# Patient Record
Sex: FEMALE | Race: WHITE | Employment: UNEMPLOYED | URBAN - METROPOLITAN AREA
[De-identification: names, ages, dates, MRNs, and addresses within clinical notes are randomized per-mention and may not be internally consistent; named-entity substitution may affect disease eponyms.]

---

## 2022-08-29 ENCOUNTER — HOSPITAL ENCOUNTER (INPATIENT)
Age: 62
LOS: 16 days | Discharge: NURSING, REHAB OR LONG-TERM CARE | DRG: 280 | End: 2022-09-15
Attending: EMERGENCY MEDICINE | Admitting: INTERNAL MEDICINE
Payer: MEDICAID

## 2022-08-29 DIAGNOSIS — K76.6 PORTAL HYPERTENSIVE GASTROPATHY (HCC): ICD-10-CM

## 2022-08-29 DIAGNOSIS — K70.11 ASCITES DUE TO ALCOHOLIC HEPATITIS: ICD-10-CM

## 2022-08-29 DIAGNOSIS — E43 SEVERE PROTEIN-CALORIE MALNUTRITION (HCC): ICD-10-CM

## 2022-08-29 DIAGNOSIS — R17 JAUNDICE: ICD-10-CM

## 2022-08-29 DIAGNOSIS — N17.9 AKI (ACUTE KIDNEY INJURY) (HCC): ICD-10-CM

## 2022-08-29 DIAGNOSIS — E72.20 HYPERAMMONEMIA (HCC): ICD-10-CM

## 2022-08-29 DIAGNOSIS — K31.89 PORTAL HYPERTENSIVE GASTROPATHY (HCC): ICD-10-CM

## 2022-08-29 DIAGNOSIS — D68.9 COAGULOPATHY (HCC): ICD-10-CM

## 2022-08-29 DIAGNOSIS — D72.829 LEUKOCYTOSIS, UNSPECIFIED TYPE: ICD-10-CM

## 2022-08-29 DIAGNOSIS — K70.31 ALCOHOLIC CIRRHOSIS OF LIVER WITH ASCITES (HCC): Primary | ICD-10-CM

## 2022-08-29 DIAGNOSIS — K70.31 ASCITES DUE TO ALCOHOLIC CIRRHOSIS (HCC): ICD-10-CM

## 2022-08-29 DIAGNOSIS — K70.11 ALCOHOLIC HEPATITIS WITH ASCITES: ICD-10-CM

## 2022-08-29 DIAGNOSIS — M62.59 ATROPHY OF MUSCLE OF MULTIPLE SITES: ICD-10-CM

## 2022-08-29 DIAGNOSIS — D64.9 ANEMIA, UNSPECIFIED TYPE: ICD-10-CM

## 2022-08-29 PROCEDURE — 99285 EMERGENCY DEPT VISIT HI MDM: CPT

## 2022-08-30 ENCOUNTER — APPOINTMENT (OUTPATIENT)
Dept: GENERAL RADIOLOGY | Age: 62
DRG: 280 | End: 2022-08-30
Attending: INTERNAL MEDICINE
Payer: MEDICAID

## 2022-08-30 ENCOUNTER — APPOINTMENT (OUTPATIENT)
Dept: ULTRASOUND IMAGING | Age: 62
DRG: 280 | End: 2022-08-30
Attending: NURSE PRACTITIONER
Payer: MEDICAID

## 2022-08-30 ENCOUNTER — APPOINTMENT (OUTPATIENT)
Dept: CT IMAGING | Age: 62
DRG: 280 | End: 2022-08-30
Attending: INTERNAL MEDICINE
Payer: MEDICAID

## 2022-08-30 PROBLEM — K70.31 ALCOHOLIC CIRRHOSIS OF LIVER WITH ASCITES (HCC): Status: ACTIVE | Noted: 2022-08-30

## 2022-08-30 LAB
ALBUMIN SERPL-MCNC: 1.8 G/DL (ref 3.5–5)
ALBUMIN/GLOB SERPL: 0.6 {RATIO} (ref 1.1–2.2)
ALP SERPL-CCNC: 230 U/L (ref 45–117)
ALT SERPL-CCNC: 35 U/L (ref 12–78)
AMMONIA PLAS-SCNC: 11 UMOL/L
ANION GAP SERPL CALC-SCNC: 11 MMOL/L (ref 5–15)
ANION GAP SERPL CALC-SCNC: 14 MMOL/L (ref 5–15)
APPEARANCE UR: ABNORMAL
AST SERPL-CCNC: 91 U/L (ref 15–37)
BACTERIA URNS QL MICRO: NEGATIVE /HPF
BASE DEFICIT BLDV-SCNC: 6.2 MMOL/L
BASOPHILS # BLD: 0 K/UL (ref 0–0.1)
BASOPHILS NFR BLD: 0 % (ref 0–1)
BILIRUB SERPL-MCNC: 29.7 MG/DL (ref 0.2–1)
BILIRUB UR QL CFM: POSITIVE
BNP SERPL-MCNC: 1370 PG/ML
BUN SERPL-MCNC: 66 MG/DL (ref 6–20)
BUN SERPL-MCNC: 66 MG/DL (ref 6–20)
BUN/CREAT SERPL: 15 (ref 12–20)
BUN/CREAT SERPL: 16 (ref 12–20)
CALCIUM SERPL-MCNC: 7.9 MG/DL (ref 8.5–10.1)
CALCIUM SERPL-MCNC: 8.3 MG/DL (ref 8.5–10.1)
CHLORIDE SERPL-SCNC: 102 MMOL/L (ref 97–108)
CHLORIDE SERPL-SCNC: 96 MMOL/L (ref 97–108)
CO2 SERPL-SCNC: 19 MMOL/L (ref 21–32)
CO2 SERPL-SCNC: 19 MMOL/L (ref 21–32)
COLOR UR: ABNORMAL
COMMENT, HOLDF: NORMAL
COMMENT, HOLDF: NORMAL
CREAT SERPL-MCNC: 4.21 MG/DL (ref 0.55–1.02)
CREAT SERPL-MCNC: 4.35 MG/DL (ref 0.55–1.02)
CREAT UR-MCNC: 201 MG/DL
CREAT UR-MCNC: 204 MG/DL
CRP SERPL-MCNC: 10.2 MG/DL (ref 0–0.6)
DIFFERENTIAL METHOD BLD: ABNORMAL
EOSINOPHIL # BLD: 0 K/UL (ref 0–0.4)
EOSINOPHIL NFR BLD: 0 % (ref 0–7)
EPITH CASTS URNS QL MICRO: ABNORMAL /LPF
ERYTHROCYTE [DISTWIDTH] IN BLOOD BY AUTOMATED COUNT: 18.6 % (ref 11.5–14.5)
EST. AVERAGE GLUCOSE BLD GHB EST-MCNC: ABNORMAL MG/DL
FERRITIN SERPL-MCNC: 1121 NG/ML (ref 26–388)
FOLATE SERPL-MCNC: 14.7 NG/ML (ref 5–21)
GLOBULIN SER CALC-MCNC: 3.2 G/DL (ref 2–4)
GLUCOSE SERPL-MCNC: 122 MG/DL (ref 65–100)
GLUCOSE SERPL-MCNC: 95 MG/DL (ref 65–100)
GLUCOSE UR STRIP.AUTO-MCNC: NEGATIVE MG/DL
HBA1C MFR BLD: <3.8 % (ref 4–5.6)
HCO3 BLDV-SCNC: 17.9 MMOL/L (ref 23–28)
HCT VFR BLD AUTO: 27.3 % (ref 35–47)
HGB BLD-MCNC: 9.8 G/DL (ref 11.5–16)
HGB UR QL STRIP: ABNORMAL
IMM GRANULOCYTES # BLD AUTO: 0.6 K/UL (ref 0–0.04)
IMM GRANULOCYTES NFR BLD AUTO: 2 % (ref 0–0.5)
INR PPP: 1.8 (ref 0.9–1.1)
IRON SATN MFR SERPL: 32 % (ref 20–50)
IRON SERPL-MCNC: 34 UG/DL (ref 35–150)
KETONES UR QL STRIP.AUTO: NEGATIVE MG/DL
LACTATE SERPL-SCNC: 1.6 MMOL/L (ref 0.4–2)
LEUKOCYTE ESTERASE UR QL STRIP.AUTO: ABNORMAL
LIPASE SERPL-CCNC: 90 U/L (ref 73–393)
LYMPHOCYTES # BLD: 1.8 K/UL (ref 0.8–3.5)
LYMPHOCYTES NFR BLD: 6 % (ref 12–49)
MAGNESIUM SERPL-MCNC: 2.2 MG/DL (ref 1.6–2.4)
MCH RBC QN AUTO: 35.5 PG (ref 26–34)
MCHC RBC AUTO-ENTMCNC: 35.9 G/DL (ref 30–36.5)
MCV RBC AUTO: 98.9 FL (ref 80–99)
MONOCYTES # BLD: 2.7 K/UL (ref 0–1)
MONOCYTES NFR BLD: 9 % (ref 5–13)
NEUTS SEG # BLD: 24.8 K/UL (ref 1.8–8)
NEUTS SEG NFR BLD: 83 % (ref 32–75)
NITRITE UR QL STRIP.AUTO: POSITIVE
NRBC # BLD: 0 K/UL (ref 0–0.01)
NRBC BLD-RTO: 0 PER 100 WBC
PCO2 BLDV: 29.5 MMHG (ref 41–51)
PH BLDV: 7.39 [PH] (ref 7.32–7.42)
PH UR STRIP: 5.5 [PH] (ref 5–8)
PHOSPHATE SERPL-MCNC: 4.5 MG/DL (ref 2.6–4.7)
PLATELET # BLD AUTO: 330 K/UL (ref 150–400)
PMV BLD AUTO: 10.6 FL (ref 8.9–12.9)
PO2 BLDV: 30 MMHG (ref 25–40)
POTASSIUM SERPL-SCNC: 3.5 MMOL/L (ref 3.5–5.1)
POTASSIUM SERPL-SCNC: 3.8 MMOL/L (ref 3.5–5.1)
PROT SERPL-MCNC: 5 G/DL (ref 6.4–8.2)
PROT UR STRIP-MCNC: 30 MG/DL
PROT UR-MCNC: 116 MG/DL (ref 0–11.9)
PROT/CREAT UR-RTO: 0.6
PROTHROMBIN TIME: 18.4 SEC (ref 9–11.1)
RBC # BLD AUTO: 2.76 M/UL (ref 3.8–5.2)
RBC #/AREA URNS HPF: ABNORMAL /HPF (ref 0–5)
RBC MORPH BLD: ABNORMAL
RBC MORPH BLD: ABNORMAL
SAMPLES BEING HELD,HOLD: NORMAL
SAMPLES BEING HELD,HOLD: NORMAL
SAO2 % BLDV: 57.8 % (ref 65–88)
SERVICE CMNT-IMP: ABNORMAL
SODIUM SERPL-SCNC: 129 MMOL/L (ref 136–145)
SODIUM SERPL-SCNC: 132 MMOL/L (ref 136–145)
SODIUM UR-SCNC: 24 MMOL/L
SP GR UR REFRACTOMETRY: 1.02 (ref 1–1.03)
SPECIMEN TYPE: ABNORMAL
TIBC SERPL-MCNC: 107 UG/DL (ref 250–450)
TSH SERPL DL<=0.05 MIU/L-ACNC: 0.77 UIU/ML (ref 0.36–3.74)
UR CULT HOLD, URHOLD: NORMAL
URATE SERPL-MCNC: 8.3 MG/DL (ref 2.6–6)
UROBILINOGEN UR QL STRIP.AUTO: 1 EU/DL (ref 0.2–1)
VIT B12 SERPL-MCNC: >2000 PG/ML (ref 193–986)
WBC # BLD AUTO: 29.9 K/UL (ref 3.6–11)
WBC URNS QL MICRO: ABNORMAL /HPF (ref 0–4)

## 2022-08-30 PROCEDURE — 74176 CT ABD & PELVIS W/O CONTRAST: CPT

## 2022-08-30 PROCEDURE — 85610 PROTHROMBIN TIME: CPT

## 2022-08-30 PROCEDURE — P9047 ALBUMIN (HUMAN), 25%, 50ML: HCPCS | Performed by: FAMILY MEDICINE

## 2022-08-30 PROCEDURE — 36415 COLL VENOUS BLD VENIPUNCTURE: CPT

## 2022-08-30 PROCEDURE — 74011000250 HC RX REV CODE- 250: Performed by: INTERNAL MEDICINE

## 2022-08-30 PROCEDURE — 82728 ASSAY OF FERRITIN: CPT

## 2022-08-30 PROCEDURE — 83690 ASSAY OF LIPASE: CPT

## 2022-08-30 PROCEDURE — 71250 CT THORAX DX C-: CPT

## 2022-08-30 PROCEDURE — 74011250637 HC RX REV CODE- 250/637: Performed by: NURSE PRACTITIONER

## 2022-08-30 PROCEDURE — 84550 ASSAY OF BLOOD/URIC ACID: CPT

## 2022-08-30 PROCEDURE — 84443 ASSAY THYROID STIM HORMONE: CPT

## 2022-08-30 PROCEDURE — 80053 COMPREHEN METABOLIC PANEL: CPT

## 2022-08-30 PROCEDURE — 85025 COMPLETE CBC W/AUTO DIFF WBC: CPT

## 2022-08-30 PROCEDURE — 74011250636 HC RX REV CODE- 250/636: Performed by: INTERNAL MEDICINE

## 2022-08-30 PROCEDURE — 83735 ASSAY OF MAGNESIUM: CPT

## 2022-08-30 PROCEDURE — 86140 C-REACTIVE PROTEIN: CPT

## 2022-08-30 PROCEDURE — 82140 ASSAY OF AMMONIA: CPT

## 2022-08-30 PROCEDURE — 65660000001 HC RM ICU INTERMED STEPDOWN

## 2022-08-30 PROCEDURE — 81001 URINALYSIS AUTO W/SCOPE: CPT

## 2022-08-30 PROCEDURE — 84156 ASSAY OF PROTEIN URINE: CPT

## 2022-08-30 PROCEDURE — 82607 VITAMIN B-12: CPT

## 2022-08-30 PROCEDURE — 51702 INSERT TEMP BLADDER CATH: CPT

## 2022-08-30 PROCEDURE — 83550 IRON BINDING TEST: CPT

## 2022-08-30 PROCEDURE — 99223 1ST HOSP IP/OBS HIGH 75: CPT | Performed by: INTERNAL MEDICINE

## 2022-08-30 PROCEDURE — 93005 ELECTROCARDIOGRAM TRACING: CPT

## 2022-08-30 PROCEDURE — 83880 ASSAY OF NATRIURETIC PEPTIDE: CPT

## 2022-08-30 PROCEDURE — 74011250637 HC RX REV CODE- 250/637: Performed by: INTERNAL MEDICINE

## 2022-08-30 PROCEDURE — 84100 ASSAY OF PHOSPHORUS: CPT

## 2022-08-30 PROCEDURE — 83605 ASSAY OF LACTIC ACID: CPT

## 2022-08-30 PROCEDURE — 84300 ASSAY OF URINE SODIUM: CPT

## 2022-08-30 PROCEDURE — 71045 X-RAY EXAM CHEST 1 VIEW: CPT

## 2022-08-30 PROCEDURE — 82570 ASSAY OF URINE CREATININE: CPT

## 2022-08-30 PROCEDURE — 82746 ASSAY OF FOLIC ACID SERUM: CPT

## 2022-08-30 PROCEDURE — 82803 BLOOD GASES ANY COMBINATION: CPT

## 2022-08-30 PROCEDURE — 74011250636 HC RX REV CODE- 250/636: Performed by: FAMILY MEDICINE

## 2022-08-30 PROCEDURE — C9113 INJ PANTOPRAZOLE SODIUM, VIA: HCPCS | Performed by: INTERNAL MEDICINE

## 2022-08-30 PROCEDURE — 94761 N-INVAS EAR/PLS OXIMETRY MLT: CPT

## 2022-08-30 PROCEDURE — 83036 HEMOGLOBIN GLYCOSYLATED A1C: CPT

## 2022-08-30 RX ORDER — LIDOCAINE HYDROCHLORIDE 10 MG/ML
80 INJECTION, SOLUTION EPIDURAL; INFILTRATION; INTRACAUDAL; PERINEURAL
Status: DISPENSED | OUTPATIENT
Start: 2022-08-30 | End: 2022-08-31

## 2022-08-30 RX ORDER — ONDANSETRON 2 MG/ML
4 INJECTION INTRAMUSCULAR; INTRAVENOUS
Status: DISCONTINUED | OUTPATIENT
Start: 2022-08-30 | End: 2022-09-15 | Stop reason: HOSPADM

## 2022-08-30 RX ORDER — SODIUM CHLORIDE 9 MG/ML
50 INJECTION, SOLUTION INTRAVENOUS CONTINUOUS
Status: DISCONTINUED | OUTPATIENT
Start: 2022-08-30 | End: 2022-09-01

## 2022-08-30 RX ORDER — GABAPENTIN 600 MG/1
600 TABLET ORAL 3 TIMES DAILY
COMMUNITY
End: 2022-09-15

## 2022-08-30 RX ORDER — MIDODRINE HYDROCHLORIDE 5 MG/1
5 TABLET ORAL
Status: DISCONTINUED | OUTPATIENT
Start: 2022-08-30 | End: 2022-08-30

## 2022-08-30 RX ORDER — SODIUM CHLORIDE 0.9 % (FLUSH) 0.9 %
5-40 SYRINGE (ML) INJECTION AS NEEDED
Status: DISCONTINUED | OUTPATIENT
Start: 2022-08-30 | End: 2022-09-15 | Stop reason: HOSPADM

## 2022-08-30 RX ORDER — CARVEDILOL 25 MG/1
TABLET ORAL
COMMUNITY
End: 2022-09-15

## 2022-08-30 RX ORDER — MIDODRINE HYDROCHLORIDE 5 MG/1
5 TABLET ORAL
Status: COMPLETED | OUTPATIENT
Start: 2022-08-30 | End: 2022-08-30

## 2022-08-30 RX ORDER — CYCLOBENZAPRINE HCL 10 MG
TABLET ORAL
COMMUNITY
End: 2022-09-15

## 2022-08-30 RX ORDER — PREDNISONE 50 MG/1
TABLET ORAL
COMMUNITY
End: 2022-09-15

## 2022-08-30 RX ORDER — MIDODRINE HYDROCHLORIDE 5 MG/1
5 TABLET ORAL EVERY 8 HOURS
Status: DISCONTINUED | OUTPATIENT
Start: 2022-08-31 | End: 2022-08-31

## 2022-08-30 RX ORDER — BUPROPION HYDROCHLORIDE 150 MG/1
150 TABLET, EXTENDED RELEASE ORAL DAILY
Status: DISCONTINUED | OUTPATIENT
Start: 2022-08-31 | End: 2022-09-15 | Stop reason: HOSPADM

## 2022-08-30 RX ORDER — ZOLPIDEM TARTRATE 10 MG/1
TABLET ORAL
COMMUNITY
End: 2022-09-15

## 2022-08-30 RX ORDER — ALBUMIN HUMAN 250 G/1000ML
25 SOLUTION INTRAVENOUS EVERY 6 HOURS
Status: COMPLETED | OUTPATIENT
Start: 2022-08-30 | End: 2022-08-31

## 2022-08-30 RX ORDER — ACYCLOVIR 200 MG/1
CAPSULE ORAL
COMMUNITY
End: 2022-09-15

## 2022-08-30 RX ORDER — SODIUM BICARBONATE 42 MG/ML
1 INJECTION, SOLUTION INTRAVENOUS
Status: DISPENSED | OUTPATIENT
Start: 2022-08-30 | End: 2022-08-31

## 2022-08-30 RX ORDER — ARIPIPRAZOLE 2 MG/1
TABLET ORAL
COMMUNITY

## 2022-08-30 RX ORDER — DULOXETIN HYDROCHLORIDE 60 MG/1
CAPSULE, DELAYED RELEASE ORAL
COMMUNITY
End: 2022-09-15

## 2022-08-30 RX ORDER — BUPROPION HYDROCHLORIDE 150 MG/1
TABLET ORAL
COMMUNITY

## 2022-08-30 RX ORDER — SODIUM CHLORIDE 0.9 % (FLUSH) 0.9 %
5-40 SYRINGE (ML) INJECTION EVERY 8 HOURS
Status: DISCONTINUED | OUTPATIENT
Start: 2022-08-30 | End: 2022-09-15 | Stop reason: HOSPADM

## 2022-08-30 RX ORDER — HYDROCODONE BITARTRATE AND ACETAMINOPHEN 5; 325 MG/1; MG/1
TABLET ORAL
COMMUNITY
End: 2022-09-15

## 2022-08-30 RX ORDER — ACETAMINOPHEN 650 MG/1
650 SUPPOSITORY RECTAL
Status: DISCONTINUED | OUTPATIENT
Start: 2022-08-30 | End: 2022-09-15 | Stop reason: HOSPADM

## 2022-08-30 RX ORDER — OXYCODONE HYDROCHLORIDE 5 MG/1
TABLET ORAL
COMMUNITY
End: 2022-09-15

## 2022-08-30 RX ORDER — PROMETHAZINE HYDROCHLORIDE 25 MG/1
TABLET ORAL
COMMUNITY
End: 2022-09-15

## 2022-08-30 RX ORDER — ACETAMINOPHEN 325 MG/1
650 TABLET ORAL
Status: DISCONTINUED | OUTPATIENT
Start: 2022-08-30 | End: 2022-09-15 | Stop reason: HOSPADM

## 2022-08-30 RX ORDER — IBUPROFEN 200 MG
1 TABLET ORAL DAILY
Status: DISCONTINUED | OUTPATIENT
Start: 2022-08-30 | End: 2022-09-15 | Stop reason: HOSPADM

## 2022-08-30 RX ORDER — ARIPIPRAZOLE 2 MG/1
2 TABLET ORAL DAILY
Status: DISCONTINUED | OUTPATIENT
Start: 2022-08-30 | End: 2022-09-15 | Stop reason: HOSPADM

## 2022-08-30 RX ORDER — HEPARIN SODIUM 5000 [USP'U]/ML
5000 INJECTION, SOLUTION INTRAVENOUS; SUBCUTANEOUS EVERY 8 HOURS
Status: DISCONTINUED | OUTPATIENT
Start: 2022-08-30 | End: 2022-09-15 | Stop reason: HOSPADM

## 2022-08-30 RX ORDER — LORAZEPAM 1 MG/1
TABLET ORAL
COMMUNITY
End: 2022-09-15

## 2022-08-30 RX ORDER — OXYCODONE HYDROCHLORIDE 5 MG/1
5 TABLET ORAL
Status: DISCONTINUED | OUTPATIENT
Start: 2022-08-30 | End: 2022-09-15 | Stop reason: HOSPADM

## 2022-08-30 RX ORDER — LORAZEPAM 1 MG/1
1 TABLET ORAL
Status: DISCONTINUED | OUTPATIENT
Start: 2022-08-30 | End: 2022-09-15 | Stop reason: HOSPADM

## 2022-08-30 RX ORDER — OMEPRAZOLE 20 MG/1
CAPSULE, DELAYED RELEASE ORAL
COMMUNITY
Start: 2022-08-09 | End: 2022-09-15

## 2022-08-30 RX ORDER — ONDANSETRON 4 MG/1
4 TABLET, ORALLY DISINTEGRATING ORAL
Status: DISCONTINUED | OUTPATIENT
Start: 2022-08-30 | End: 2022-09-15 | Stop reason: HOSPADM

## 2022-08-30 RX ORDER — POLYETHYLENE GLYCOL 3350 17 G/17G
17 POWDER, FOR SOLUTION ORAL DAILY PRN
Status: DISCONTINUED | OUTPATIENT
Start: 2022-08-30 | End: 2022-09-15 | Stop reason: HOSPADM

## 2022-08-30 RX ORDER — METHYLPREDNISOLONE ACETATE 80 MG/ML
80 INJECTION, SUSPENSION INTRA-ARTICULAR; INTRALESIONAL; INTRAMUSCULAR; SOFT TISSUE
COMMUNITY
End: 2022-09-15

## 2022-08-30 RX ADMIN — POTASSIUM BICARBONATE 20 MEQ: 782 TABLET, EFFERVESCENT ORAL at 14:39

## 2022-08-30 RX ADMIN — SODIUM CHLORIDE, PRESERVATIVE FREE 2 G: 5 INJECTION INTRAVENOUS at 04:28

## 2022-08-30 RX ADMIN — MIDODRINE HYDROCHLORIDE 5 MG: 5 TABLET ORAL at 19:43

## 2022-08-30 RX ADMIN — MIDODRINE HYDROCHLORIDE 5 MG: 5 TABLET ORAL at 21:55

## 2022-08-30 RX ADMIN — SODIUM CHLORIDE 100 ML/HR: 900 INJECTION, SOLUTION INTRAVENOUS at 14:39

## 2022-08-30 RX ADMIN — ALBUMIN (HUMAN) 25 G: 0.25 INJECTION, SOLUTION INTRAVENOUS at 11:56

## 2022-08-30 RX ADMIN — MIDODRINE HYDROCHLORIDE 5 MG: 5 TABLET ORAL at 14:29

## 2022-08-30 RX ADMIN — SODIUM CHLORIDE 500 ML: 900 INJECTION, SOLUTION INTRAVENOUS at 14:39

## 2022-08-30 RX ADMIN — Medication 1 CAPSULE: at 13:38

## 2022-08-30 RX ADMIN — ALBUMIN (HUMAN) 25 G: 0.25 INJECTION, SOLUTION INTRAVENOUS at 18:00

## 2022-08-30 RX ADMIN — ALBUMIN (HUMAN) 25 G: 0.25 INJECTION, SOLUTION INTRAVENOUS at 23:40

## 2022-08-30 RX ADMIN — HEPARIN SODIUM 5000 UNITS: 5000 INJECTION INTRAVENOUS; SUBCUTANEOUS at 23:41

## 2022-08-30 RX ADMIN — SODIUM CHLORIDE 100 ML/HR: 900 INJECTION, SOLUTION INTRAVENOUS at 04:29

## 2022-08-30 RX ADMIN — SODIUM CHLORIDE, PRESERVATIVE FREE 10 ML: 5 INJECTION INTRAVENOUS at 21:55

## 2022-08-30 RX ADMIN — OXYCODONE HYDROCHLORIDE 5 MG: 5 TABLET ORAL at 04:29

## 2022-08-30 RX ADMIN — HEPARIN SODIUM 5000 UNITS: 5000 INJECTION INTRAVENOUS; SUBCUTANEOUS at 13:37

## 2022-08-30 RX ADMIN — PANTOPRAZOLE SODIUM 40 MG: 40 INJECTION, POWDER, FOR SOLUTION INTRAVENOUS at 13:37

## 2022-08-30 NOTE — H&P
[address] [facility]  HISTORY AND PHYSICAL    Name:  Jackson Greene  MR#:  [id number]  :  1960  ACCOUNT #:  [de-identified]  ADMIT DATE:  2022      The patient was seen, evaluated, and admitted by me on 2022. PRIMARY CARE PHYSICIAN:  Jalil Jimenez MD    SOURCE OF INFORMATION:  The patient and review of electronic medical record. CHIEF COMPLAINT:  Abdominal pain. HISTORY OF PRESENT ILLNESS:  This is a 43-year-old woman with a past medical history significant for hypertension, alcoholic liver cirrhosis, who presented at the emergency room with abdominal pain. The patient presented at [facility] Emergency [facility]. When the patient arrived at the emergency room, the patient was found to have decompensation of liver cirrhosis. The hospitalist service at [facility] was asked to directly admit the patient to [facility], so that the patient can be seen by the hepatologist.  The patient came to the emergency room at [facility], because of lack of bed on the on the medical floor. When the patient arrived at the emergency room, the patient's lab work shows significant abnormality. She was subsequently referred to the hospitalist service for admission. No record of prior admission to this hospital.  It was reported that the patient underwent abdominal paracentesis before coming to [facility].  The amount of fluid removed is not known. The patient's abdominal pain is diffuse in location, dull ache, constant, 7/10 in severity with no known aggravating or relieving factors associated with nausea, but no vomiting, no diarrhea, and no constipation. PAST MEDICAL HISTORY:  Alcoholic liver cirrhosis, hypertension. ALLERGIES:  THE PATIENT IS ALLERGIC TO LISINOPRIL AND PRILOSEC. MEDICATIONS:  1. Ambien 10 mg daily at bedtime. 2.  Phenergan, dosage as directed. 3.  Prednisone 50 mg daily. 4.  Oxycodone, dosage as directed for pain.   5.  Ativan 1 mg twice daily as needed. 6.  Coreg 25 mg twice daily. 7.  Abilify 2 mg daily. 8.  Acyclovir 200 mg daily. FAMILY HISTORY:  This was reviewed. Her mother had hypertension. PAST SURGICAL HISTORY:  This is significant for right hip replacement. SOCIAL HISTORY:  The patient smokes less than a pack of cigarettes daily, admits to generous consumption of alcohol. REVIEW OF SYSTEMS:  HEAD, EYES, EARS, NOSE, AND THROAT:  No headache, no dizziness, no blurring of vision, no photophobia. RESPIRATORY SYSTEM:  No cough, no shortness of breath, no hemoptysis. CARDIOVASCULAR SYSTEM:  No chest pain, no orthopnea, no palpitation. GASTROINTESTINAL SYSTEM:  This is positive for abdominal pain, nausea. No vomiting. No diarrhea. No constipation. GENITOURINARY SYSTEM:  No dysuria, no urgency, and no frequency. All other systems are reviewed and they are negative. PHYSICAL EXAMINATION:  GENERAL APPEARANCE:  The patient appeared ill, in moderate distress. VITAL SIGNS:  On arrival at the emergency room, temperature 98.2, pulse 80, respiratory rate 16, blood pressure 95/54, oxygen saturation 95%. HEENT:  Head:  Normocephalic, atraumatic. Eyes:  Normal eye movement. No redness, no drainage, no discharge. Markedly icteric. Ears:  Normal external ears with no evidence of drainage. Nose:  No deformity and no drainage. Mouth and Throat:  No visible oral lesion. Dry oral mucosa. NECK:  Neck is supple. No JVD, no thyromegaly. CHEST:  Clear breath sounds. No wheezing, no crackles. HEART:  Normal S1 and S2, regular. No clinically appreciable murmur. ABDOMEN:  Diffuse tenderness, distended. Positive fluid thrill. Normal bowel sounds. CNS:  Alert, oriented x3. No gross focal neurological deficit. EXTREMITIES:  Edema 2+. Pulses 2+ bilaterally. MUSCULOSKELETAL SYSTEM:  No evidence of joint deformity or swelling. SKIN:  The patient is  paper yellow with ecchymosis on the face.   PSYCHIATRY:  Normal mood and affect. LYMPHATIC SYSTEM:  No cervical lymphadenopathy. DIAGNOSTIC DATA:  Chest x-ray shows bibasilar atelectasis and low lung volumes. LABORATORY DATA:  Chemistry:  Sodium 129, potassium 3.8, chloride 96, CO2 19, glucose 122, BUN 66, creatinine 4.35, calcium 8.3, total bilirubin 29.7, ALT 35, AST 91, alkaline phosphatase 230, total protein at 5.0, albumin level 1.8, globulin at 3.2. Hematology:  WBC 29.9, hemoglobin at 9.8, hematocrit 27.3, platelets 304. Ammonia level 11. Coagulation Profile:  INR 1.8, PT 18.4. ASSESSMENT:  1. Alcoholic liver cirrhosis with ascites. 2.  Spontaneous bacterial peritonitis. 3.  Coagulopathy. 4.  Anemia. 5.  Hyperglycemia. 6.  Hyponatremia. 7.  Leukocytosis. 8.  Acute kidney injury. 9.  Hypertension. 10.  Tobacco abuse. PLAN:  1. Alcoholic liver cirrhosis with ascites. We will admit the patient for further evaluation and treatment. It was reported that the patient underwent paracentesis shortly before coming to this hospital.  The amount of fluid removed was not known. Hepatology consult will be requested to assist in further evaluation and treatment. We will consult Interventional Radiology for ultrasound-guided abdominal paracentesis with the ascitic fluid sent for various studies. We will await further recommendation from the hepatologist.  2.  Spontaneous bacterial peritonitis. The patient has liver cirrhosis, presented with abdominal pain, markedly elevated white blood cell count, raising the suspicion for spontaneous bacterial peritonitis. We will start the patient on Rocephin. We will obtain blood culture. We will await the results of ascitic fluid studies. We will also await further recommendation from the Hepatologist.  3.  Coagulopathy. This is secondary to the patient's liver disease. We will continue to monitor. No active bleeding. 4.  Anemia. The patient's hemoglobin has improved from 4.4 to 9.8.   We will carry out anemia workup to evaluate the patient for other possible causes of anemia including checking a stool guaiac to rule out occult GI bleed. 5.  Hyperglycemia. We will check A1c level. The patient denies history of diabetes. The hyperglycemia could be due to steroid therapy. The patient was receiving steroids prior to admission to this hospital.  6.  Hyponatremia. This most likely due to the liver cirrhosis. We will monitor the patient's sodium level closely. 7.  Leukocytosis. The patient presented with significant leukocytosis, part of this could be due to steroid therapy and part of it could also be due to spontaneous bacterial peritonitis. We will obtain CT scan of the chest and we will also obtain a CT scan of the abdomen to evaluate the patient for other possible sources of infection such as pneumonia and colitis. We will check lactic acid level. 8.  Acute kidney injury. This is most likely due to volume depletion. We will await the results of CT scan of the abdomen and pelvis to evaluate the patient for obstructive lesion. Nephrology consult will be requested to assist in further evaluation and treatment. We will carry out hydration and monitor the patient's renal function while awaiting further recommendation from the nephrologist.  9.  Hypertension. The patient's blood pressure is borderline in the emergency room. We will hold antihypertensive medication and carry out fluid therapy. 10.  Tobacco abuse. The patient advised to quit smoking. We will place the patient on Nicoderm patch. 11.  Other Issues:  Code Status: The patient is a full code. We will place the patient on heparin for DVT prophylaxis. FUNCTIONAL STATUS PRIOR TO ADMISSION:  The patient came from home. The patient is ambulatory with no assistive device. COVID PRECAUTION:  The patient was wearing a face mask. I was wearing a face mask and gloves for this patient's encounter.       Mark Chester, MD [id number]  D:  08/30/2022 5:21  T:  08/30/2022 7:19  JOB #:  [id number]  CC:   Mario Alberto Chirinos MD

## 2022-08-30 NOTE — PROGRESS NOTES
[address] [facility]  Hospitalist Group                                                                                          Hospitalist Progress Note  Behzad Boone NP  Answering service: [phone] from in house phone        Date of Service:  2022  NAME:  Dorice Boas  :  1960  MRN:  [id number]      Admission Summary:   Per H&P, This is a 59-year-old woman with a past medical history significant for hypertension, alcoholic liver cirrhosis, who presented at the emergency room with abdominal pain. The patient presented at [facility] Emergency Room. When the patient arrived at the emergency room, the patient was found to have decompensation of liver cirrhosis. The hospitalist service at [facility] was asked to directly admit the patient to [facility], so that the patient can be seen by the hepatologist.  The patient came to the emergency room at [facility], because of lack of bed on the on the medical floor. When the patient arrived at the emergency room, the patient's lab work shows significant abnormality. She was subsequently referred to the hospitalist service for admission. No record of prior admission to this hospital.  It was reported that the patient underwent abdominal paracentesis before coming to [facility].  The amount of fluid removed is not known. The patient's abdominal pain is diffuse in location, dull ache, constant, 7/10 in severity with no known aggravating or relieving factors associated with nausea, but no vomiting, no diarrhea, and no constipation. Interval history / Subjective:   I saw the patient this morning on rounds. Patient drowsy however arousable.   Did endorse that she had not followed up with hepatology     Assessment & Plan:        Decompensated cirrhosis of the liver  Transferred from outside hospital, did have complaints of abdominal pain previously however not on my exam  Does have leukocytosis 29 with elevated CRP  Paracentesis, diagnostic labs have been ordered. Patient has no rebound however will need to rule out SBP  She did have EGD on 8/8, noted grade 1 varices  Meld score 39  Hepatology consult    Acute kidney injury  According to outside records, had normal creatinine earlier in the month  Nephrology following, appreciate your help  Creatinine 4.21  Likely intravascularly dry versus hepatorenal versus ATN versus other  Avoiding nephrotoxins    Hypotension  Blood pressure low, maps remain in the 60s range however  Continuing albumin  Nephrology has added midodrine    Hypertension  Blood pressure soft, holding antihypertensives    Code status: FULL  Prophylaxis: SCD  Care Plan discussed with: Patient, nephrology, nurse  Anticipated Disposition: Likely discharge in several days     Hospital Problems  Date Reviewed: 8/30/2022            Codes Class Noted POA    * (Principal) Alcoholic cirrhosis of liver with ascites ([facility] [address] ICD-10-CM: K70.31  ICD-9-CM: 571.2  8/30/2022 Yes             Review of Systems:   A comprehensive review of systems was negative except for that written in the HPI. Vital Signs:    Last 24hrs VS reviewed since prior progress note. Most recent are:  Visit Vitals  BP (!) 99/53   Pulse 77   Temp (!) 96.4 °F (35.8 °C)   Resp 13   SpO2 99%       No intake or output data in the 24 hours ending 08/30/22 1612     Physical Examination:     I had a face to face encounter with this patient and independently examined them on 8/30/2022 as outlined below:          Constitutional:  No acute distress, cooperative, pleasant    ENT:  Oral mucosa moist, oropharynx benign. Resp:  CTA bilaterally. No wheezing/rhonchi/rales. No accessory muscle use. CV:  Regular rhythm, normal rate,     GI: Soft, distended    Musculoskeletal:    No edema, warm, 2+ pulses throughout jaundiced    Neurologic:  Moves all extremities.   Ox2, no asterixis            Data Review:    Review and/or order of clinical lab test  Review and/or order of tests in the radiology section of [facility]      Labs:     Recent Labs     08/30/22 0152   WBC 29.9*   HGB 9.8*   HCT 27.3*        Recent Labs     08/30/22  1118 08/30/22 0538 08/30/22 0152   *  --  129*   K 3.5  --  3.8     --  96*   CO2 19*  --  19*   BUN 66*  --  66*   CREA 4.21*  --  4.35*   GLU 95  --  122*   CA 7.9*  --  8.3*   MG  --  2.2  --    PHOS  --  4.5  --    URICA 8.3*  --   --      Recent Labs     08/30/22 0538 08/30/22 0152   ALT  --  35   AP  --  230*   TBILI  --  29.7*   TP  --  5.0*   ALB  --  1.8*   GLOB  --  3.2   LPSE 90  --      Recent Labs     08/30/22 0152   INR 1.8*   PTP 18.4*      Recent Labs     08/30/22 0538   TIBC 107*   PSAT 32   FERR 1,121*      Lab Results   Component Value Date/Time    Folate 14.7 08/30/2022 01:52 AM      No results for input(s): PH, PCO2, PO2 in the last 72 hours. No results for input(s): CPK, CKNDX, TROIQ in the last 72 hours.     No lab exists for component: CPKMB  No results found for: CHOL, CHOLX, CHLST, CHOLV, HDL, HDLP, LDL, LDLC, DLDLP, TGLX, TRIGL, TRIGP, CHHD, CHHDX  No results found for: GLUCPOC  Lab Results   Component Value Date/Time    Color DARK YELLOW 08/30/2022 12:08 PM    Appearance TURBID (A) 08/30/2022 12:08 PM    Specific gravity 1.018 08/30/2022 12:08 PM    pH (UA) 5.5 08/30/2022 12:08 PM    Protein 30 (A) 08/30/2022 12:08 PM    Glucose Negative 08/30/2022 12:08 PM    Ketone Negative 08/30/2022 12:08 PM    Urobilinogen 1.0 08/30/2022 12:08 PM    Nitrites Positive (A) 08/30/2022 12:08 PM    Leukocyte Esterase MODERATE (A) 08/30/2022 12:08 PM    Epithelial cells FEW 08/30/2022 12:08 PM    Bacteria Negative 08/30/2022 12:08 PM    WBC 0-4 08/30/2022 12:08 PM    RBC 20-50 08/30/2022 12:08 PM         Medications Reviewed:     Current Facility-Administered Medications   Medication Dose Route Frequency    oxyCODONE IR (ROXICODONE) tablet 5 mg  5 mg Oral Q4H PRN    cefTRIAXone (ROCEPHIN) 2 g in 0.9% sodium chloride 20 mL IV syringe  2 g IntraVENous Q24H    0.9% sodium chloride infusion  100 mL/hr IntraVENous CONTINUOUS    ARIPiprazole (ABILIFY) tablet 2 mg  2 mg Oral DAILY    . PHARMACY TO SUBSTITUTE PER PROTOCOL (Reordered from: buPROPion XL (WELLBUTRIN XL) 150 mg tablet)    Per Protocol    LORazepam (ATIVAN) tablet 1 mg  1 mg Oral BID PRN    pantoprazole (PROTONIX) 40 mg in 0.9% sodium chloride 10 mL injection  40 mg IntraVENous ACB    sodium chloride (NS) flush 5-40 mL  5-40 mL IntraVENous Q8H    sodium chloride (NS) flush 5-40 mL  5-40 mL IntraVENous PRN    acetaminophen (TYLENOL) tablet 650 mg  650 mg Oral Q6H PRN    Or    acetaminophen (TYLENOL) suppository 650 mg  650 mg Rectal Q6H PRN    polyethylene glycol (MIRALAX) packet 17 g  17 g Oral DAILY PRN    ondansetron (ZOFRAN ODT) tablet 4 mg  4 mg Oral Q8H PRN    Or    ondansetron (ZOFRAN) injection 4 mg  4 mg IntraVENous Q6H PRN    heparin (porcine) injection 5,000 Units  5,000 Units SubCUTAneous Q8H    L.acidophilus-paracasei-S.thermophil-bifidobacter (RISAQUAD) 8 billion cell capsule  1 Capsule Oral DAILY    nicotine (NICODERM CQ) 21 mg/24 hr patch 1 Patch  1 Patch TransDERmal DAILY    albumin human 25% (BUMINATE) solution 25 g  25 g IntraVENous Q6H    midodrine (PROAMATINE) tablet 5 mg  5 mg Oral TID WITH MEALS    sodium chloride 0.9 % bolus infusion 500 mL  500 mL IntraVENous ONCE    lidocaine (PF) (XYLOCAINE) 10 mg/mL (1 %) injection 8 mL  80 mg SubCUTAneous RAD ONCE    sodium bicarbonate (4.2%) injection 42 mg  1 mL SubCUTAneous RAD ONCE     Current Outpatient Medications   Medication Sig    zolpidem (AMBIEN) 10 mg tablet zolpidem 10 mg tablet   TK 1 T PO QHS PRN    promethazine (PHENERGAN) 25 mg tablet promethazine 25 mg tablet    predniSONE (DELTASONE) 50 mg tablet prednisone 50 mg tablet    oxyCODONE IR (ROXICODONE) 5 mg immediate release tablet oxycodone 5 mg tablet    omeprazole (PRILOSEC) 20 mg capsule     methylPREDNISolone acetate (DEPO-MEDROL) 80 mg/mL injection 80 mg. LORazepam (ATIVAN) 1 mg tablet lorazepam 1 mg tablet   TAKE ONE TABLET BY MOUTH BID PRN    loratadine 10 mg cap loratadine 10 mg capsule   Take 1 capsule every other day by oral route as needed. HYDROcodone-acetaminophen (NORCO) 5-325 mg per tablet hydrocodone 5 mg-acetaminophen 325 mg tablet   TK 1 T PO Q 8 H PRN    gabapentin (NEURONTIN) 600 mg tablet Take 600 mg by mouth three (3) times daily.     DULoxetine (CYMBALTA) 60 mg capsule duloxetine 60 mg capsule,delayed release    cyclobenzaprine (FLEXERIL) 10 mg tablet cyclobenzaprine 10 mg tablet    carvediloL (COREG) 25 mg tablet carvedilol 25 mg tablet    buPROPion XL (WELLBUTRIN XL) 150 mg tablet bupropion HCl  mg 24 hr tablet, extended release   TK 1 T PO QD    ARIPiprazole (ABILIFY) 2 mg tablet aripiprazole 2 mg tablet    acyclovir (ZOVIRAX) 200 mg capsule acyclovir 200 mg capsule     ______________________________________________________________________  EXPECTED LENGTH OF STAY: 4d 16h  ACTUAL LENGTH OF STAY:          0                 Jesssantiago Dang NP

## 2022-08-30 NOTE — ED NOTES
Pt bladder scanned, showing >1200; s/w Dr. Hoang Johnson, received order for paredes catheter. Difficult paredes insertion attempted with PASHA Rivera; scant amount of urine out. Pt with distended abdomen; MD notified, no new orders received.

## 2022-08-30 NOTE — CONSULTS
[facility], MD, Carlos, Ayesha Mongi S[place], [place]      EDITH Ding, [facility]-BC     Argentina Sparks, [facility]-BC   Rip Councilman, FNP-C Jacquie Imperial, [facility]       Eleonora Tubbs [address]    at [address], [facility] [place]  22.    [phone]    FAX: [address] [place] [address]    at [address], [address]    [phone]    FAX: [phone]       HEPATOLOGY CONSULT NOTE  I was called about this patient from [facility] and agreed to transfer to [address] [place] [address] for treatment of severe alcoholic hepatitis. I have reviewed the Emergency room notes from [facility] and [address] [place] [address], [facility] admission note, Notes by all other physicians who have seen the patient during this hospitalization to date. I have reviewed the problem list, all past medical history and the reason for this hospitalization. I have reviewed the allergies and the medications the patient was taking at home prior to this hospitalization. HISTORY:  The patient is a 65 y.o.  female with a multi-decade history of consuming 1 bottle of wine per night. She was told by per medical provider in the past to cut back on alcohol use. She developed jaundice and scleral icterus 12 days ago. She has developed weakness and is unable to walk. She lives alone and has no family in the area. This is her first ever hospitalization for alcohol related illness. In the ED Laboratory studies were significant for:    WBC 29.9, HB 9.8 gms, , Sna 129, Scr 4.3 mg, AST 91, ALT 35, , TBILI 29.7 mg, INR 1.8, Sammonia 11,     Imaging of the liver with CT scan demonstrated cirrhosis with a large volume of ascites.     Hepatology Plan:  IV albumin at usual dosing  Serology for other causes of chronic liver disease  No steroids because of infection risk. She is too muscle wasted and high WBC  Talk her into placing feeding tube for nutrition. She will not survive without adequate nutrition  IV banana bag  Extra IV vitamin K    ASSESSMENT AND PLAN:  Cirrhosis  The diagnosis of cirrhosis is based upon imaging, laboratory studies, complications of cirrhosis. Cirrhosis is secondary to alcohol. The CTP is 10. Child class C. The MELD score is 39. Based upon the MELD and CTP scores the patient has a mortality of about 50-70% within the next 90 days     Alcohol liver disease  Suspect the patient has alcohol induced liver disease based upon a history of consuming significant alcohol on a daily basis for many years, pattern of AST>ALT,   Serologies for other causes of chronic liver disease need to be obtained at some point    Ascites   Ascites developed for the first time in 8/2022. Will need large volume paracentesis   IV albumin at usual dose    Screening for Esophageal varices   The patient has not had an EGD to screen for varices. Will perform EGD at some point during the hospitalization. Not bleeding. Top top priority    Hepatic encephalopathy   Overt HE has not developed to date. There is no reason for treatment with lactulose of xifaxan  There is no rason to give her diarrhea. There is no need to restrict dietary protein at this time. ch will place the patient and others at risk for injury. Coagulopathy  This is secondary to cirrhosis, malnutrition,   Will treat with 3 doses of Vitamin K over 3 days. There is no evidence of bleeding. No need for FFP at this time.     Malnutrition/muscle wasting  The patient has severe protein-calorie malnutrition with severe muscle wasting of the upper extremities, lower extremities, facial muscles  Malnutrition and muscle wasting is due to Alcohol abuse and poor caloric intake, cirrhosis and poor caloric utilization, refractory ascites and inability to eat sufficient calories,     The patient needs tube feedings. It is impossible for her to get any significant amount of calories by eating   Consult nutrition service for formula and to guide volume after place feeding tube. SYSTEM REVIEW:  Constitution systems: Weakness. Eyes: Negative for visual changes. ENT: Negative for sore throat, painful swallowing. Respiratory: Negative for cough, hemoptysis, SOB. Cardiology: Negative for chest pain, palpitations. GI:  marked abdominal distention. : Negative for urinary frequency, dysuria, hematuria, nocturia. Skin: Negative for rash. Hematology: Negative for easy bruising, blood clots. Musculo-skelatal: Severe mulscle wasting. Neurologic: Negative for headaches, dizziness, vertigo, memory problems not related to HE. Psychology: Negative for anxiety, depression. FAMILY HISTORY:  The father  of alcohol abuse disorder and lung cancer. The mother is alive at age 80 years  There is no family history of liver disease. SOCIAL HISTORY:  The patient has never been . The patient has no children. The patient has never used tobacco products. The patient consumes 1 bottle of wine per day x 20 years. She has not had ETOH in 12 days  The patient used to work as a . The patient retired in . Her only family is a sister who lives in [address] [place], [place] [address]vard:  VS: per nursing note  General:  Ill appearing  Eyes:  Sclera deeply icteric  ENT:  No oral lesions. Thyroid normal.  Nodes:  No adenopathy. Skin:  Numerous spider angiomata. Jaundice. Respiratory:  Lungs clear to auscultation. Cardiovascular:  Regular heart rate. Abdomen:  Distended with obvious ascites. Extremities:  No lower extremity edema. Severe muscle wasting. Neurologic:  Alert and oriented. Cranial nerves grossly intact. No asterixis.     LABORATORY:   Latest Reference Range & Units 22 01:52   WBC 3.6 - 11.0 K/uL 29.9 (H)   HGB 11.5 - 16.0 g/dL 9.8 (L)   PLATELET 558 - 115 K/uL 330   INR 0.9 - 1.1   1.8 (H)   Sodium 136 - 145 mmol/L 129 (L)   Potassium 3.5 - 5.1 mmol/L 3.8   Chloride 97 - 108 mmol/L 96 (L)   CO2 21 - 32 mmol/L 19 (L)   Glucose 65 - 100 mg/dL 122 (H)   BUN 6 - 20 MG/DL 66 (H)   Creatinine 0.55 - 1.02 MG/DL 4.35 (H)   Bilirubin, total 0.2 - 1.0 MG/DL 29.7 (H)   Albumin 3.5 - 5.0 g/dL 1.8 (L)   ALT 12 - 78 U/L 35   AST 15 - 37 U/L 91 (H)   Alk.  phosphatase 45 - 117 U/L 230 (H)   Ammonia, plasma <32 UMOL/L 11   (H): Data is abnormally high  (L): Data is abnormally low      Hu Bravo MD  [facility] [address] [facility] [place]  22.  [phone]  [address] [facility]

## 2022-08-30 NOTE — ED TRIAGE NOTES
She was transferred from [facility]. She is jaundiced with ascites. She had a paracentesis today with an unknown amount of drainage. She had previous blood work and had a low potassium treated. She is alert and oriented.  She has bruising on her face and arms and chest.

## 2022-08-30 NOTE — CONSULTS
NEPHROLOGY CONSULT NOTE     Patient: Mickey Muniz MRN: [id number]  PCP: Dayron Taylor MD   :     1960  Age:   65 y.o. Sex:  female      Referring physician: Jose C Omalley MD  Reason for consultation: 65 y.o. female with Alcoholic cirrhosis of liver with ascites ([facility].) [Q72.77] complicated by RANDY   Admission Date: 2022 11:48 PM  LOS: 0 days     DISCUSSION / PLAN :   Acute kidney injury -cr 0.4 in early August,   -RANDY/possible ATN due to intravascular volume depletion in setting of poor po intake, hypotension /third spacing and possible sepsis/NSAIDs. Hepatorenal in DDx but less likely.   -agree with IV albumin  -will add midodrine  -c/w  IVF for now, DC when Bps more stable-will give bolus Ivf x1  -check urine lytes  -strict Is and Os  -avoid nephrotoxins, IV contrast/NSAIDs  -dose meds per GFR    Hypotension-with h/o HTN on Coreg, due to effective intravascular volume depletion  -on prednisone per med list, but patient denies taking that. Although she is not fully oriented. Hyponatremia, due to liver cirrhosis and volume depletion, chronic since early August  -improving with IVF  -Avoid rapid correction    Alcoholic liver cirrhosis-decompensated. hepatology to see  -Hypoalbuminemia/ascitis/coagulopathy-nl ammonia  Possible SBP, on Rocephine  H/o HTN, hold Coreg  Anemia       Active Problems / Assessment AAActive  :   Principal Problem:    Alcoholic cirrhosis of liver with ascites ([facility] (2022)    RANDY  Hyponatremia  Hypotension     Subjective:   HPI: Mickey Muniz is a 65 y.o.  female who has been admitted to the hospital forevaluation of liver failure/cirrhosis. She has h/o HTN and alcoholic liver cirrhosis. Her cr on 22 was 0.4 and 3.3 on  at [facility], up to 4.2 today. She had her ascitis tapped yesterday but unclear amount showed 50 WBC with 75% macrophages. She has h/o HTN but currently she is hypotensive with leukocytosis.  Her Na was 129 on admission and apparently has been low in since early August. UA showed blood, RBCs and protein. She denies taking NSAIDs but Naproxen is listed in her med list at [facility]. She denies change in urine. Denies h/o kidney disease, renal stone. She had CT with IV contrast on 8/4/22. Had CT abd/pelvis today w/o contrast which did not show hydronephrosis, has large ascitis. She had paredes placed in ER with no urine output    Past Medical Hx:   Past Medical History:   Diagnosis Date    Liver disease     HTN    Past Surgical Hx:     Past Surgical History:   Procedure Laterality Date    HX ORTHOPAEDIC Right     hip replacement       Medications:  Prior to Admission medications    Medication Sig Start Date End Date Taking? Authorizing Provider   zolpidem (AMBIEN) 10 mg tablet zolpidem 10 mg tablet   TK 1 T PO QHS PRN    Provider, Historical   promethazine (PHENERGAN) 25 mg tablet promethazine 25 mg tablet    Provider, Historical   predniSONE (DELTASONE) 50 mg tablet prednisone 50 mg tablet    Provider, Historical   oxyCODONE IR (ROXICODONE) 5 mg immediate release tablet oxycodone 5 mg tablet    Provider, Historical   omeprazole (PRILOSEC) 20 mg capsule  8/9/22   Provider, Historical   methylPREDNISolone acetate (DEPO-MEDROL) 80 mg/mL injection 80 mg.    Provider, Historical   LORazepam (ATIVAN) 1 mg tablet lorazepam 1 mg tablet   TAKE ONE TABLET BY MOUTH BID PRN    Provider, Historical   loratadine 10 mg cap loratadine 10 mg capsule   Take 1 capsule every other day by oral route as needed. Provider, Historical   HYDROcodone-acetaminophen (NORCO) 5-325 mg per tablet hydrocodone 5 mg-acetaminophen 325 mg tablet   TK 1 T PO Q 8 H PRN    Provider, Historical   gabapentin (NEURONTIN) 600 mg tablet Take 600 mg by mouth three (3) times daily.     Provider, Historical   DULoxetine (CYMBALTA) 60 mg capsule duloxetine 60 mg capsule,delayed release    Provider, Historical   cyclobenzaprine (FLEXERIL) 10 mg tablet cyclobenzaprine 10 mg tablet    Provider, Historical   carvediloL (COREG) 25 mg tablet carvedilol 25 mg tablet    Provider, Historical   buPROPion XL (WELLBUTRIN XL) 150 mg tablet bupropion HCl  mg 24 hr tablet, extended release   TK 1 T PO QD    Provider, Historical   ARIPiprazole (ABILIFY) 2 mg tablet aripiprazole 2 mg tablet    Provider, Historical   acyclovir (ZOVIRAX) 200 mg capsule acyclovir 200 mg capsule    Provider, Historical       Allergies   Allergen Reactions    Lisinopril Nausea Only    Omeprazole Nausea and Vomiting       Social Hx:  reports that she has been smoking cigarettes. She does not have any smokeless tobacco history on file. She reports that she does not currently use alcohol. History reviewed. No pertinent family history. Review of Systems:  A twelve point review of system was performed today. Pertinent positives and negatives are mentioned in the HPI. The reminder of the ROS is negative and noncontributory. Objective:    Vitals:    Vitals:    08/30/22 1122 08/30/22 1152 08/30/22 1222 08/30/22 1252   BP: (!) 87/53 (!) 87/56 (!) 89/53 (!) 89/52   Pulse: 81 80 83 73   Resp: 15 13 13 13   Temp:   (!) 96.4 °F (35.8 °C)    SpO2: 98% 98% 100% 98%     I&O's:  No intake/output data recorded. Visit Vitals  BP (!) 89/52   Pulse 73   Temp (!) 96.4 °F (35.8 °C)   Resp 13   SpO2 98%       Physical Exam:  General: Jaundiced, sleepy, arousable, falls back in sleep dueing exam  HEENT: Eyes are PERRL. The sclerae with icterus.  .   Neck:Supple,no mass palpable  Lungs : Clears to auscultation Bilaterally, Normal respiratory effort  CVS: RRR, S1 S2 normal, No rub, no  LE edema  Abdomen: Soft, Non tender, Ascitis+, paredes  Extremities: No edema  Skin: jaundice  MS: No joint swelling, erythema, warmth  Neurologic: non focal, sleepy, no astrexis      Laboratory Results:    Recent Results (from the past 24 hour(s))   METABOLIC PANEL, COMPREHENSIVE    Collection Time: 08/30/22  1:52 AM   Result Value Ref Range    Sodium 129 (L) 136 - 145 mmol/L    Potassium 3.8 3.5 - 5.1 mmol/L    Chloride 96 (L) 97 - 108 mmol/L    CO2 19 (L) 21 - 32 mmol/L    Anion gap 14 5 - 15 mmol/L    Glucose 122 (H) 65 - 100 mg/dL    BUN 66 (H) 6 - 20 MG/DL    Creatinine 4.35 (H) 0.55 - 1.02 MG/DL    BUN/Creatinine ratio 15 12 - 20      GFR est AA 13 (L) >60 ml/min/1.73m2    GFR est non-AA 10 (L) >60 ml/min/1.73m2    Calcium 8.3 (L) 8.5 - 10.1 MG/DL    Bilirubin, total 29.7 (H) 0.2 - 1.0 MG/DL    ALT (SGPT) 35 12 - 78 U/L    AST (SGOT) 91 (H) 15 - 37 U/L    Alk. phosphatase 230 (H) 45 - 117 U/L    Protein, total 5.0 (L) 6.4 - 8.2 g/dL    Albumin 1.8 (L) 3.5 - 5.0 g/dL    Globulin 3.2 2.0 - 4.0 g/dL    A-G Ratio 0.6 (L) 1.1 - 2.2     CBC WITH AUTOMATED DIFF    Collection Time: 08/30/22  1:52 AM   Result Value Ref Range    WBC 29.9 (H) 3.6 - 11.0 K/uL    RBC 2.76 (L) 3.80 - 5.20 M/uL    HGB 9.8 (L) 11.5 - 16.0 g/dL    HCT 27.3 (L) 35.0 - 47.0 %    MCV 98.9 80.0 - 99.0 FL    MCH 35.5 (H) 26.0 - 34.0 PG    MCHC 35.9 30.0 - 36.5 g/dL    RDW 18.6 (H) 11.5 - 14.5 %    PLATELET 512 555 - 867 K/uL    MPV 10.6 8.9 - 12.9 FL    NRBC 0.0 0  WBC    ABSOLUTE NRBC 0.00 0.00 - 0.01 K/uL    NEUTROPHILS 83 (H) 32 - 75 %    LYMPHOCYTES 6 (L) 12 - 49 %    MONOCYTES 9 5 - 13 %    EOSINOPHILS 0 0 - 7 %    BASOPHILS 0 0 - 1 %    IMMATURE GRANULOCYTES 2 (H) 0.0 - 0.5 %    ABS. NEUTROPHILS 24.8 (H) 1.8 - 8.0 K/UL    ABS. LYMPHOCYTES 1.8 0.8 - 3.5 K/UL    ABS. MONOCYTES 2.7 (H) 0.0 - 1.0 K/UL    ABS. EOSINOPHILS 0.0 0.0 - 0.4 K/UL    ABS. BASOPHILS 0.0 0.0 - 0.1 K/UL    ABS. IMM.  GRANS. 0.6 (H) 0.00 - 0.04 K/UL    DF SMEAR SCANNED      RBC COMMENTS ANISOCYTOSIS  1+        RBC COMMENTS MACROCYTOSIS  1+       AMMONIA    Collection Time: 08/30/22  1:52 AM   Result Value Ref Range    Ammonia, plasma 11 <32 UMOL/L   PROTHROMBIN TIME + INR    Collection Time: 08/30/22  1:52 AM   Result Value Ref Range    INR 1.8 (H) 0.9 - 1.1      Prothrombin time 18.4 (H) 9.0 - 11.1 sec   SAMPLES BEING HELD Collection Time: 08/30/22  1:52 AM   Result Value Ref Range    SAMPLES BEING HELD 1RED     COMMENT        Add-on orders for these samples will be processed based on acceptable specimen integrity and analyte stability, which may vary by analyte. TSH 3RD GENERATION    Collection Time: 08/30/22  1:52 AM   Result Value Ref Range    TSH 0.77 0.36 - 3.74 uIU/mL   FOLATE    Collection Time: 08/30/22  1:52 AM   Result Value Ref Range    Folate 14.7 5.0 - 21.0 ng/mL   SAMPLES BEING HELD    Collection Time: 08/30/22  5:38 AM   Result Value Ref Range    SAMPLES BEING HELD 1LAV 1PST 2RED     COMMENT        Add-on orders for these samples will be processed based on acceptable specimen integrity and analyte stability, which may vary by analyte.    LACTIC ACID    Collection Time: 08/30/22  5:38 AM   Result Value Ref Range    Lactic acid 1.6 0.4 - 2.0 MMOL/L   PHOSPHORUS    Collection Time: 08/30/22  5:38 AM   Result Value Ref Range    Phosphorus 4.5 2.6 - 4.7 MG/DL   NT-PRO BNP    Collection Time: 08/30/22  5:38 AM   Result Value Ref Range    NT pro-BNP 1,370 (H) <125 PG/ML   MAGNESIUM    Collection Time: 08/30/22  5:38 AM   Result Value Ref Range    Magnesium 2.2 1.6 - 2.4 mg/dL   LIPASE    Collection Time: 08/30/22  5:38 AM   Result Value Ref Range    Lipase 90 73 - 393 U/L   IRON PROFILE    Collection Time: 08/30/22  5:38 AM   Result Value Ref Range    Iron 34 (L) 35 - 150 ug/dL    TIBC 107 (L) 250 - 450 ug/dL    Iron % saturation 32 20 - 50 %   FERRITIN    Collection Time: 08/30/22  5:38 AM   Result Value Ref Range    Ferritin 1,121 (H) 26 - 388 NG/ML   C REACTIVE PROTEIN, QT    Collection Time: 08/30/22  5:38 AM   Result Value Ref Range    C-Reactive protein 10.20 (H) 0.00 - 0.60 mg/dL   HEMOGLOBIN A1C WITH EAG    Collection Time: 08/30/22  5:39 AM   Result Value Ref Range    Hemoglobin A1c <3.8 (L) 4.0 - 5.6 %    Est. average glucose Cannot be calculated mg/dL   VITAMIN B12    Collection Time: 08/30/22 11:18 AM   Result Value Ref Range    Vitamin B12 >2,000 (H) 193 - 951 pg/mL   METABOLIC PANEL, BASIC    Collection Time: 08/30/22 11:18 AM   Result Value Ref Range    Sodium 132 (L) 136 - 145 mmol/L    Potassium 3.5 3.5 - 5.1 mmol/L    Chloride 102 97 - 108 mmol/L    CO2 19 (L) 21 - 32 mmol/L    Anion gap 11 5 - 15 mmol/L    Glucose 95 65 - 100 mg/dL    BUN 66 (H) 6 - 20 MG/DL    Creatinine 4.21 (H) 0.55 - 1.02 MG/DL    BUN/Creatinine ratio 16 12 - 20      GFR est AA 13 (L) >60 ml/min/1.73m2    GFR est non-AA 11 (L) >60 ml/min/1.73m2    Calcium 7.9 (L) 8.5 - 10.1 MG/DL   URINALYSIS W/MICROSCOPIC    Collection Time: 08/30/22 12:08 PM   Result Value Ref Range    Color DARK YELLOW      Appearance TURBID (A) CLEAR      Specific gravity 1.018 1.003 - 1.030      pH (UA) 5.5 5.0 - 8.0      Protein 30 (A) NEG mg/dL    Glucose Negative NEG mg/dL    Ketone Negative NEG mg/dL    Blood LARGE (A) NEG      Urobilinogen 1.0 0.2 - 1.0 EU/dL    Nitrites Positive (A) NEG      Leukocyte Esterase MODERATE (A) NEG      WBC 0-4 0 - 4 /hpf    RBC 20-50 0 - 5 /hpf    Epithelial cells FEW FEW /lpf    Bacteria Negative NEG /hpf   URINE CULTURE HOLD SAMPLE    Collection Time: 08/30/22 12:08 PM    Specimen: Serum   Result Value Ref Range    Urine culture hold        Urine on hold in Microbiology dept for 2 days. If unpreserved urine is submitted, it cannot be used for addtional testing after 24 hours, recollection will be required.    BILIRUBIN, CONFIRM    Collection Time: 08/30/22 12:08 PM   Result Value Ref Range    Bilirubin UA, confirm Positive (A) NEG          Lab Results   Component Value Date    BUN 66 (H) 08/30/2022     (L) 08/30/2022    K 3.5 08/30/2022     08/30/2022    CO2 19 (L) 08/30/2022       Lab Results   Component Value Date    BUN 66 (H) 08/30/2022    BUN 66 (H) 08/30/2022    K 3.5 08/30/2022    K 3.8 08/30/2022       Lab Results   Component Value Date    WBC 29.9 (H) 08/30/2022    RBC 2.76 (L) 08/30/2022    HGB 9.8 (L) 08/30/2022 HCT 27.3 (L) 08/30/2022    MCV 98.9 08/30/2022    MCH 35.5 (H) 08/30/2022    RDW 18.6 (H) 08/30/2022     08/30/2022       Lab Results   Component Value Date    PHOS 4.5 08/30/2022       Urine dipstick:   Lab Results   Component Value Date/Time    Color DARK YELLOW 08/30/2022 12:08 PM    Appearance TURBID (A) 08/30/2022 12:08 PM    Specific gravity 1.018 08/30/2022 12:08 PM    pH (UA) 5.5 08/30/2022 12:08 PM    Protein 30 (A) 08/30/2022 12:08 PM    Glucose Negative 08/30/2022 12:08 PM    Ketone Negative 08/30/2022 12:08 PM    Urobilinogen 1.0 08/30/2022 12:08 PM    Nitrites Positive (A) 08/30/2022 12:08 PM    Leukocyte Esterase MODERATE (A) 08/30/2022 12:08 PM    Epithelial cells FEW 08/30/2022 12:08 PM    Bacteria Negative 08/30/2022 12:08 PM    WBC 0-4 08/30/2022 12:08 PM    RBC 20-50 08/30/2022 12:08 PM       I have reviewed the following: All pertinent labs, microbiology data, radiology imaging for my assessment     ECG- Rev: Yes / No  Xray/CT/US/MRI REV: Yes/ No    Care Plan discussed with: patient,   RN and Mr [facility] NP     Chart reviewed. Total time spent with patient:  45  Medications list Personally Reviewed   [x]      Yes     []               No      Thank you for allowing us to participate in the care of this patient. We will follow patient.  Please dont hesitate to call with any questions    Rolando Le MD  8/30/2022    [address] [facility]

## 2022-08-30 NOTE — ED NOTES
Bedside and Verbal shift change report given to [address], RN (oncoming nurse) by Roberto Murphy RN (offgoing nurse). Report included the following information SBAR, Kardex, ED Summary, [facility] and Recent Results.

## 2022-08-30 NOTE — ED PROVIDER NOTES
41-year-old female presents to the emergency department as a transfer from [facility] with decompensated cirrhosis. Multiple lab abnormalities including RANDY with creatinine 3.3, BUN 60, K3.1, , bicarb 19, T bili markedly elevated at 31.8, AST 89, alk phos 237, ALT 29, INR 1.8, leukocytosis of 30.3, Hg 9.8, she reportedly had a paracentesis while there and was transferred to [facility] for further care and assessment and was accepted by the hospitalist service. On arrival to the emergency department she had some mild abdominal pain but states that she is feeling better than she was before, no other new complaints. Abdominal Pressure   Pertinent negatives include no fever, no diarrhea, no nausea, no vomiting, no constipation, no dysuria, no frequency, no hematuria, no headaches, no arthralgias, no myalgias, no chest pain and no back pain. Jaundice   Pertinent negatives include no fever, no diarrhea, no nausea, no vomiting, no constipation, no dysuria, no frequency, no hematuria, no headaches, no arthralgias, no myalgias, no chest pain and no back pain. Past Medical History:   Diagnosis Date    Liver disease        Past Surgical History:   Procedure Laterality Date    HX ORTHOPAEDIC Right     hip replacement         No family history on file.     Social History     Socioeconomic History    Marital status: SINGLE     Spouse name: Not on file    Number of children: Not on file    Years of education: Not on file    Highest education level: Not on file   Occupational History    Not on file   Tobacco Use    Smoking status: Every Day     Types: Cigarettes    Smokeless tobacco: Not on file   Substance and Sexual Activity    Alcohol use: Not Currently    Drug use: Not on file    Sexual activity: Not on file   Other Topics Concern    Not on file   Social History Narrative    Not on file     Social Determinants of Health     Financial Resource Strain: Not on file   Food Insecurity: Not on file   Transportation Needs: Not on file   Physical Activity: Not on file   Stress: Not on file   Social Connections: Not on file   Intimate Partner Violence: Not on file   Housing Stability: Not on file         ALLERGIES: Lisinopril and Omeprazole    Review of Systems   Constitutional:  Negative for activity change, appetite change, chills and fever. HENT:  Negative for congestion, rhinorrhea, sinus pressure, sneezing and sore throat. Eyes:  Negative for photophobia and visual disturbance. Respiratory:  Negative for cough and shortness of breath. Cardiovascular:  Negative for chest pain. Gastrointestinal:  Positive for abdominal pain, bloating and jaundice. Negative for blood in stool, constipation, diarrhea, nausea and vomiting. Genitourinary:  Negative for difficulty urinating, dysuria, flank pain, frequency, hematuria, menstrual problem, urgency, vaginal bleeding and vaginal discharge. Musculoskeletal:  Negative for arthralgias, back pain, myalgias and neck pain. Skin:  Positive for color change. Negative for rash and wound. Neurological:  Negative for syncope, weakness, numbness and headaches. Psychiatric/Behavioral:  Negative for self-injury and suicidal ideas. All other systems reviewed and are negative. Vitals:    08/30/22 0024   BP: (!) 95/54   Pulse: 80   Resp: 16   Temp: 98.2 °F (36.8 °C)   SpO2: 95%            Physical Exam  Vitals and nursing note reviewed. Constitutional:       General: She is not in acute distress. Appearance: Normal appearance. She is well-developed. She is ill-appearing. She is not diaphoretic. HENT:      Head: Normocephalic and atraumatic. Nose: Nose normal.   Eyes:      General: Scleral icterus present. Extraocular Movements: Extraocular movements intact. Conjunctiva/sclera: Conjunctivae normal.      Pupils: Pupils are equal, round, and reactive to light. Cardiovascular:      Rate and Rhythm: Normal rate and regular rhythm.       Heart sounds: Normal heart sounds. Pulmonary:      Effort: Pulmonary effort is normal.      Breath sounds: Normal breath sounds. Abdominal:      General: There is no distension. Palpations: Abdomen is soft. Tenderness: There is no abdominal tenderness. Comments: RLQ dressing in place from recent paracentesis   Musculoskeletal:         General: No tenderness. Cervical back: Neck supple. Skin:     General: Skin is warm and dry. Coloration: Skin is jaundiced. Neurological:      General: No focal deficit present. Mental Status: She is alert and oriented to person, place, and time. Cranial Nerves: No cranial nerve deficit. Sensory: No sensory deficit. Motor: No weakness. Coordination: Coordination normal.        MDM    81-year-old female presents as a transfer [facility] for decompensated cirrhosis and RANDY and hyperammonemia. Accepted and admitted to the hospitalist service for further care and assessment. Please note that this dictation was completed with [employer], the [employer] voice recognition software. Quite often unanticipated grammatical, syntax, homophones, and other interpretive errors are inadvertently transcribed by the computer software. Please disregard these errors. Please excuse any errors that have escaped final proofreading. Procedures  141 EKG shows normal sinus rhythm with a rate of 84 bpm with prolonged QT with QTC of 510 ms but no acute ST or T wave abnormalities suggestive of ischemia. Perfect Serve Consult for Admission  12:32 AM    ED Room Number: ER10/10  Patient Name and age:  Marsha Brooks 65 y.o.  female  Working Diagnosis:   1. Alcoholic cirrhosis of liver with ascites (HCC)    2. Leukocytosis, unspecified type    3. Hyperammonemia (HCC)    4. Jaundice    5.  RANDY (acute kidney injury) ([facility]        COVID-19 Suspicion:  no  Sepsis present:  no  Reassessment needed: no  Code Status:  Full Code  Readmission: no  Isolation Requirements:  no  Recommended Level of Care:  step down  Department:[facility] Adult ED - [phone]  Other: Transfer from [facility]. Records at the bedside.

## 2022-08-30 NOTE — ROUTINE PROCESS
TRANSFER - OUT REPORT:    Verbal report given to PASHA Frias (name[facility]  [facility] to [facility](unit) for routine progression of care       Report consisted of patients Situation, Background, Assessment and   Recommendations(SBAR). Information from the following report(s) SBAR and MAR was reviewed with the receiving nurse. Lines:   Peripheral IV 08/30/22 Left Antecubital (Active)        Opportunity for questions and clarification was provided.       Patient transported with:   Monitor  Tech

## 2022-08-31 ENCOUNTER — APPOINTMENT (OUTPATIENT)
Dept: ULTRASOUND IMAGING | Age: 62
DRG: 280 | End: 2022-08-31
Attending: NURSE PRACTITIONER
Payer: MEDICAID

## 2022-08-31 PROBLEM — E43 SEVERE PROTEIN-CALORIE MALNUTRITION (HCC): Chronic | Status: ACTIVE | Noted: 2022-08-31

## 2022-08-31 PROBLEM — E43 SEVERE PROTEIN-CALORIE MALNUTRITION (HCC): Status: ACTIVE | Noted: 2022-08-31

## 2022-08-31 LAB
ALBUMIN SERPL-MCNC: 2.6 G/DL (ref 3.5–5)
ALBUMIN/GLOB SERPL: 1.3 {RATIO} (ref 1.1–2.2)
ALP SERPL-CCNC: 163 U/L (ref 45–117)
ALT SERPL-CCNC: 25 U/L (ref 12–78)
ANION GAP SERPL CALC-SCNC: 14 MMOL/L (ref 5–15)
AST SERPL-CCNC: 70 U/L (ref 15–37)
ATRIAL RATE: 84 BPM
BASOPHILS # BLD: 0 K/UL (ref 0–0.1)
BASOPHILS NFR BLD: 0 % (ref 0–1)
BILIRUB SERPL-MCNC: 27.4 MG/DL (ref 0.2–1)
BUN SERPL-MCNC: 69 MG/DL (ref 6–20)
BUN/CREAT SERPL: 16 (ref 12–20)
CALCIUM SERPL-MCNC: 8.2 MG/DL (ref 8.5–10.1)
CALCULATED P AXIS, ECG09: 12 DEGREES
CALCULATED R AXIS, ECG10: 65 DEGREES
CALCULATED T AXIS, ECG11: 17 DEGREES
CHLORIDE SERPL-SCNC: 101 MMOL/L (ref 97–108)
CO2 SERPL-SCNC: 16 MMOL/L (ref 21–32)
CREAT SERPL-MCNC: 4.45 MG/DL (ref 0.55–1.02)
DIAGNOSIS, 93000: NORMAL
DIFFERENTIAL METHOD BLD: ABNORMAL
EOSINOPHIL # BLD: 0.1 K/UL (ref 0–0.4)
EOSINOPHIL NFR BLD: 1 % (ref 0–7)
ERYTHROCYTE [DISTWIDTH] IN BLOOD BY AUTOMATED COUNT: 18.2 % (ref 11.5–14.5)
GLOBULIN SER CALC-MCNC: 2 G/DL (ref 2–4)
GLUCOSE FLD-MCNC: 80 MG/DL
GLUCOSE SERPL-MCNC: 74 MG/DL (ref 65–100)
HCT VFR BLD AUTO: 22 % (ref 35–47)
HGB BLD-MCNC: 7.8 G/DL (ref 11.5–16)
IMM GRANULOCYTES # BLD AUTO: 0.3 K/UL (ref 0–0.04)
IMM GRANULOCYTES NFR BLD AUTO: 2 % (ref 0–0.5)
INR PPP: 2.1 (ref 0.9–1.1)
LDH FLD L TO P-CCNC: 28 U/L
LYMPHOCYTES # BLD: 1.3 K/UL (ref 0.8–3.5)
LYMPHOCYTES NFR BLD: 8 % (ref 12–49)
MCH RBC QN AUTO: 35.5 PG (ref 26–34)
MCHC RBC AUTO-ENTMCNC: 35.5 G/DL (ref 30–36.5)
MCV RBC AUTO: 100 FL (ref 80–99)
MONOCYTES # BLD: 1.5 K/UL (ref 0–1)
MONOCYTES NFR BLD: 9 % (ref 5–13)
NEUTS SEG # BLD: 13.4 K/UL (ref 1.8–8)
NEUTS SEG NFR BLD: 80 % (ref 32–75)
NRBC # BLD: 0 K/UL (ref 0–0.01)
NRBC BLD-RTO: 0 PER 100 WBC
P-R INTERVAL, ECG05: 128 MS
PLATELET # BLD AUTO: 222 K/UL (ref 150–400)
PMV BLD AUTO: 10.9 FL (ref 8.9–12.9)
POTASSIUM SERPL-SCNC: 3.6 MMOL/L (ref 3.5–5.1)
PROT SERPL-MCNC: 4.6 G/DL (ref 6.4–8.2)
PROTHROMBIN TIME: 20.7 SEC (ref 9–11.1)
Q-T INTERVAL, ECG07: 432 MS
QRS DURATION, ECG06: 88 MS
QTC CALCULATION (BEZET), ECG08: 510 MS
RBC # BLD AUTO: 2.2 M/UL (ref 3.8–5.2)
SODIUM SERPL-SCNC: 131 MMOL/L (ref 136–145)
SPECIMEN SOURCE FLD: NORMAL
SPECIMEN SOURCE FLD: NORMAL
VENTRICULAR RATE, ECG03: 84 BPM
WBC # BLD AUTO: 16.7 K/UL (ref 3.6–11)

## 2022-08-31 PROCEDURE — 36415 COLL VENOUS BLD VENIPUNCTURE: CPT

## 2022-08-31 PROCEDURE — 80053 COMPREHEN METABOLIC PANEL: CPT

## 2022-08-31 PROCEDURE — 74011250637 HC RX REV CODE- 250/637: Performed by: INTERNAL MEDICINE

## 2022-08-31 PROCEDURE — 74011250636 HC RX REV CODE- 250/636: Performed by: INTERNAL MEDICINE

## 2022-08-31 PROCEDURE — 74011000258 HC RX REV CODE- 258: Performed by: INTERNAL MEDICINE

## 2022-08-31 PROCEDURE — 99233 SBSQ HOSP IP/OBS HIGH 50: CPT | Performed by: INTERNAL MEDICINE

## 2022-08-31 PROCEDURE — 74011250637 HC RX REV CODE- 250/637: Performed by: NURSE PRACTITIONER

## 2022-08-31 PROCEDURE — 0W9G30Z DRAINAGE OF PERITONEAL CAVITY WITH DRAINAGE DEVICE, PERCUTANEOUS APPROACH: ICD-10-PCS | Performed by: RADIOLOGY

## 2022-08-31 PROCEDURE — 49083 ABD PARACENTESIS W/IMAGING: CPT

## 2022-08-31 PROCEDURE — 88112 CYTOPATH CELL ENHANCE TECH: CPT

## 2022-08-31 PROCEDURE — 87205 SMEAR GRAM STAIN: CPT

## 2022-08-31 PROCEDURE — 88305 TISSUE EXAM BY PATHOLOGIST: CPT

## 2022-08-31 PROCEDURE — 85025 COMPLETE CBC W/AUTO DIFF WBC: CPT

## 2022-08-31 PROCEDURE — P9047 ALBUMIN (HUMAN), 25%, 50ML: HCPCS | Performed by: INTERNAL MEDICINE

## 2022-08-31 PROCEDURE — 74011000250 HC RX REV CODE- 250: Performed by: INTERNAL MEDICINE

## 2022-08-31 PROCEDURE — 82945 GLUCOSE OTHER FLUID: CPT

## 2022-08-31 PROCEDURE — 84311 SPECTROPHOTOMETRY: CPT

## 2022-08-31 PROCEDURE — 83615 LACTATE (LD) (LDH) ENZYME: CPT

## 2022-08-31 PROCEDURE — 65660000001 HC RM ICU INTERMED STEPDOWN

## 2022-08-31 PROCEDURE — 85610 PROTHROMBIN TIME: CPT

## 2022-08-31 PROCEDURE — 87015 SPECIMEN INFECT AGNT CONCNTJ: CPT

## 2022-08-31 PROCEDURE — 74011000250 HC RX REV CODE- 250: Performed by: PHYSICIAN ASSISTANT

## 2022-08-31 PROCEDURE — C9113 INJ PANTOPRAZOLE SODIUM, VIA: HCPCS | Performed by: INTERNAL MEDICINE

## 2022-08-31 RX ORDER — LIDOCAINE HYDROCHLORIDE 10 MG/ML
8 INJECTION, SOLUTION EPIDURAL; INFILTRATION; INTRACAUDAL; PERINEURAL
Status: COMPLETED | OUTPATIENT
Start: 2022-08-31 | End: 2022-08-31

## 2022-08-31 RX ORDER — MIDODRINE HYDROCHLORIDE 5 MG/1
10 TABLET ORAL EVERY 8 HOURS
Status: DISCONTINUED | OUTPATIENT
Start: 2022-08-31 | End: 2022-09-02

## 2022-08-31 RX ORDER — ALBUMIN HUMAN 250 G/1000ML
25 SOLUTION INTRAVENOUS EVERY 6 HOURS
Status: DISCONTINUED | OUTPATIENT
Start: 2022-08-31 | End: 2022-09-07

## 2022-08-31 RX ORDER — SODIUM BICARBONATE 42 MG/ML
2 INJECTION, SOLUTION INTRAVENOUS
Status: COMPLETED | OUTPATIENT
Start: 2022-08-31 | End: 2022-08-31

## 2022-08-31 RX ADMIN — SODIUM CHLORIDE, PRESERVATIVE FREE 2 G: 5 INJECTION INTRAVENOUS at 04:39

## 2022-08-31 RX ADMIN — BUPROPION HYDROCHLORIDE 150 MG: 150 TABLET, EXTENDED RELEASE ORAL at 10:41

## 2022-08-31 RX ADMIN — Medication 1 CAPSULE: at 10:41

## 2022-08-31 RX ADMIN — ALBUMIN (HUMAN) 25 G: 0.25 INJECTION, SOLUTION INTRAVENOUS at 23:55

## 2022-08-31 RX ADMIN — PANTOPRAZOLE SODIUM 40 MG: 40 INJECTION, POWDER, FOR SOLUTION INTRAVENOUS at 07:00

## 2022-08-31 RX ADMIN — HEPARIN SODIUM 5000 UNITS: 5000 INJECTION INTRAVENOUS; SUBCUTANEOUS at 10:41

## 2022-08-31 RX ADMIN — ALBUMIN (HUMAN) 25 G: 0.25 INJECTION, SOLUTION INTRAVENOUS at 18:03

## 2022-08-31 RX ADMIN — SODIUM BICARBONATE 2 MG: 42 INJECTION, SOLUTION INTRAVENOUS at 15:37

## 2022-08-31 RX ADMIN — SODIUM CHLORIDE, PRESERVATIVE FREE 10 ML: 5 INJECTION INTRAVENOUS at 07:03

## 2022-08-31 RX ADMIN — ALBUMIN (HUMAN) 25 G: 0.25 INJECTION, SOLUTION INTRAVENOUS at 07:00

## 2022-08-31 RX ADMIN — SODIUM CHLORIDE, PRESERVATIVE FREE 5 ML: 5 INJECTION INTRAVENOUS at 14:00

## 2022-08-31 RX ADMIN — SODIUM CHLORIDE, PRESERVATIVE FREE 10 ML: 5 INJECTION INTRAVENOUS at 21:08

## 2022-08-31 RX ADMIN — HEPARIN SODIUM 5000 UNITS: 5000 INJECTION INTRAVENOUS; SUBCUTANEOUS at 23:55

## 2022-08-31 RX ADMIN — ARIPIPRAZOLE 2 MG: 2 TABLET ORAL at 10:41

## 2022-08-31 RX ADMIN — MIDODRINE HYDROCHLORIDE 10 MG: 5 TABLET ORAL at 14:35

## 2022-08-31 RX ADMIN — ALBUMIN (HUMAN) 25 G: 0.25 INJECTION, SOLUTION INTRAVENOUS at 12:55

## 2022-08-31 RX ADMIN — MIDODRINE HYDROCHLORIDE 10 MG: 5 TABLET ORAL at 21:08

## 2022-08-31 RX ADMIN — LIDOCAINE HYDROCHLORIDE 8 ML: 10 INJECTION, SOLUTION EPIDURAL; INFILTRATION; INTRACAUDAL; PERINEURAL at 15:36

## 2022-08-31 RX ADMIN — PHYTONADIONE 10 MG: 10 INJECTION, EMULSION INTRAMUSCULAR; INTRAVENOUS; SUBCUTANEOUS at 10:40

## 2022-08-31 RX ADMIN — MIDODRINE HYDROCHLORIDE 5 MG: 5 TABLET ORAL at 07:01

## 2022-08-31 NOTE — PROGRESS NOTES
0730: Bedside and Verbal shift change report given to PASHA Frias (oncoming nurse) by [facility], RN (offgoing nurse). Report included the following information SBAR, Kardex, MAR, and Cardiac Rhythm NSR .     0900: JV Harris notified of decreased UOP. 1549: RLQ paracentesis drain placed by ultrasound. 1716: Dark tan fluid of 2400 mL drained from RLQ paracentesis drain. NP Lindaann Lundborg and MD Min notified. Per MD Otjakob Gear it drain\". 1944: Dark tan fluid of 2750 mL drained from RLQ paracentesis drain. 1930: Bedside and Verbal shift change report given to [facility], RN (oncoming nurse) by Danna Lemos RN (offgoing nurse). Report included the following information SBAR, Kardex, MAR, and Cardiac Rhythm NSR .     2000: MD Lorenzo Olivera at bedside. Re-clarified about RLQ paracentesis drainage output and per MD continue to let it drain. Per MD Lorenzo Olivera hold off on NGT tonight for possible alternative treatment plans in the morning. Nightshift PASHA luna.

## 2022-08-31 NOTE — PROGRESS NOTES
[facility] Adult  Hospitalist Group                                                                                          Hospitalist Progress Note  Irina Marshall NP  Answering service: [phone] OR 36 from in house phone        Date of Service:  2022  NAME:  Feroz Henderson  :  1960  MRN:  [id number]      Admission Summary:   Per H&P, This is a 73-year-old woman with a past medical history significant for hypertension, alcoholic liver cirrhosis, who presented at the emergency room with abdominal pain. The patient presented at [facility] Emergency Room. When the patient arrived at the emergency room, the patient was found to have decompensation of liver cirrhosis. The hospitalist service at [facility] was asked to directly admit the patient to [facility], so that the patient can be seen by the hepatologist.  The patient came to the emergency room at [facility], because of lack of bed on the on the medical floor. When the patient arrived at the emergency room, the patient's lab work shows significant abnormality. She was subsequently referred to the hospitalist service for admission. No record of prior admission to this hospital.  It was reported that the patient underwent abdominal paracentesis before coming to [facility].  The amount of fluid removed is not known. The patient's abdominal pain is diffuse in location, dull ache, constant, 7/10 in severity with no known aggravating or relieving factors associated with nausea, but no vomiting, no diarrhea, and no constipation. Interval history / Subjective:   I saw the patient this morning on rounds.   Much more alert and interactive today       Assessment & Plan:        Decompensated cirrhosis of the liver  Transferred from outside hospital, did have complaints of abdominal pain previously however not on my exam  Does have leukocytosis 29 with elevated CRP  Paracentesis, diagnostic labs have been ordered. Patient has no rebound however will need to rule out SBP  She did have EGD on 8/8 at 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 Due to her disease process  Per hepatology will start tube feeds  Consulting with RD    Acute kidney injury  According to outside records, had normal creatinine earlier in the month  Nephrology following, appreciate your help  Creatinine 4.21  Likely intravascularly dry versus hepatorenal versus ATN versus other  Avoiding nephrotoxins    Hypotension  Blood pressure were low at presentation, still remain soft however maps about 60  Continuing albumin  Nephrology has increased midodrine    Hypertension  Blood pressure soft, holding antihypertensives    Code status: FULL  Prophylaxis: SCD  Care Plan discussed with: Patient, nephrology, nurse  Anticipated Disposition: Likely discharge in several days     Hospital Problems  Date Reviewed: 8/30/2022            Codes Class Noted POA    * (Principal) Alcoholic cirrhosis of liver with ascites ([facility] [address] ICD-10-CM: K70.31  ICD-9-CM: 571.2  8/30/2022 Yes           Review of Systems:   A comprehensive review of systems was negative except for that written in the HPI. Vital Signs:    Last 24hrs VS reviewed since prior progress note.  Most recent are:  Visit Vitals  BP (!) 95/45   Pulse 84   Temp 97.3 °F (36.3 °C)   Resp 16   Ht 5' 3\" (1.6 m)   Wt 51.3 kg (113 lb 1.6 oz)   SpO2 98%   BMI 20.03 kg/m²         Intake/Output Summary (Last 24 hours) at 8/31/2022 1356  Last data filed at 8/31/2022 0330  Gross per 24 hour   Intake 2845 ml   Output 150 ml   Net 2695 ml          Physical Examination:     I had a face to face encounter with this patient and independently examined them on 8/31/2022 as outlined below:          Constitutional:  No acute distress, cooperative, pleasant    ENT:  Oral mucosa moist, oropharynx benign. Resp:  CTA bilaterally. No wheezing/rhonchi/rales. No accessory muscle use. CV:  Regular rhythm, normal rate,     GI: Soft, distended    Musculoskeletal:    No edema, warm, 2+ pulses throughout jaundiced    Neurologic:  Moves all extremities. Ox2, no asterixis            Data Review:    Review and/or order of clinical lab test  Review and/or order of tests in the radiology section of [facility]      Labs:     Recent Labs     08/31/22 0448 08/30/22 0152   WBC 16.7* 29.9*   HGB 7.8* 9.8*   HCT 22.0* 27.3*    330       Recent Labs     08/31/22 0448 08/30/22  1118 08/30/22 0538 08/30/22 0152   * 132*  --  129*   K 3.6 3.5  --  3.8    102  --  96*   CO2 16* 19*  --  19*   BUN 69* 66*  --  66*   CREA 4.45* 4.21*  --  4.35*   GLU 74 95  --  122*   CA 8.2* 7.9*  --  8.3*   MG  --   --  2.2  --    PHOS  --   --  4.5  --    URICA  --  8.3*  --   --        Recent Labs     08/31/22 0448 08/30/22 0538 08/30/22 0152   ALT 25  --  35   *  --  230*   TBILI 27.4*  --  29.7*   TP 4.6*  --  5.0*   ALB 2.6*  --  1.8*   GLOB 2.0  --  3.2   LPSE  --  90  --        Recent Labs     08/31/22 0448 08/30/22 0152   INR 2.1* 1.8*   PTP 20.7* 18.4*        Recent Labs     08/30/22 0538   TIBC 107*   PSAT 32   FERR 1,121*        Lab Results   Component Value Date/Time    Folate 14.7 08/30/2022 01:52 AM        No results for input(s): PH, PCO2, PO2 in the last 72 hours. No results for input(s): CPK, CKNDX, TROIQ in the last 72 hours.     No lab exists for component: CPKMB  No results found for: CHOL, CHOLX, CHLST, CHOLV, HDL, HDLP, LDL, LDLC, DLDLP, TGLX, TRIGL, TRIGP, CHHD, CHHDX  No results found for: GLUCPOC  Lab Results   Component Value Date/Time    Color DARK YELLOW 08/30/2022 12:08 PM    Appearance TURBID (A) 08/30/2022 12:08 PM    Specific gravity 1.018 08/30/2022 12:08 PM    pH (UA) 5.5 08/30/2022 12:08 PM    Protein 30 (A) 08/30/2022 12:08 PM    Glucose Negative 08/30/2022 12:08 PM    Ketone Negative 08/30/2022 12:08 PM    Urobilinogen 1.0 08/30/2022 12:08 PM    Nitrites Positive (A) 08/30/2022 12:08 PM    Leukocyte Esterase MODERATE (A) 08/30/2022 12:08 PM    Epithelial cells FEW 08/30/2022 12:08 PM    Bacteria Negative 08/30/2022 12:08 PM    WBC 0-4 08/30/2022 12:08 PM    RBC 20-50 08/30/2022 12:08 PM         Medications Reviewed:     Current Facility-Administered Medications   Medication Dose Route Frequency    albumin human 25% (BUMINATE) solution 25 g  25 g IntraVENous Q6H    phytonadione (vitamin K1) (AQUA-MEPHYTON) 10 mg in 0.9% sodium chloride 50 mL IVPB  10 mg IntraVENous DAILY    midodrine (PROAMATINE) tablet 10 mg  10 mg Oral Q8H    oxyCODONE IR (ROXICODONE) tablet 5 mg  5 mg Oral Q4H PRN    cefTRIAXone (ROCEPHIN) 2 g in 0.9% sodium chloride 20 mL IV syringe  2 g IntraVENous Q24H    0.9% sodium chloride infusion  50 mL/hr IntraVENous CONTINUOUS    ARIPiprazole (ABILIFY) tablet 2 mg  2 mg Oral DAILY    buPROPion SR (WELLBUTRIN SR) tablet 150 mg  150 mg Oral DAILY    LORazepam (ATIVAN) tablet 1 mg  1 mg Oral BID PRN    pantoprazole (PROTONIX) 40 mg in 0.9% sodium chloride 10 mL injection  40 mg IntraVENous ACB    sodium chloride (NS) flush 5-40 mL  5-40 mL IntraVENous Q8H    sodium chloride (NS) flush 5-40 mL  5-40 mL IntraVENous PRN    acetaminophen (TYLENOL) tablet 650 mg  650 mg Oral Q6H PRN    Or    acetaminophen (TYLENOL) suppository 650 mg  650 mg Rectal Q6H PRN    polyethylene glycol (MIRALAX) packet 17 g  17 g Oral DAILY PRN    ondansetron (ZOFRAN ODT) tablet 4 mg  4 mg Oral Q8H PRN    Or    ondansetron (ZOFRAN) injection 4 mg  4 mg IntraVENous Q6H PRN    heparin (porcine) injection 5,000 Units  5,000 Units SubCUTAneous Q8H    L.acidophilus-paracasei-S.thermophil-bifidobacter (RISAQUAD) 8 billion cell capsule  1 Capsule Oral DAILY    nicotine (NICODERM CQ) 21 mg/24 hr patch 1 Patch  1 Patch TransDERmal DAILY     ______________________________________________________________________  EXPECTED LENGTH OF STAY: 4d 16h  ACTUAL LENGTH OF STAY:          Niranjan Jose NP

## 2022-08-31 NOTE — PROGRESS NOTES
Renal Progress Note    NAME:  Angelica Mukherjee   :   1960   MRN:   [id number]     Date/Time:  2022 12:09 PM  Subjective:       , feels ok, no N/v/SOB.  Worry about her pets        Medications reviewed:  Current Facility-Administered Medications   Medication Dose Route Frequency    albumin human 25% (BUMINATE) solution 25 g  25 g IntraVENous Q6H    phytonadione (vitamin K1) (AQUA-MEPHYTON) 10 mg in 0.9% sodium chloride 50 mL IVPB  10 mg IntraVENous DAILY    oxyCODONE IR (ROXICODONE) tablet 5 mg  5 mg Oral Q4H PRN    cefTRIAXone (ROCEPHIN) 2 g in 0.9% sodium chloride 20 mL IV syringe  2 g IntraVENous Q24H    0.9% sodium chloride infusion  100 mL/hr IntraVENous CONTINUOUS    ARIPiprazole (ABILIFY) tablet 2 mg  2 mg Oral DAILY    buPROPion SR (WELLBUTRIN SR) tablet 150 mg  150 mg Oral DAILY    LORazepam (ATIVAN) tablet 1 mg  1 mg Oral BID PRN    pantoprazole (PROTONIX) 40 mg in 0.9% sodium chloride 10 mL injection  40 mg IntraVENous ACB    sodium chloride (NS) flush 5-40 mL  5-40 mL IntraVENous Q8H    sodium chloride (NS) flush 5-40 mL  5-40 mL IntraVENous PRN    acetaminophen (TYLENOL) tablet 650 mg  650 mg Oral Q6H PRN    Or    acetaminophen (TYLENOL) suppository 650 mg  650 mg Rectal Q6H PRN    polyethylene glycol (MIRALAX) packet 17 g  17 g Oral DAILY PRN    ondansetron (ZOFRAN ODT) tablet 4 mg  4 mg Oral Q8H PRN    Or    ondansetron (ZOFRAN) injection 4 mg  4 mg IntraVENous Q6H PRN    heparin (porcine) injection 5,000 Units  5,000 Units SubCUTAneous Q8H    L.acidophilus-paracasei-S.thermophil-bifidobacter (RISAQUAD) 8 billion cell capsule  1 Capsule Oral DAILY    nicotine (NICODERM CQ) 21 mg/24 hr patch 1 Patch  1 Patch TransDERmal DAILY    midodrine (PROAMATINE) tablet 5 mg  5 mg Oral Q8H        Objective:   Vitals:  Visit Vitals  BP (!) 104/37 (BP 1 Location: Right upper arm, BP Patient Position: Semi fowlers)   Pulse 77   Temp 97.3 °F (36.3 °C)   Resp 17   Wt 51.3 kg (113 lb 1.6 oz)   SpO2 100% Temp (24hrs), Av.5 °F (35.8 °C), Min:94.5 °F (34.7 °C), Max:97.6 °F (36.4 °C)      O2 Device: None (Room air)    Last 24hr Input/Output:    Intake/Output Summary (Last 24 hours) at 2022 1209  Last data filed at 2022 0330  Gross per 24 hour   Intake 2845 ml   Output 150 ml   Net 2695 ml        Physical Exam:  General: Jaundiced, awake and alert   HEENT:  The sclerae icteric  Neck: Supple,  Lungs : Clears to auscultation Bilaterally, Normal respiratory effort  CVS: RRR, S1 S2 normal, No rub, no  LE edema  Abdomen: Soft, Non tender, distended,  Ascitis+, paredes  Extremities: No edema  Skin: jaundice  MS: No joint swelling, erythema, warmth  Neurologic: awake and alert, nl speech [] Intubated on vent    Lab Data Reviewed:    Recent Results (from the past 24 hour(s))   POC VENOUS BLOOD GAS    Collection Time: 22  2:40 PM   Result Value Ref Range    pH, venous (POC) 7.39 7.32 - 7.42      pCO2, venous (POC) 29.5 (L) 41 - 51 MMHG    pO2, venous (POC) 30 25 - 40 mmHg    HCO3, venous (POC) 17.9 (L) 23.0 - 28.0 MMOL/L    sO2, venous (POC) 57.8 (L) 65 - 88 %    Base deficit, venous (POC) 6.2 mmol/L    Specimen type (POC) VENOUS BLOOD      Performed by Jeralyn Meigs    METABOLIC PANEL, COMPREHENSIVE    Collection Time: 22  4:48 AM   Result Value Ref Range    Sodium 131 (L) 136 - 145 mmol/L    Potassium 3.6 3.5 - 5.1 mmol/L    Chloride 101 97 - 108 mmol/L    CO2 16 (L) 21 - 32 mmol/L    Anion gap 14 5 - 15 mmol/L    Glucose 74 65 - 100 mg/dL    BUN 69 (H) 6 - 20 MG/DL    Creatinine 4.45 (H) 0.55 - 1.02 MG/DL    BUN/Creatinine ratio 16 12 - 20      GFR est AA 12 (L) >60 ml/min/1.73m2    GFR est non-AA 10 (L) >60 ml/min/1.73m2    Calcium 8.2 (L) 8.5 - 10.1 MG/DL    Bilirubin, total 27.4 (H) 0.2 - 1.0 MG/DL    ALT (SGPT) 25 12 - 78 U/L    AST (SGOT) 70 (H) 15 - 37 U/L    Alk.  phosphatase 163 (H) 45 - 117 U/L    Protein, total 4.6 (L) 6.4 - 8.2 g/dL    Albumin 2.6 (L) 3.5 - 5.0 g/dL    Globulin 2.0 2.0 - 4.0 g/dL    A-G Ratio 1.3 1.1 - 2.2     CBC WITH AUTOMATED DIFF    Collection Time: 08/31/22  4:48 AM   Result Value Ref Range    WBC 16.7 (H) 3.6 - 11.0 K/uL    RBC 2.20 (L) 3.80 - 5.20 M/uL    HGB 7.8 (L) 11.5 - 16.0 g/dL    HCT 22.0 (L) 35.0 - 47.0 %    .0 (H) 80.0 - 99.0 FL    MCH 35.5 (H) 26.0 - 34.0 PG    MCHC 35.5 30.0 - 36.5 g/dL    RDW 18.2 (H) 11.5 - 14.5 %    PLATELET 914 730 - 253 K/uL    MPV 10.9 8.9 - 12.9 FL    NRBC 0.0 0  WBC    ABSOLUTE NRBC 0.00 0.00 - 0.01 K/uL    NEUTROPHILS 80 (H) 32 - 75 %    LYMPHOCYTES 8 (L) 12 - 49 %    MONOCYTES 9 5 - 13 %    EOSINOPHILS 1 0 - 7 %    BASOPHILS 0 0 - 1 %    IMMATURE GRANULOCYTES 2 (H) 0.0 - 0.5 %    ABS. NEUTROPHILS 13.4 (H) 1.8 - 8.0 K/UL    ABS. LYMPHOCYTES 1.3 0.8 - 3.5 K/UL    ABS. MONOCYTES 1.5 (H) 0.0 - 1.0 K/UL    ABS. EOSINOPHILS 0.1 0.0 - 0.4 K/UL    ABS. BASOPHILS 0.0 0.0 - 0.1 K/UL    ABS. IMM. GRANS. 0.3 (H) 0.00 - 0.04 K/UL    DF AUTOMATED     PROTHROMBIN TIME + INR    Collection Time: 08/31/22  4:48 AM   Result Value Ref Range    INR 2.1 (H) 0.9 - 1.1      Prothrombin time 20.7 (H) 9.0 - 11.1 sec         Assessment/Plan:   Principal Problem:    Alcoholic cirrhosis of liver with ascites ([facility] [address] (8/30/2022)       Acute kidney injury -cr 0.4 in early August,   -RANDY/possible ATN due to intravascular volume depletion in setting of poor po intake, hypotension /third spacing and possible sepsis/NSAIDs. Hepatorenal in DDx but less likely. Low FeNa-low urine output  -agree with IV albumin  -increase midodrine 10 mg bid  -decrease IVF  -recommends therapeutic paracentesis  -strict Is and Os  -avoid nephrotoxins, IV contrast/NSAIDs  -dose meds per GFR  -no need for RRT at this time     Hypotension-with h/o HTN on Coreg, due to effective intravascular volume depletion  -on prednisone per med list, but patient denies taking that.  Although she is not fully oriented.   -midodrine     Hyponatremia, due to liver cirrhosis and volume depletion, chronic since early August  -improving with IVF  -Avoid rapid correction  -improving     Alcoholic liver cirrhosis-decompensated.    -Hypoalbuminemia/ascitis/coagulopathy-nl ammonia  -hepatology following    Possible SBP, on Rocephine  H/o HTN, hold Coreg  Anemia  ___________________________________________________    Total time spent with patient:  [] 15   [] 25   [] 35   []  __ minutes    [] Critical Care Provided    Care Plan discussed with:    [x] Patient   [] Family    [] Care Manager   [] Consultant/Specialist :      []   >50% of visit spent in counseling and coordination of care   (Discussed [] CODE status,  [] Care Plan, [] D/C Planning)    ___________________________________________________    Attending Physician: Kelly Awan, [facility]

## 2022-08-31 NOTE — PROGRESS NOTES
2000 Verbal shift change report given to Bg Manzano (oncoming nurse) by Priyank Farmer (offgoing nurse). Report included the following information SBAR, Kardex, ED Summary, Intake/Output, MAR, Recent Results, Cardiac Rhythm NSR, and Alarm Parameters . Pt not on unit yet - awaiting arrival from ED    2100 Pt arrived to unit from ED. Positioned into bed. Dual skin assessment performed by this RN & [facility], RN. Red & blanchable areas noted on pt's sacrum & bilateral heels. Foam dressings applied. 2130 Unable to obtain pt temperature orally. Rectal temp noted as 94.4. Orders received from [facility], NP to start pt on [employer]. BP also noted as 89/43. Orders received from [facility], NP to administer one time dose of Midodrine (see MAR). 2330 BP 89/41, scheduled Albumin given (see MAR). 0100 Pt rectal temp 98.7. Sandy hugger temp weaned down. 0200 Pt rectal temp 98.5. Sandy hugger temp weaned down.   0300 Pt rectal temp 98.6. Sandy hugger cut off.     0730 Bedside shift change report given to Altagracia (oncoming nurse) by Bg Manzano (offgoing nurse). Report included the following information SBAR, Kardex, Intake/Output, MAR, Recent Results, and Cardiac Rhythm NSR . Pt rectal temp at 97.2. Sandy hugger restarted on lowest setting. Problem: Falls - Risk of  Goal: *Absence of Falls  Description: Document Sha Choi Fall Risk and appropriate interventions in the flowsheet.   Outcome: Progressing Towards Goal  Note: Fall Risk Interventions:  Mobility Interventions: Assess mobility with egress test, Bed/chair exit alarm, Communicate number of staff needed for ambulation/transfer, OT consult for ADLs, Patient to call before getting OOB, PT Consult for mobility concerns, Strengthening exercises (ROM-active/passive)         Medication Interventions: Bed/chair exit alarm, Evaluate medications/consider consulting pharmacy, Patient to call before getting OOB, Teach patient to arise slowly    Elimination Interventions: Bed/chair exit alarm, Call light in reach, Patient to call for help with toileting needs, Stay With Me (per policy), Toileting schedule/hourly rounds              Problem: Patient Education: Go to Patient Education Activity  Goal: Patient/Family Education  Outcome: Progressing Towards Goal     Problem: Pressure Injury - Risk of  Goal: *Prevention of pressure injury  Description: Document Chandana Scale and appropriate interventions in the flowsheet. Outcome: Progressing Towards Goal  Note: Pressure Injury Interventions:  Sensory Interventions: Assess changes in LOC, Assess need for specialty bed, Avoid rigorous massage over bony prominences, Check visual cues for pain, Discuss PT/OT consult with provider, Float heels, Keep linens dry and wrinkle-free, Maintain/enhance activity level, Minimize linen layers, Pressure redistribution bed/mattress (bed type), Turn and reposition approx. every two hours (pillows and wedges if needed)    Moisture Interventions: Absorbent underpads, Assess need for specialty bed, Internal/External urinary devices, Limit adult briefs, Minimize layers    Activity Interventions: Assess need for specialty bed, Increase time out of bed, Pressure redistribution bed/mattress(bed type), PT/OT evaluation    Mobility Interventions: Assess need for specialty bed, Float heels, Pressure redistribution bed/mattress (bed type), PT/OT evaluation, Turn and reposition approx.  every two hours(pillow and wedges)    Nutrition Interventions: Document food/fluid/supplement intake, Discuss nutritional consult with provider    Friction and Shear Interventions: Apply protective barrier, creams and emollients, Foam dressings/transparent film/skin sealants, Feet elevated on foot rest, Lift sheet, Minimize layers                Problem: Patient Education: Go to Patient Education Activity  Goal: Patient/Family Education  Outcome: Progressing Towards Goal     Problem: Hypotension  Goal: *Blood pressure within specified parameters  Outcome: Progressing Towards Goal  Goal: *Fluid volume balance  Outcome: Progressing Towards Goal  Goal: *Labs within defined limits  Outcome: Progressing Towards Goal     Problem: Patient Education: Go to Patient Education Activity  Goal: Patient/Family Education  Outcome: Progressing Towards Goal     Problem: General Medical Care Plan  Goal: *Vital signs within specified parameters  Outcome: Progressing Towards Goal  Goal: *Labs within defined limits  Outcome: Progressing Towards Goal  Goal: *Absence of infection signs and symptoms  Outcome: Progressing Towards Goal  Goal: *Optimal pain control at patient's stated goal  Outcome: Progressing Towards Goal  Goal: *Skin integrity maintained  Outcome: Progressing Towards Goal  Goal: *Fluid volume balance  Outcome: Progressing Towards Goal  Goal: *Optimize nutritional status  Outcome: Progressing Towards Goal  Goal: *Anxiety reduced or absent  Outcome: Progressing Towards Goal  Goal: *Progressive mobility and function (eg: ADL's)  Outcome: Progressing Towards Goal     Problem: Patient Education: Go to Patient Education Activity  Goal: Patient/Family Education  Outcome: Progressing Towards Goal     Problem:  Body Temperature -  Risk of, Imbalanced  Goal: *Absence of cold stress or hypothermia signs and symptoms  Outcome: Progressing Towards Goal     Problem: Patient Education: Go to Patient Education Activity  Goal: Patient/Family Education  Outcome: Progressing Towards Goal

## 2022-08-31 NOTE — PROGRESS NOTES
Deferred visit:  Referral received and acknowledged. The patient is unavailable with low BP. Will follow and eval when appropriate.

## 2022-08-31 NOTE — CONSULTS
Comprehensive Nutrition Assessment    Type and Reason for Visit: Initial, Consult    Nutrition Recommendations/Plan:     Recommend ordering MVI, thiamine, folic acid    TF REC's:              Jevity 1.5 at goal rate of 60 ml/hr + 130 ml h20 flush q4h         Malnutrition Assessment:  Malnutrition Status:  Severe malnutrition (08/31/22 1425)    Context:  Chronic illness     Findings of the 6 clinical characteristics of malnutrition:   Energy Intake:  75% or less est energy requirements for 1 month or longer  Weight Loss:  Unable to assess     Body Fat Loss:  Severe body fat loss, Triceps, Orbital, Buccal region   Muscle Mass Loss:  Severe muscle mass loss, Temples (temporalis), Clavicles (pectoralis &deltoids), Hand (interosseous)  Fluid Accumulation:  Severe, Ascites   Strength:  Not performed        Nutrition Assessment:    64 yo female admitted for ETOH cirrhosis with ascites. Pmhx: HTN, ETOH cirrhosis. Nephrology following for high BUN/Cr. MD consult received for TF rec's. Hepatologist notes severe malnutrition and inability to meet her needs with PO intakes. Spoke with pt at bedside. She is jaundiced and intermittently confused. Severe muscle and fat wasting present. States she usually eats 2x/day at home. Provided a UBW of 177 lbs but could not tell me when she weighed that. Based on her current weight of 112 lbs, this would indicate a 36% loss. Pt with large volume ascites. Noted to have received paracentesis PTA but amount not documented. TF Rec's: Jevity 1.5 at goal rate of 60 ml/hr + 130 ml h20 flush q4h to provide 1320 ml, 1980 kcals (100% kcal needs), 84 gm protein (91% protein needs), 1003 + 780 = 1783 ml water. Currently NPO. Will monitor for diet order and adjust TF as needed (possible change to bolus or nocturnal). Labs reviewed.       Nutritionally Significant Medications:  Probiotic, Vit K; NaCl at 50 ml/hr      Estimated Daily Nutrient Needs:  Energy Requirements Based On: Kcal/kg  Weight Used for Energy Requirements: Current  Energy (kcal/day): 7370-3645 (35-40 kcals/kg)  Weight Used for Protein Requirements: Current  Protein (g/day):  (1.8-2 gm/kg)  Method Used for Fluid Requirements: 1 ml/kcal  Fluid (ml/day): 1800    Nutrition Related Findings:   Edema: ascites  Last BM: 08/29/22, Loose    Wounds: None      Current Nutrition Therapies:  Diet: NPO  Supplements: none  Meal intake: No data found. Supplement intake: No data found. Nutrition Support: none      Anthropometric Measures:  Height: 5' 3\" (160 cm)  Ideal Body Weight (IBW): 115 lbs (52 kg)     Current Body Wt:  51.3 kg (113 lb 1.5 oz), 98.3 % IBW. Bed scale  Current BMI (kg/m2): 20        Weight Adjustment: No adjustment                 BMI Category: Normal weight (BMI 18.5-24. 9)    Wt Readings from Last 10 Encounters:   08/31/22 51.3 kg (113 lb 1.6 oz)           Nutrition Diagnosis:   Severe malnutrition related to inadequate protein-energy intake as evidenced by severe muscle loss, severe loss of subcutaneous fat, poor intake prior to admission    Nutrition Interventions:   Food and/or Nutrient Delivery: Start oral diet, Start tube feeding  Nutrition Education/Counseling: No recommendations at this time  Coordination of Nutrition Care: Continue to monitor while inpatient       Goals:     Goals: other (specify)  Specify Other Goals: Meet > 90% EEN's with EN + PO over next 5-7 days    Nutrition Monitoring and Evaluation:   Behavioral-Environmental Outcomes: Readiness for change  Food/Nutrient Intake Outcomes: Diet advancement/tolerance, Food and nutrient intake, Enteral nutrition intake/tolerance  Physical Signs/Symptoms Outcomes: Biochemical data, GI status, Weight    Discharge Planning:     Too soon to determine    Mark Cordero RD  Available via [employer]

## 2022-08-31 NOTE — PROGRESS NOTES
Occupational Therapy   [phone]    Orders acknowledged and chart reviewed. RN reporting they are in process of getting access at this time and patient not available. Also of note, patient BP currently in 100s/30s. Will defer and follow-up as able and medically appropriate. Thank you. Fallon Vargas MS, OTR/L

## 2022-08-31 NOTE — PROGRESS NOTES
1924: TRANSFER - IN REPORT:    Verbal report received from [place], [facility] (name) on Mickey Muniz  being received from ED (unit) for routine progression of care      Report consisted of patients Situation, Background, Assessment and   Recommendations(SBAR). IInformation from the following report(s) SBAR, Kardex, MAR, Recent Results, and Cardiac Rhythm NSR  was reviewed with the receiving nurse. Opportunity for questions and clarification was provided. Assessment completed upon patients arrival to unit and care assumed. 1930: Bedside and Verbal shift change report given to Nieves Florentino RN (oncoming nurse) by Joon Wilson RN (offgoing nurse). Report included the following information SBAR, Kardex, MAR, and Cardiac Rhythm NSR .

## 2022-09-01 ENCOUNTER — APPOINTMENT (OUTPATIENT)
Dept: GENERAL RADIOLOGY | Age: 62
DRG: 280 | End: 2022-09-01
Attending: INTERNAL MEDICINE
Payer: MEDICAID

## 2022-09-01 LAB
ALBUMIN SERPL-MCNC: 3.2 G/DL (ref 3.5–5)
ANION GAP SERPL CALC-SCNC: 15 MMOL/L (ref 5–15)
BUN SERPL-MCNC: 70 MG/DL (ref 6–20)
BUN/CREAT SERPL: 15 (ref 12–20)
CALCIUM SERPL-MCNC: 8 MG/DL (ref 8.5–10.1)
CHLORIDE SERPL-SCNC: 104 MMOL/L (ref 97–108)
CO2 SERPL-SCNC: 14 MMOL/L (ref 21–32)
CREAT SERPL-MCNC: 4.63 MG/DL (ref 0.55–1.02)
FERRITIN SERPL-MCNC: 443 NG/ML (ref 8–252)
GLUCOSE SERPL-MCNC: 64 MG/DL (ref 65–100)
IRON SATN MFR SERPL: 82 % (ref 20–50)
IRON SERPL-MCNC: 47 UG/DL (ref 35–150)
PHOSPHATE SERPL-MCNC: 3.8 MG/DL (ref 2.6–4.7)
PHOSPHOLIPID P SER-MCNC: 144 MG/DL (ref 151–288)
POTASSIUM SERPL-SCNC: 3.3 MMOL/L (ref 3.5–5.1)
SODIUM SERPL-SCNC: 133 MMOL/L (ref 136–145)
TIBC SERPL-MCNC: 57 UG/DL (ref 250–450)

## 2022-09-01 PROCEDURE — 97165 OT EVAL LOW COMPLEX 30 MIN: CPT

## 2022-09-01 PROCEDURE — 99233 SBSQ HOSP IP/OBS HIGH 50: CPT | Performed by: INTERNAL MEDICINE

## 2022-09-01 PROCEDURE — 0DH67UZ INSERTION OF FEEDING DEVICE INTO STOMACH, VIA NATURAL OR ARTIFICIAL OPENING: ICD-10-PCS | Performed by: INTERNAL MEDICINE

## 2022-09-01 PROCEDURE — 3E0G76Z INTRODUCTION OF NUTRITIONAL SUBSTANCE INTO UPPER GI, VIA NATURAL OR ARTIFICIAL OPENING: ICD-10-PCS | Performed by: INTERNAL MEDICINE

## 2022-09-01 PROCEDURE — 97530 THERAPEUTIC ACTIVITIES: CPT

## 2022-09-01 PROCEDURE — 80069 RENAL FUNCTION PANEL: CPT

## 2022-09-01 PROCEDURE — 74011250636 HC RX REV CODE- 250/636: Performed by: INTERNAL MEDICINE

## 2022-09-01 PROCEDURE — 94760 N-INVAS EAR/PLS OXIMETRY 1: CPT

## 2022-09-01 PROCEDURE — P9047 ALBUMIN (HUMAN), 25%, 50ML: HCPCS | Performed by: INTERNAL MEDICINE

## 2022-09-01 PROCEDURE — 74011000250 HC RX REV CODE- 250: Performed by: INTERNAL MEDICINE

## 2022-09-01 PROCEDURE — 74011250637 HC RX REV CODE- 250/637: Performed by: INTERNAL MEDICINE

## 2022-09-01 PROCEDURE — 74011000258 HC RX REV CODE- 258: Performed by: INTERNAL MEDICINE

## 2022-09-01 PROCEDURE — 83540 ASSAY OF IRON: CPT

## 2022-09-01 PROCEDURE — 97535 SELF CARE MNGMENT TRAINING: CPT

## 2022-09-01 PROCEDURE — 97116 GAIT TRAINING THERAPY: CPT

## 2022-09-01 PROCEDURE — 82728 ASSAY OF FERRITIN: CPT

## 2022-09-01 PROCEDURE — 74340 X-RAY GUIDE FOR GI TUBE: CPT

## 2022-09-01 PROCEDURE — 36415 COLL VENOUS BLD VENIPUNCTURE: CPT

## 2022-09-01 PROCEDURE — 74011000636 HC RX REV CODE- 636: Performed by: HOSPITALIST

## 2022-09-01 PROCEDURE — 97161 PT EVAL LOW COMPLEX 20 MIN: CPT

## 2022-09-01 PROCEDURE — 65660000001 HC RM ICU INTERMED STEPDOWN

## 2022-09-01 PROCEDURE — C9113 INJ PANTOPRAZOLE SODIUM, VIA: HCPCS | Performed by: INTERNAL MEDICINE

## 2022-09-01 RX ORDER — TRIAMCINOLONE ACETONIDE 40 MG/ML
40 INJECTION, SUSPENSION INTRA-ARTICULAR; INTRAMUSCULAR
Status: ACTIVE | OUTPATIENT
Start: 2022-09-01 | End: 2022-09-02

## 2022-09-01 RX ORDER — LIDOCAINE HYDROCHLORIDE 20 MG/ML
15 SOLUTION OROPHARYNGEAL
Status: ACTIVE | OUTPATIENT
Start: 2022-09-01 | End: 2022-09-01

## 2022-09-01 RX ADMIN — MIDODRINE HYDROCHLORIDE 10 MG: 5 TABLET ORAL at 06:01

## 2022-09-01 RX ADMIN — SODIUM CHLORIDE, PRESERVATIVE FREE 10 ML: 5 INJECTION INTRAVENOUS at 06:59

## 2022-09-01 RX ADMIN — HEPARIN SODIUM 5000 UNITS: 5000 INJECTION INTRAVENOUS; SUBCUTANEOUS at 10:28

## 2022-09-01 RX ADMIN — ALBUMIN (HUMAN) 25 G: 0.25 INJECTION, SOLUTION INTRAVENOUS at 06:01

## 2022-09-01 RX ADMIN — PANTOPRAZOLE SODIUM 40 MG: 40 INJECTION, POWDER, FOR SOLUTION INTRAVENOUS at 06:58

## 2022-09-01 RX ADMIN — POTASSIUM BICARBONATE 40 MEQ: 782 TABLET, EFFERVESCENT ORAL at 16:48

## 2022-09-01 RX ADMIN — Medication 1 CAPSULE: at 10:27

## 2022-09-01 RX ADMIN — BUPROPION HYDROCHLORIDE 150 MG: 150 TABLET, EXTENDED RELEASE ORAL at 10:28

## 2022-09-01 RX ADMIN — IOPAMIDOL 30 ML: 612 INJECTION, SOLUTION INTRAVENOUS at 12:00

## 2022-09-01 RX ADMIN — SODIUM CHLORIDE, PRESERVATIVE FREE 10 ML: 5 INJECTION INTRAVENOUS at 21:57

## 2022-09-01 RX ADMIN — SODIUM CHLORIDE, PRESERVATIVE FREE 2 G: 5 INJECTION INTRAVENOUS at 06:01

## 2022-09-01 RX ADMIN — SODIUM BICARBONATE: 84 INJECTION, SOLUTION INTRAVENOUS at 16:54

## 2022-09-01 RX ADMIN — ARIPIPRAZOLE 2 MG: 2 TABLET ORAL at 10:28

## 2022-09-01 RX ADMIN — MIDODRINE HYDROCHLORIDE 10 MG: 5 TABLET ORAL at 21:56

## 2022-09-01 RX ADMIN — SODIUM CHLORIDE, PRESERVATIVE FREE 5 ML: 5 INJECTION INTRAVENOUS at 14:00

## 2022-09-01 RX ADMIN — MIDODRINE HYDROCHLORIDE 10 MG: 5 TABLET ORAL at 16:48

## 2022-09-01 RX ADMIN — HEPARIN SODIUM 5000 UNITS: 5000 INJECTION INTRAVENOUS; SUBCUTANEOUS at 16:48

## 2022-09-01 RX ADMIN — PHYTONADIONE 10 MG: 10 INJECTION, EMULSION INTRAMUSCULAR; INTRAVENOUS; SUBCUTANEOUS at 10:28

## 2022-09-01 RX ADMIN — SODIUM CHLORIDE 50 ML/HR: 900 INJECTION, SOLUTION INTRAVENOUS at 07:02

## 2022-09-01 RX ADMIN — ALBUMIN (HUMAN) 25 G: 0.25 INJECTION, SOLUTION INTRAVENOUS at 23:38

## 2022-09-01 RX ADMIN — HEPARIN SODIUM 5000 UNITS: 5000 INJECTION INTRAVENOUS; SUBCUTANEOUS at 23:38

## 2022-09-01 RX ADMIN — ALBUMIN (HUMAN) 25 G: 0.25 INJECTION, SOLUTION INTRAVENOUS at 18:34

## 2022-09-01 RX ADMIN — ALBUMIN (HUMAN) 25 G: 0.25 INJECTION, SOLUTION INTRAVENOUS at 11:42

## 2022-09-01 NOTE — PROGRESS NOTES
Transition of Care Plan  RUR 17%    Transfer from [facility]    Paracentesis catheter placed and draining ascites      Disposition   TBD pending medical and therapy progress and recommendations  Patient has Medicaid--no SNF benefit--would have to be LTC  UAI would need to be completed. IPR would be an option if patient meets criteria    Transportation   pending medical and therapy progress and recommendations    Home Health  Will arrange if patient return home and is ordered  --patient lives in [facility] and [address] [facility]   [place]    Medical follow up  PCP and specialist    Contact  Sister Rene Jones  [phone]      Reason for Admission:   Alcoholic cirrhosis of liver with ascites  Hypertension, alcoholic liver cirrhosis                  RUR Score:     17%             PCP: First and Last name:   Joanie Seip, MD     Name of Practice: Family Practice   Are you a current patient: Yes/No: yes   Approximate date of last visit:  its been a long time   Can you participate in a virtual visit if needed:     Do you (patient/family) have any concerns for transition/discharge? Safe discharge               Plan for utilizing home health:   will arrange if ordered and patient agrees       Current Advanced Directive/Advance Care Plan:  Full Code      Healthcare Decision Maker:   Click here to complete [address] including selection of the Healthcare Decision Maker Relationship (ie \"Primary\")              Transition of Care Plan:       TBD determined--pending medical and therapy progress and recommendations    Rehab/ LTC/ home with [facility] called patient in her room and she did not want to talk with CM at this time as she is very tired. . She gave CM permission to call her sister, Rene Jones    CM talked with Julian Lucero, who now lives in [address] [place] [address], and she confirmed patient's address, PCP (does not go very often) and [place]- [place].   Sister does not feel patient  medically takes good care of herself. She did not know patient was in the hospital.     Sister stated that patient lives in a one level apartment alone,  but there are steps to enter the apartment. She has a couple of pets. She said patient has a difficult time with the steps-- she has a RW, rollator, cane and shower seat . Sister states that patient drives and was able to prepare her meals and daily activities. She has no children-- no family  in the area. Sister and patient's mother who lives in a [place], reside in [place], [place]. Sister said she would like to coordinate coming to [place] to see her sister. .. Patient has a few friends that provide assistance and are supportive    Therapy is recommending SNF- Patient has no SNF benefit-- CM will talk with therapy to ask about IPR placement -- For SNF placement-- patient would have to go to LTC-- and UAI completed. Patient has had  paracentesis/ drain in place. Hepatology to  start tube feeds. ( Severe protein-calorie malnutrition--muscle wasting  )  IR to place feeding tube    CM to follow patient's medical and therapy progress and make referrals for safe discharge when appropriate. [facility] and she is going to call patient's nurse. Care Management Interventions  PCP Verified by CM: Yes (has not seen physician in a long time)  Mode of Transport at Discharge:  Other (see comment) (TBD  Medicaid--BLS/ Wheelchair van/ taxi pending progress)  Discharge Durable Medical Equipment: No  Physical Therapy Consult: Yes  Occupational Therapy Consult: Yes  Speech Therapy Consult: No  Support Systems: Other Family Member(s), Friend/Neighbor  Confirm Follow Up Transport: Other (see comment) (TBD-- patient was driving prior to admission)  Discharge Location  Patient Expects to be Discharged to[de-identified]  (TBD-- pending medical and therapy progress and recommendations -- rehab/ LTC/ home with [place])

## 2022-09-01 NOTE — PROGRESS NOTES
[facility], MD, FACP, Ayesha Estelle Fernandez, [place]      EDITH Jeff, [facility]     Argentina Sparks, [facility]   ERIC Loera, [facility]       Eleonora Edgar Arley De Srinivasan 136    at [address], [facility] Remington Watson  22.    [phone]    FAX: [address] [place] [address], [address], [address]    [phone]    FAX: [phone]       HEPATOLOGY PROGRESS NOTE  The patient is a 65 y.o.  female with a multi-decade history of consuming 1 bottle of wine per night. She was told by per medical provider in the past to cut back on alcohol use. She developed jaundice and scleral icterus 12 days ago. She has developed weakness and is unable to walk. She lives alone and has no family in the area. This is her first ever hospitalization for alcohol related illness. In the ED Laboratory studies were significant for:    WBC 29.9, HB 9.8 gms, , Sna 129, Scr 4.3 mg, AST 91, ALT 35, , TBILI 29.7 mg, INR 1.8, Sammonia 11,     Imaging of the liver with CT scan demonstrated cirrhosis with a large volume of ascites. Hospital Course:  Paracentesis catheter placed and is draining ascites. Scr has not come down first 24 hours with IV albumin  U/O has picked up today compared to yesterday  No steroids because of infection risk. She is too muscle wasted and high WBC    Hepatology Plan:  I have spoken to her regarding her condition and mortality risk. We discussed liver trnasplant and I explained that she was not a candidate at this time because of severe malnutrition. Only way for her to survive is via enteral nutrition. She is in agreement to have feeding tube placed and receive tube feeds.   Will ask IR if they can place feeding tube so tip is into duodenum. This is important to reduce risk aspiration. I do not think she needs EGD to screen for varices first.  Need for enteral nutrition far outweighs risk that tube would cause variceal bleeding. She is at high risk for sedation to perform EGD. ASSESSMENT AND PLAN:  Cirrhosis  The diagnosis of cirrhosis is based upon imaging, laboratory studies, complications of cirrhosis. Cirrhosis is secondary to alcohol. The CTP is 10. Child class C. The MELD score is 39. Based upon the MELD and CTP scores the patient has a mortality of about 50-70% within the next 90 days   She is not a candidate for LT at this time because of severe malnutrition and advanced muscle wasting. Alcohol liver disease  Suspect the patient has alcohol induced liver disease based upon a history of consuming significant alcohol on a daily basis for many years, pattern of AST>ALT,   Serologies for other causes of chronic liver disease need to be obtained at some point    Ascites   Ascites developed for the first time in 8/2022. Will need large volume paracentesis   IV albumin at usual dose    Screening for Esophageal varices   The patient has not had an EGD to screen for varices. Will perform EGD at some point during the hospitalization. Not bleeding. Not top priority. Risk of sedation given severe malnutrition and muscle wasting is considerable. Hepatic encephalopathy   Overt HE has not developed to date. There is no reason for treatment with lactulose of xifaxan  There is no rason to give her diarrhea. There is no need to restrict dietary protein at this time. ch will place the patient and others at risk for injury. Coagulopathy  This is secondary to cirrhosis, malnutrition,   Will treat with 3 doses of Vitamin K over 3 days. There is no evidence of bleeding. No need for FFP at this time.     Malnutrition/muscle wasting  The patient has severe protein-calorie malnutrition with severe muscle wasting of the upper extremities, lower extremities, facial muscles  Malnutrition and muscle wasting is due to Alcohol abuse and poor caloric intake, cirrhosis and poor caloric utilization, refractory ascites and inability to eat sufficient calories,     The patient needs tube feedings. It is impossible for her to get any significant amount of calories by eating   Consult nutrition service for formula and to guide volume after feeding tube placed. PHYSICAL EXAMINATION:  VS: per nursing note  General:  Ill appearing  Eyes:  Sclera deeply icteric  ENT:  No oral lesions. Thyroid normal.  Nodes:  No adenopathy. Skin:  Numerous spider angiomata. Jaundice. Respiratory:  Lungs clear to auscultation. Cardiovascular:  Regular heart rate. Abdomen:  Distended with obvious ascites. Extremities:  No lower extremity edema. Severe muscle wasting. Neurologic:  Alert and oriented. Cranial nerves grossly intact. No asterixis. LABORATORY:   Latest Reference Range & Units 8/30/22 01:52 8/30/22 11:18 8/31/22 04:48   WBC 3.6 - 11.0 K/uL 29.9 (H)  16.7 (H)   HGB 11.5 - 16.0 g/dL 9.8 (L)  7.8 (L)   PLATELET 781 - 188 K/uL 330  222   INR 0.9 - 1.1   1.8 (H)  2.1 (H)   Sodium 136 - 145 mmol/L 129 (L) 132 (L) 131 (L)   Potassium 3.5 - 5.1 mmol/L 3.8 3.5 3.6   Chloride 97 - 108 mmol/L 96 (L) 102 101   CO2 21 - 32 mmol/L 19 (L) 19 (L) 16 (L)   Glucose 65 - 100 mg/dL 122 (H) 95 74   BUN 6 - 20 MG/DL 66 (H) 66 (H) 69 (H)   Creatinine 0.55 - 1.02 MG/DL 4.35 (H) 4.21 (H) 4.45 (H)   Bilirubin, total 0.2 - 1.0 MG/DL 29.7 (H)  27.4 (H)   Albumin 3.5 - 5.0 g/dL 1.8 (L)  2.6 (L)   ALT 12 - 78 U/L 35  25   AST 15 - 37 U/L 91 (H)  70 (H)   Alk.  phosphatase 45 - 117 U/L 230 (H)  163 (H)   Ammonia, plasma <32 UMOL/L 11     (H): Data is abnormally high  (L): Data is abnormally low      Alberto Horan MD  [facility] [address] [place] [address] [place]

## 2022-09-01 NOTE — PROGRESS NOTES
Renal Progress Note    NAME:  Ki Ahuja   :   1960   MRN:   [id number]     Date/Time:  2022 12:09 PM  Subjective:       , feels ok, no N/v/SOB.  Worry about her pets   seen and examined, has no complaint, not oriented to place or time, had ascitis drain placed by IR, 3 lit drained sofar, 400 ml uo        Medications reviewed:  Current Facility-Administered Medications   Medication Dose Route Frequency    lidocaine (XYLOCAINE) 2 % viscous solution 15 mL  15 mL Mouth/Throat RAD ONCE    triamcinolone acetonide (KENALOG-40) 40 mg/mL injection 40 mg  40 mg Intra artICUlar RAD ONCE    sodium bicarbonate (8.4%) 150 mEq in sterile water 1,000 mL infusion   IntraVENous CONTINUOUS    albumin human 25% (BUMINATE) solution 25 g  25 g IntraVENous Q6H    phytonadione (vitamin K1) (AQUA-MEPHYTON) 10 mg in 0.9% sodium chloride 50 mL IVPB  10 mg IntraVENous DAILY    midodrine (PROAMATINE) tablet 10 mg  10 mg Oral Q8H    oxyCODONE IR (ROXICODONE) tablet 5 mg  5 mg Oral Q4H PRN    cefTRIAXone (ROCEPHIN) 2 g in 0.9% sodium chloride 20 mL IV syringe  2 g IntraVENous Q24H    ARIPiprazole (ABILIFY) tablet 2 mg  2 mg Oral DAILY    buPROPion SR (WELLBUTRIN SR) tablet 150 mg  150 mg Oral DAILY    LORazepam (ATIVAN) tablet 1 mg  1 mg Oral BID PRN    pantoprazole (PROTONIX) 40 mg in 0.9% sodium chloride 10 mL injection  40 mg IntraVENous ACB    sodium chloride (NS) flush 5-40 mL  5-40 mL IntraVENous Q8H    sodium chloride (NS) flush 5-40 mL  5-40 mL IntraVENous PRN    acetaminophen (TYLENOL) tablet 650 mg  650 mg Oral Q6H PRN    Or    acetaminophen (TYLENOL) suppository 650 mg  650 mg Rectal Q6H PRN    polyethylene glycol (MIRALAX) packet 17 g  17 g Oral DAILY PRN    ondansetron (ZOFRAN ODT) tablet 4 mg  4 mg Oral Q8H PRN    Or    ondansetron (ZOFRAN) injection 4 mg  4 mg IntraVENous Q6H PRN    heparin (porcine) injection 5,000 Units  5,000 Units SubCUTAneous Q8H    L.acidophilus-paracasei-S.thermophil-bifidobacter (RISAQUAD) 8 billion cell capsule  1 Capsule Oral DAILY    nicotine (NICODERM CQ) 21 mg/24 hr patch 1 Patch  1 Patch TransDERmal DAILY        Objective:   Vitals:  Visit Vitals  BP (!) 91/35 (BP 1 Location: Left upper arm, BP Patient Position: Reclining;Sitting)   Pulse 81   Temp 98.3 °F (36.8 °C)   Resp 16   Ht 5' 3\" (1.6 m)   Wt 50.1 kg (110 lb 8 oz)   SpO2 100%   BMI 19.57 kg/m²     Temp (24hrs), Av.9 °F (36.6 °C), Min:96.6 °F (35.9 °C), Max:98.5 °F (36.9 °C)      O2 Device: None (Room air)    Last 24hr Input/Output:    Intake/Output Summary (Last 24 hours) at 2022 1508  Last data filed at 2022 0710  Gross per 24 hour   Intake 2208.34 ml   Output 3275 ml   Net -1066.66 ml          Physical Exam:  General: Jaundiced, awake and alert   HEENT:  sclerae icteric  Neck: Supple  Lungs : Clears to auscultation Bilaterally, Normal respiratory effort  CVS: RRR, S1 S2 normal, No rub, no  LE edema  Abdomen: Soft, Non tender, distended,  Ascitis+, paredes, abdomianl drain+  Extremities: No edema  Skin: jaundice  Neurologic: awake and alert, nl speech, orientedx1    Lab Data Reviewed:    Recent Results (from the past 24 hour(s))   CULTURE, BODY FLUID W GRAM STAIN    Collection Time: 22  3:35 PM    Specimen: Abdominal Fluid; Body Fluid   Result Value Ref Range    Special Requests: NO SPECIAL REQUESTS      GRAM STAIN RARE WBCS SEEN      GRAM STAIN NO ORGANISMS SEEN      Culture result: NO GROWTH THUS FAR     GLUCOSE, FLUID    Collection Time: 22  3:35 PM   Result Value Ref Range    Fluid Type: ABDOMINAL FLUID      Glucose, body fld. 80 MG/DL   LDH, BODY FLUID    Collection Time: 22  3:35 PM   Result Value Ref Range    Fluid Type: ABDOMINAL FLUID      LD, body fld.  28 U/L   FERRITIN    Collection Time: 22  3:52 AM   Result Value Ref Range    Ferritin 443 (H) 8 - 252 NG/ML   IRON PROFILE    Collection Time: 22  3:52 AM   Result Value Ref Range    Iron 47 35 - 150 ug/dL    TIBC 57 (L) 250 - 450 ug/dL    Iron % saturation 82 (H) 20 - 50 %   RENAL FUNCTION PANEL    Collection Time: 09/01/22  3:52 AM   Result Value Ref Range    Sodium 133 (L) 136 - 145 mmol/L    Potassium 3.3 (L) 3.5 - 5.1 mmol/L    Chloride 104 97 - 108 mmol/L    CO2 14 (LL) 21 - 32 mmol/L    Anion gap 15 5 - 15 mmol/L    Glucose 64 (L) 65 - 100 mg/dL    BUN 70 (H) 6 - 20 MG/DL    Creatinine 4.63 (H) 0.55 - 1.02 MG/DL    BUN/Creatinine ratio 15 12 - 20      GFR est AA 12 (L) >60 ml/min/1.73m2    GFR est non-AA 10 (L) >60 ml/min/1.73m2    Calcium 8.0 (L) 8.5 - 10.1 MG/DL    Phosphorus 3.8 2.6 - 4.7 MG/DL    Albumin 3.2 (L) 3.5 - 5.0 g/dL         Assessment/Plan:   Principal Problem:    Alcoholic cirrhosis of liver with ascites (HCC) (8/30/2022)    Active Problems:    Severe protein-calorie malnutrition ([facility] [address] (8/31/2022)     Acute kidney injury -cr 0.4 in early August,   -RANDY/possible ATN due to intravascular volume depletion in setting of poor po intake, hypotension /third spacing and possible sepsis/NSAIDs. Hepatorenal in DDx but less likely. Low FeNa-low urine output  -no improvement in renal function with IVF/albumin, UO still low. Bicarb lower. -c/w with IV albumin  -increased midodrine 10 mg bid  -c/w IVF, agree with changing to bicarb IVF-has mixed resp alkalosis and met acidosis  -strict Is and Os  -avoid nephrotoxins, IV contrast/NSAIDs  -dose meds per GFR  -no need for RRT at this time-poor candidate for HD  -f/up  renal function     Hypotension-with h/o HTN on Coreg, due to effective intravascular volume depletion  -on prednisone per med list, but patient denies taking that. Although she is not fully oriented.   -midodrine     Hyponatremia, due to liver cirrhosis and volume depletion, chronic since early August  -improving with IVF  -Avoid rapid correction  -improving    Hypokalemia   -replete prn, check Mg    Alcoholic liver cirrhosis-decompensated.    -Hypoalbuminemia/ascitis/coagulopathy-nl ammonia  -hepatology following    Anemia  -high iron stores  -Hb has dropped, might be due to hydration, r/o bleed    On Rocephin  Plan for enteral feeding noted.   ___________________________________________________    Total time spent with patient:  [] 15   [] 25   [] 35   []  __ minutes    [] Critical Care Provided    Care Plan discussed with:    [x] Patient   [] Family    [] Care Manager   [] Consultant/Specialist :      []   >50% of visit spent in counseling and coordination of care   (Discussed [] CODE status,  [] Care Plan, [] D/C Planning)    ___________________________________________________    Attending Physician: MD Randall Maciel

## 2022-09-01 NOTE — PROGRESS NOTES
Problem: Self Care Deficits Care Plan (Adult)  Goal: *Acute Goals and Plan of Care (Insert Text)  Description: FUNCTIONAL STATUS PRIOR TO ADMISSION: At time of evaluation pt is a questionable historian. Patient was modified independent using a walker and single point cane for functional mobility. HOME SUPPORT: The patient lived alone with her friend to provide assistance as needed. Occupational Therapy Goals  Initiated 9/1/2022  1. Patient will perform standing grooming routine with supervision/set-up within 7 day(s). 2.  Patient will perform seated upper and lower body bathing with supervision/set-up within 7 day(s). 3.  Patient will perform upper and lower body dressing with supervision/set-up within 7 day(s). 4.  Patient will perform toilet transfers with supervision/set-up within 7 day(s). 5.  Patient will perform all aspects of toileting with supervision/set-up within 7 day(s). 6.  Patient will participate in upper extremity therapeutic exercise/activities with supervision/set-up for 5 minutes within 7 day(s). Outcome: Not Met   OCCUPATIONAL THERAPY EVALUATION  Patient: Marsha Brooks (7 y.o. female)  Date: 9/1/2022  Primary Diagnosis: Alcoholic [facility] liver with [facility] [K70.31]       Precautions:   Fall    ASSESSMENT  Based on the objective data described below, the patient presents with impaired balance, activity tolerance, generalized weakness, impaired cognition (processing speed, attention to task, orientation) s/p admission for abdominal pain and now being treated for mgt of alcoholic cirrhosis. Pt received supine and agreeable to participation in therapy session. She demonstrated significantly delayed processing speed impacting command following, conversation, and performance of functional transfers/ADL tasks. Up to max A x2 required for completion of bed mobility, however min A x2 required for sit<>stand trials at EOB and bedside chair.  Seated LB dressing completed with max A, extended time, and multimodal cues. At this time recommend d/c to rehab as pt is well below her reported functional baseline of independent (pt questionable historian at time of evaluation). Current Level of Function Impacting Discharge (ADLs/self-care): max A seated LB ADLs, max A bed mobility and min A x2 sit<>stand with RW     Functional Outcome Measure: The patient scored Total: 20/100 on the Barthel Index outcome measure which is indicative of being totally dependent in basic self-care. Other factors to consider for discharge: PLOF, questionable historian, limited social support      Patient will benefit from skilled therapy intervention to address the above noted impairments. PLAN :  Recommendations and Planned Interventions: self care training, functional mobility training, therapeutic exercise, balance training, therapeutic activities, endurance activities, patient education, and home safety training    Frequency/Duration: Patient will be followed by occupational therapy 5 times a week to address goals. Recommendation for discharge: (in order for the patient to meet his/her long term goals)  Therapy up to 5 days/week in SNF setting    This discharge recommendation:  Has been made in collaboration with the attending provider and/or case management    IF patient discharges home will need the following DME: TBD       SUBJECTIVE:   Patient stated That's a leg not an eye.  (when instructing pt to raise knee)    OBJECTIVE DATA SUMMARY:   HISTORY:   Past Medical History:   Diagnosis Date    Liver disease      Past Surgical History:   Procedure Laterality Date    HX ORTHOPAEDIC Right     hip replacement       Expanded or extensive additional review of patient history:     Home Situation  Home Environment: Apartment  # Steps to Enter: 3  Rails to Enter: Yes  Hand Rails : Bilateral  One/Two Story Residence: One story  Living Alone: Yes  Support Systems: Friend/Neighbor  Patient Expects to be Discharged to[de-identified] Skilled nursing facility  Current DME Used/Available at Home: Kalvin Leventhal, jenna, Karissa Harrison, Daniel Salvador, Karissa Harrison, rollator, Shower chair (patient states she uses rollator within home, [facility] when out in community)  Tub or Shower Type: Tub/Shower combination      EXAMINATION OF PERFORMANCE DEFICITS:  Cognitive/Behavioral Status:  Neurologic State: Confused; Eyes open spontaneously; Other (Comment) (Pt has been told she is at [facility] multiple times, still believes she is in [facility])  Orientation Level: Disoriented to place;Oriented to person;Oriented to time  Cognition: Decreased attention/concentration; Follows commands  Perception: Appears intact  Perseveration: Perseverates during conversation  Safety/Judgement: Decreased insight into deficits; Decreased awareness of need for safety;Decreased awareness of need for assistance;Decreased awareness of environment    Skin: visually intact     Edema: none noted     Hearing: Auditory  Auditory Impairment: None    Vision/Perceptual:                           Acuity: Within Defined Limits;Able to read employee name badge without difficulty         Range of Motion:    AROM: Generally decreased, functional  PROM: Generally decreased, functional                      Strength:    Strength: Generally decreased, functional                Coordination:  Coordination: Generally decreased, functional            Tone & Sensation:    Tone: Normal  Sensation: Intact                      Balance:  Sitting: Impaired; Without support  Sitting - Static: Fair (occasional)  Sitting - Dynamic: Fair (occasional)  Standing: Impaired; With support  Standing - Static: Fair  Standing - Dynamic : Fair;Poor;Constant support    Functional Mobility and Transfers for ADLs:  Bed Mobility:  Rolling: Minimum assistance;Assist x1  Supine to Sit: Maximum assistance;Assist x2  Scooting:  Moderate assistance;Assist x1    Transfers:  Sit to Stand: Minimum assistance;Assist x2  Stand to Sit: Minimum assistance;Assist x1  Bed to Chair: Minimum assistance;Assist x1    ADL Assessment:  Feeding: Setup (inferred)    Oral Facial Hygiene/Grooming: Minimum assistance (inferred, standing)    Bathing: Maximum assistance (inferred, up to max A LB)    Type of Bath: Chlorhexidine (CHG)    Upper Body Dressing: Maximum assistance (inferred, seated)    Lower Body Dressing: Maximum assistance (seated)    Toileting: Maximum assistance (inferred, for hygiene)                ADL Intervention and task modifications:                 Type of Bath: Chlorhexidine (CHG)              Lower Body Dressing Assistance  Socks: Maximum assistance  Leg Crossed Method Used: Yes  Position Performed: Seated edge of bed  Cues: Tactile cues provided;Verbal cues provided;Visual cues provided;Physical assistance         Cognitive Retraining  Safety/Judgement: Decreased insight into deficits; Decreased awareness of need for safety;Decreased awareness of need for assistance;Decreased awareness of environment    Functional Measure:    Barthel Index:  Bathin  Bladder: 0  Bowels: 5  Groomin  Dressin  Feedin  Mobility: 0  Stairs: 0  Toilet Use: 5  Transfer (Bed to Chair and Back): 5  Total: 20/100      The Barthel ADL Index: Guidelines  1. The index should be used as a record of what a patient does, not as a record of what a patient could do. 2. The main aim is to establish degree of independence from any help, physical or verbal, however minor and for whatever reason. 3. The need for supervision renders the patient not independent. 4. A patient's performance should be established using the best available evidence. Asking the patient, friends/relatives and nurses are the usual sources, but direct observation and common sense are also important. However direct testing is not needed. 5. Usually the patient's performance over the preceding 24-48 hours is important, but occasionally longer periods will be relevant.   6. Middle categories imply that the patient supplies over 50 per cent of the effort. 7. Use of aids to be independent is allowed. Score Interpretation (from [facility] 83)    Independent   60-79 Minimally independent   40-59 Partially dependent   20-39 Very dependent   <20 Totally dependent     -Malia Alvarez., Barthel, D.W. (1965). Functional evaluation: the Barthel Index. [address] ([address] [place] [address] (1997). The Barthel activities of daily living index: self-reporting versus actual performance in the old (> or = 75 years). Journal of [address] [place] [address] 45(7), [facility], J.AISHA, Karan Gallagher., Mabel Felty. (1999). Measuring the change in disability after inpatient rehabilitation; comparison of the responsiveness of the Barthel Index and Functional Kaufman Measure. Journal of Neurology, Neurosurgery, and Psychiatry, 66(4), 913-914. Sandra Velazco, N.J.A, ELIECER Last, & Kush Mcdowell MBintaA. (2004) Assessment of post-stroke quality of life in cost-effectiveness studies: The usefulness of the Barthel Index and the EuroQoL-5D. Quality of Life Research, 15, 842-72     Occupational Therapy Evaluation Charge Determination   History Examination Decision-Making   LOW Complexity : Brief history review  LOW Complexity : 1-3 performance deficits relating to physical, cognitive , or psychosocial skils that result in activity limitations and / or participation restrictions  MEDIUM Complexity : Patient may present with comorbidities that affect occupational performnce.  Miniml to moderate modification of tasks or assistance (eg, physical or verbal ) with assesment(s) is necessary to enable patient to complete evaluation       Based on the above components, the patient evaluation is determined to be of the following complexity level: LOW   Pain Rating:  None reported     Activity Tolerance:   Poor    After treatment patient left in no apparent distress:    Sitting in chair, Call bell within reach, and Bed / chair alarm activated    COMMUNICATION/EDUCATION:   The patients plan of care was discussed with: Physical therapist and Registered nurse. Patient/family have participated as able in goal setting and plan of care. and Patient/family agree to work toward stated goals and plan of care. This patients plan of care is appropriate for delegation to [facility].     Thank you for this referral.  Angie Zimmer OT  Time Calculation: 37 mins

## 2022-09-01 NOTE — PROGRESS NOTES
[facility] Adult  Hospitalist Group                                                                                          Hospitalist Progress Note  Rosas Bain MD  Answering service: [phone] from in house phone        Date of Service:  2022  NAME:  Benjamin Baker  :  1960  MRN:  [id number]      Admission Summary:   Per H&P, This is a 26-year-old woman with a past medical history significant for hypertension, alcoholic liver cirrhosis, who presented at the emergency room with abdominal pain. The patient presented at [facility] Emergency Room. When the patient arrived at the emergency room, the patient was found to have decompensation of liver cirrhosis. The hospitalist service at [facility] was asked to directly admit the patient to [facility], so that the patient can be seen by the hepatologist.  The patient came to the emergency room at [facility], because of lack of bed on the on the medical floor. When the patient arrived at the emergency room, the patient's lab work shows significant abnormality. She was subsequently referred to the hospitalist service for admission. No record of prior admission to this hospital.  It was reported that the patient underwent abdominal paracentesis before coming to [facility].  The amount of fluid removed is not known. The patient's abdominal pain is diffuse in location, dull ache, constant, 7/10 in severity with no known aggravating or relieving factors associated with nausea, but no vomiting, no diarrhea, and no constipation. Interval history / Subjective:   Seen and examined, a bit drowsy, specifically denied pain, dyspnea, n/v. Has a bare hugger on.        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 Due to her disease process  Per hepatology will start tube feeds  Consulting with RD  TF advance to duodenum and resume TFs    Acute kidney injury  Suspected ATN, IVVD due to poor PO intake, hypotension, NSAID use. HRS on ddx. No improvement with IV albumin, IV NS and midodrine so far  Worsening metabolic acidosis - change IVNS to bicarbonate infusion    Hypotension  Continue midodrine, IV albumin and IV fluids as above    Anemia  Iron studies consistent with chronic disease  Transfuse PRN  Vitamin K per hepatology for coagulopathy    History of HTN    This patient is critically-ill and at risk for decline due to: decompensated cirrhosis, renal failure and hypotension. CCT 35 minutes. Code status: FULL  Prophylaxis: SCD  Care Plan discussed with: pt RN [facility] Problems  Date Reviewed: 8/30/2022            Codes Class Noted POA    Severe protein-calorie malnutrition ([facility] (Chronic) ICD-10-CM: C93  ICD-9-CM: 537  8/31/2022 Yes        * (Principal) Alcoholic cirrhosis of liver with ascites ([facility] ICD-10-CM: K70.31  ICD-9-CM: 571.2  8/30/2022 Yes         Review of Systems:   A comprehensive review of systems was negative except for that written in the HPI. Vital Signs:    Last 24hrs VS reviewed since prior progress note.  Most recent are:  Visit Vitals  BP (!) 91/35 (BP 1 Location: Left upper arm, BP Patient Position: Reclining;Sitting)   Pulse 81   Temp 98.3 °F (36.8 °C)   Resp 16   Ht 5' 3\" (1.6 m)   Wt 50.1 kg (110 lb 8 oz)   SpO2 100%   BMI 19.57 kg/m²         Intake/Output Summary (Last 24 hours) at 9/1/2022 1551  Last data filed at 9/1/2022 0710  Gross per 24 hour   Intake 2208.34 ml   Output 3275 ml   Net -1066.66 ml          Physical Examination:     I had a face to face encounter with this patient and independently examined them on 9/1/2022 as outlined below:          Constitutional:  NAD, cachectic, chronically-ill appearing   ENT:  Oral mucosa moist, oropharynx benign. Icteric sclerae    Resp:  CTA bilaterally. No wheezing/rhonchi/rales. No accessory muscle use. CV:  Regular rhythm, normal rate, soft systolic murmur     GI: Soft, mildly distended, NT    Musculoskeletal:    No edema, warm, 2+ pulses throughout jaundiced    Neurologic:  Moves all extremities. Ox2, no asterixis     Skin: jaundiced       Data Review:    Review and/or order of clinical lab test  Review and/or order of tests in the radiology section of [facility]      Labs:     Recent Labs     08/31/22 0448 08/30/22 0152   WBC 16.7* 29.9*   HGB 7.8* 9.8*   HCT 22.0* 27.3*    330       Recent Labs     09/01/22 0352 08/31/22 0448 08/30/22  1118 08/30/22  0538   * 131* 132*  --    K 3.3* 3.6 3.5  --     101 102  --    CO2 14* 16* 19*  --    BUN 70* 69* 66*  --    CREA 4.63* 4.45* 4.21*  --    GLU 64* 74 95  --    CA 8.0* 8.2* 7.9*  --    MG  --   --   --  2.2   PHOS 3.8  --   --  4.5   URICA  --   --  8.3*  --        Recent Labs     09/01/22 0352 08/31/22 0448 08/30/22  0538 08/30/22  0152   ALT  --  25  --  35   AP  --  163*  --  230*   TBILI  --  27.4*  --  29.7*   TP  --  4.6*  --  5.0*   ALB 3.2* 2.6*  --  1.8*   GLOB  --  2.0  --  3.2   LPSE  --   --  90  --        Recent Labs     08/31/22 0448 08/30/22 0152   INR 2.1* 1.8*   PTP 20.7* 18.4*        Recent Labs     09/01/22 0352 08/30/22  0538   TIBC 57* 107*   PSAT 82* 32   FERR 443* 1,121*        Lab Results   Component Value Date/Time    Folate 14.7 08/30/2022 01:52 AM        No results for input(s): PH, PCO2, PO2 in the last 72 hours. No results for input(s): CPK, CKNDX, TROIQ in the last 72 hours.     No lab exists for component: CPKMB  No results found for: CHOL, CHOLX, CHLST, CHOLV, HDL, HDLP, LDL, LDLC, DLDLP, TGLX, TRIGL, TRIGP, CHHD, CHHDX  No results found for: [address]  [facility]  Lab Results   Component Value Date/Time    Color DARK YELLOW 08/30/2022 12:08 PM    Appearance TURBID (A) 08/30/2022 12:08 PM    Specific gravity 1.018 08/30/2022 12:08 PM    pH (UA) 5.5 08/30/2022 12:08 PM    Protein 30 (A) 08/30/2022 12:08 PM    Glucose Negative 08/30/2022 12:08 PM    Ketone Negative 08/30/2022 12:08 PM    Urobilinogen 1.0 08/30/2022 12:08 PM    Nitrites Positive (A) 08/30/2022 12:08 PM    Leukocyte Esterase MODERATE (A) 08/30/2022 12:08 PM    Epithelial cells FEW 08/30/2022 12:08 PM    Bacteria Negative 08/30/2022 12:08 PM    WBC 0-4 08/30/2022 12:08 PM    RBC 20-50 08/30/2022 12:08 PM         Medications Reviewed:     Current Facility-Administered Medications   Medication Dose Route Frequency    lidocaine (XYLOCAINE) 2 % viscous solution 15 mL  15 mL Mouth/Throat RAD ONCE    triamcinolone acetonide (KENALOG-40) 40 mg/mL injection 40 mg  40 mg Intra artICUlar RAD ONCE    sodium bicarbonate (8.4%) 150 mEq in sterile water 1,000 mL infusion   IntraVENous CONTINUOUS    potassium bicarb-citric acid (EFFER-K) tablet 40 mEq  40 mEq Oral NOW    albumin human 25% (BUMINATE) solution 25 g  25 g IntraVENous Q6H    phytonadione (vitamin K1) (AQUA-MEPHYTON) 10 mg in 0.9% sodium chloride 50 mL IVPB  10 mg IntraVENous DAILY    midodrine (PROAMATINE) tablet 10 mg  10 mg Oral Q8H    oxyCODONE IR (ROXICODONE) tablet 5 mg  5 mg Oral Q4H PRN    cefTRIAXone (ROCEPHIN) 2 g in 0.9% sodium chloride 20 mL IV syringe  2 g IntraVENous Q24H    ARIPiprazole (ABILIFY) tablet 2 mg  2 mg Oral DAILY    buPROPion SR (WELLBUTRIN SR) tablet 150 mg  150 mg Oral DAILY    LORazepam (ATIVAN) tablet 1 mg  1 mg Oral BID PRN    pantoprazole (PROTONIX) 40 mg in 0.9% sodium chloride 10 mL injection  40 mg IntraVENous ACB    sodium chloride (NS) flush 5-40 mL  5-40 mL IntraVENous Q8H    sodium chloride (NS) flush 5-40 mL  5-40 mL IntraVENous PRN    acetaminophen (TYLENOL) tablet 650 mg  650 mg Oral Q6H PRN    Or    acetaminophen (TYLENOL) suppository 650 mg  650 mg Rectal Q6H PRN    polyethylene glycol (MIRALAX) packet 17 g  17 g Oral DAILY PRN    ondansetron (ZOFRAN ODT) tablet 4 mg  4 mg Oral Q8H PRN    Or    ondansetron (ZOFRAN) injection 4 mg  4 mg IntraVENous Q6H PRN    heparin (porcine) injection 5,000 Units  5,000 Units SubCUTAneous Q8H    L.acidophilus-paracasei-S.thermophil-bifidobacter (RISAQUAD) 8 billion cell capsule  1 Capsule Oral DAILY    nicotine (NICODERM CQ) 21 mg/24 hr patch 1 Patch  1 Patch TransDERmal DAILY     ______________________________________________________________________  EXPECTED LENGTH OF STAY: 4d 16h  ACTUAL LENGTH OF STAY:          2                 Kavya Schmitt MD

## 2022-09-01 NOTE — PROGRESS NOTES
1930 Bedside shift change report given to 1200 Thang Oliva (oncoming nurse) by Alejandro Mitchell (offgoing nurse). Report included the following information SBAR, Kardex, Procedure Summary, Intake/Output, MAR, Recent Results, and Cardiac Rhythm NSR . Pt Sandy Hugger set at [facility]. Dr. Mario Olivo at bedside. MD states no set output parameters are needed at this time & to continue to let it drain. MD also states to hold off on NGT this evening for possible alternative treatments in the morning. 2330 Pt maintaining rectal temp at 98.5-98.7. Sandy hugger turned off at this time. 0730 Bedside shift change report given to Altagracia (oncoming nurse) by 1200 Thang Oliva (offgoing nurse). Report included the following information SBAR, Kardex, Intake/Output, MAR, Recent Results, and Cardiac Rhythm NSR . Pt continues to be off of [employer]. Temp 98.3 rectally. Problem: Falls - Risk of  Goal: *Absence of Falls  Description: Document Boni Lv Fall Risk and appropriate interventions in the flowsheet.   Outcome: Progressing Towards Goal  Note: Fall Risk Interventions:  Mobility Interventions: Assess mobility with egress test, Bed/chair exit alarm, Communicate number of staff needed for ambulation/transfer, OT consult for ADLs, Patient to call before getting OOB, PT Consult for mobility concerns, Strengthening exercises (ROM-active/passive)    Mentation Interventions: Adequate sleep, hydration, pain control, Bed/chair exit alarm, Door open when patient unattended, Evaluate medications/consider consulting pharmacy, Increase mobility, More frequent rounding, Reorient patient, Room close to nurse's station, Toileting rounds, Update white board    Medication Interventions: Bed/chair exit alarm, Evaluate medications/consider consulting pharmacy, Patient to call before getting OOB, Teach patient to arise slowly    Elimination Interventions: Bed/chair exit alarm, Call light in reach, Patient to call for help with toileting needs, Stay With Me (per policy), Toileting schedule/hourly rounds              Problem: Patient Education: Go to Patient Education Activity  Goal: Patient/Family Education  Outcome: Progressing Towards Goal     Problem: Pressure Injury - Risk of  Goal: *Prevention of pressure injury  Description: Document Chandnaa Scale and appropriate interventions in the flowsheet. Outcome: Progressing Towards Goal  Note: Pressure Injury Interventions:  Sensory Interventions: Assess changes in LOC, Assess need for specialty bed, Avoid rigorous massage over bony prominences, Check visual cues for pain, Discuss PT/OT consult with provider, Float heels, Keep linens dry and wrinkle-free, Maintain/enhance activity level, Minimize linen layers, Pressure redistribution bed/mattress (bed type), Turn and reposition approx.  every two hours (pillows and wedges if needed)    Moisture Interventions: Absorbent underpads, Apply protective barrier, creams and emollients, Assess need for specialty bed, Check for incontinence Q2 hours and as needed, Internal/External urinary devices, Minimize layers    Activity Interventions: Assess need for specialty bed, Increase time out of bed, Pressure redistribution bed/mattress(bed type), PT/OT evaluation    Mobility Interventions: Assess need for specialty bed, Float heels, Pressure redistribution bed/mattress (bed type), PT/OT evaluation    Nutrition Interventions: Document food/fluid/supplement intake    Friction and Shear Interventions: Apply protective barrier, creams and emollients, Feet elevated on foot rest, Foam dressings/transparent film/skin sealants, Lift sheet, Minimize layers                Problem: Patient Education: Go to Patient Education Activity  Goal: Patient/Family Education  Outcome: Progressing Towards Goal     Problem: Hypotension  Goal: *Blood pressure within specified parameters  Outcome: Progressing Towards Goal  Goal: *Fluid volume balance  Outcome: Progressing Towards Goal  Goal: *Labs within defined limits  Outcome: Progressing Towards Goal     Problem: Patient Education: Go to Patient Education Activity  Goal: Patient/Family Education  Outcome: Progressing Towards Goal     Problem: General Medical Care Plan  Goal: *Vital signs within specified parameters  Outcome: Progressing Towards Goal  Goal: *Labs within defined limits  Outcome: Progressing Towards Goal  Goal: *Absence of infection signs and symptoms  Outcome: Progressing Towards Goal  Goal: *Optimal pain control at patient's stated goal  Outcome: Progressing Towards Goal  Goal: *Skin integrity maintained  Outcome: Progressing Towards Goal  Goal: *Fluid volume balance  Outcome: Progressing Towards Goal  Goal: *Optimize nutritional status  Outcome: Progressing Towards Goal  Goal: *Anxiety reduced or absent  Outcome: Progressing Towards Goal  Goal: *Progressive mobility and function (eg: ADL's)  Outcome: Progressing Towards Goal     Problem: Patient Education: Go to Patient Education Activity  Goal: Patient/Family Education  Outcome: Progressing Towards Goal     Problem:  Body Temperature -  Risk of, Imbalanced  Goal: *Absence of cold stress or hypothermia signs and symptoms  Outcome: Progressing Towards Goal

## 2022-09-01 NOTE — PROGRESS NOTES
Problem: Mobility Impaired (Adult and Pediatric)  Goal: *Acute Goals and Plan of Care (Insert Text)  Description: FUNCTIONAL STATUS PRIOR TO ADMISSION: Patient was modified independent using a rollator for functional mobility per patient, however patient is questionable historian. Patient notably with gross muscle wasting in LEs and UEs. HOME SUPPORT PRIOR TO ADMISSION: The patient lived alone with no local support. Physical Therapy Goals  Initiated 9/1/2022  1. Patient will move from supine to sit and sit to supine  in bed with modified independence within 7 day(s). 2.  Patient will transfer from bed to chair and chair to bed with modified independence using the least restrictive device within 7 day(s). 3.  Patient will perform sit to stand with modified independence within 7 day(s). 4.  Patient will ambulate with modified independence for 50 feet with the least restrictive device within 7 day(s). 5.  Patient will ascend/descend 3 stairs with bilateral handrail(s) with modified independence within 7 day(s). Outcome: Progressing Towards Goal   PHYSICAL THERAPY EVALUATION  Patient: Manny Collado (72 y.o. female)  Date: 9/1/2022  Primary Diagnosis: Alcoholic cirrhosis of liver with ascites ([facility] [K70.31]       Precautions:   Fall      ASSESSMENT  Based on the objective data described below, the patient presents with decreased activity tolerance, delayed command processing, generalized whole body weakness, orientation x 2, increased need for assistance, and high risk for falls in setting of hospital admission for alcoholic cirrhosis. Patient has delayed command processing however does follow all commands with repeated cues needed at times. Patient required max A supine <> sit, min A for transfers, and ambulated 3 feet bed > chair with min A x 2 required with max cues for sequencing of gait and physical assist needed to push RW as patient is too weak to advance walker independently.  Patient is questionable historian 2/2 orientation x 2, however reports being mod I at baseline with use of RW. Patient will require rehab at discharge to promote return to [facility]; at this time recommend SNF for rehab pending progress. Current Level of Function Impacting Discharge (mobility/balance): ambulates 3 feet bed > chair with min A x 2 and RW     Functional Outcome Measure: The patient scored Total Score: 8/28 on the Tinetti outcome measure which is indicative of high fall risk. Other factors to consider for discharge: alcoholic cirrhosis, delayed command processing, orientation x 2      Patient will benefit from skilled therapy intervention to address the above noted impairments. PLAN :  Recommendations and Planned Interventions: bed mobility training, transfer training, gait training, therapeutic exercises, neuromuscular re-education, patient and family training/education, and therapeutic activities      Frequency/Duration: Patient will be followed by physical therapy:  5 times a week to address goals. Recommendation for discharge: (in order for the patient to meet his/her long term goals)  Therapy up to 5 days/week in SNF setting    This discharge recommendation:  A follow-up discussion with the attending provider and/or case management is planned    IF patient discharges home will need the following DME: patient owns DME required for discharge         SUBJECTIVE:   Patient stated that's not an eye that's my knee.  After this writer asked her to raise her knee     OBJECTIVE DATA SUMMARY:   HISTORY:    Past Medical History:   Diagnosis Date    Liver disease      Past Surgical History:   Procedure Laterality Date    HX ORTHOPAEDIC Right     hip replacement       Personal factors and/or comorbidities impacting plan of care: alcoholic cirrhosis, limited social support    Home Situation  Home Environment: Apartment  # Steps to Enter: 3  Rails to Enter: Yes  Hand Rails : Bilateral  One/Two Story Residence: One story  Living Alone: Yes  Support Systems: Friend/Neighbor  Patient Expects to be Discharged to[de-identified] Home with home health  Current DME Used/Available at Home: [address], [place], straight, Walker, rolling, Zora Mcardle, rollator, Shower chair (patient states she uses rollator within home, [facility] when out in community)  Tub or Shower Type: Tub/Shower combination    EXAMINATION/PRESENTATION/DECISION MAKING:   Critical Behavior:  Neurologic State: Alert, Confused  Orientation Level: Oriented to person, Oriented to situation, Disoriented to time, Disoriented to place  Cognition: Decreased attention/concentration, Follows commands, Impaired decision making, Poor safety awareness  Safety/Judgement: Decreased insight into deficits, Decreased awareness of need for safety, Decreased awareness of need for assistance, Decreased awareness of environment  Hearing:     Skin:  intact; noted jaundice coloring   Edema: none noted   Range Of Motion:  AROM: Generally decreased, functional           PROM: Generally decreased, functional           Strength:    Strength: Generally decreased, functional                    Tone & Sensation:   Tone: Normal              Sensation: Intact               Coordination:  Coordination: Generally decreased, functional  Vision:      Functional Mobility:  Bed Mobility:  Rolling: Minimum assistance;Assist x1  Supine to Sit: Maximum assistance;Assist x2     Scooting: Moderate assistance;Assist x1  Transfers:  Sit to Stand: Minimum assistance;Assist x2  Stand to Sit: Minimum assistance;Assist x1        Bed to Chair: Minimum assistance;Assist x1              Balance:   Sitting: Impaired; Without support  Sitting - Static: Fair (occasional)  Sitting - Dynamic: Fair (occasional)  Standing: Impaired; With support  Standing - Static: Fair  Standing - Dynamic : Fair;Poor;Constant support  Ambulation/Gait Training:  Distance (ft): 3 Feet (ft)  Assistive Device: Gait belt;Walker, rolling  Ambulation - Level of Assistance: Minimal assistance;Assist x1        Gait Abnormalities: Decreased step clearance;Trunk sway increased        Base of Support: Narrowed     Speed/Deana: Slow;Shuffled  Step Length: Right shortened;Left shortened      Functional Measure:  Tinetti test:    Sitting Balance: 1  Arises: 0  Attempts to Rise: 0  Immediate Standing Balance: 0  Standing Balance: 1  Nudged: 0  Eyes Closed: 0  Turn 360 Degrees - Continuous/Discontinuous: 1  Turn 360 Degrees - Steady/Unsteady: 0  Sitting Down: 1  Balance Score: 4 Balance total score  Indication of Gait: 0  R Step Length/Height: 0  L Step Length/Height: 0  R Foot Clearance: 1  L Foot Clearance: 1  Step Symmetry: 1  Step Continuity: 0  Path: 1  Trunk: 0  Walking Time: 0  Gait Score: 4 Gait total score  Total Score: 8/28 Overall total score         Tinetti Tool Score Risk of Falls  <19 = High Fall Risk  19-24 = Moderate Fall Risk  25-28 = Low Fall Risk  Tinetti ME. Performance-Oriented Assessment of Mobility Problems in Elderly Patients. [facility] 66; [id number].  (Scoring Description: PT Bulletin Feb. 10, 1993)    Older adults: Vania Mejia et al, 2009; n = 1000 [facility] elderly evaluated with ABC, BRAXTON, ADL, and IADL)  · Mean BRAXTON score for males aged 69-68 years = 26.21(3.40)  · Mean BRAXTON score for females age 69-68 years = 25.16(4.30)  · Mean BRAXTON score for males over 80 years = 23.29(6.02)  · Mean BRAXTON score for females over 80 years = 17.20(8.32)        Physical Therapy Evaluation Charge Determination   History Examination Presentation Decision-Making   MEDIUM  Complexity : 1-2 comorbidities / personal factors will impact the outcome/ POC  LOW Complexity : 1-2 Standardized tests and measures addressing body structure, function, activity limitation and / or participation in recreation  LOW Complexity : Stable, uncomplicated  Other outcome measures Tinetti  LOW       Based on the above components, the patient evaluation is determined to be of the following complexity level: LOW     Pain Ratin/10    Activity Tolerance:   Fair and requires rest breaks      After treatment patient left in no apparent distress:   Sitting in chair, Call bell within reach, and Bed / chair alarm activated    COMMUNICATION/EDUCATION:   The patients plan of care was discussed with: Occupational therapist and Registered nurse. Fall prevention education was provided and the patient/caregiver indicated understanding.     Thank you for this referral.  Fatmata Handy, PT   Time Calculation: 37 mins

## 2022-09-01 NOTE — PROGRESS NOTES
[facility]0 [facility], MD, FACP, Ayesha Estelle Fernandez, [place]      Tarabetty Harris, EDITH Eng[facility]     [place] HACKETT Bridger, [facility]   ERIC Shore, [facility]       Eleonorabetty [facility] 136    at [address], [facility] Remington Watson  22.    [phone]    FAX: [address] [place] [address] [address], [address]    [phone]    FAX: [phone]       HEPATOLOGY PROGRESS NOTE  The patient is a 65 y.o.  female with a multi-decade history of consuming 1 bottle of wine per night. She was told by per medical provider in the past to cut back on alcohol use. She developed jaundice and scleral icterus 12 days ago. She has developed weakness and is unable to walk. She lives alone and has no family in the area. This is her first ever hospitalization for alcohol related illness. In the ED Laboratory studies were significant for:    WBC 29.9, HB 9.8 gms, , Sna 129, Scr 4.3 mg, AST 91, ALT 35, , TBILI 29.7 mg, INR 1.8, Sammonia 11,     Imaging of the liver with CT scan demonstrated cirrhosis with a large volume of ascites. Hospital Course:  No steroids for severe alcoholic hepatitis because of infection risk. She is too muscle wasted and high WBC and RANDY  Paracentesis catheter placed and is draining ascites. Scr has not coming down with IV albumin  U/O has tailed off a bit. Only 200 cc past 24 hrs. Feeding tube has been placed      Hepatology Plan:  Start tube feedings. Watch Phos for refeeding syndrome and replace with Na Phos as needed. ASSESSMENT AND PLAN:  Cirrhosis  The diagnosis of cirrhosis is based upon imaging, laboratory studies, complications of cirrhosis. Cirrhosis is secondary to alcohol. The CTP is 10. Child class C. The MELD score is 39. Based upon the MELD and CTP scores the patient has a mortality of about 50-70% within the next 90 days   She is not a candidate for LT at this time because of severe malnutrition and advanced muscle wasting. Alcohol liver disease  Suspect the patient has alcohol induced liver disease based upon a history of consuming significant alcohol on a daily basis for many years, pattern of AST>ALT,   Serologies for other causes of chronic liver disease need to be obtained at some point    Ascites   Ascites developed for the first time in 8/2022. Paracentesis catheter placed and >5L ascites drained so far. Continue IV albumin at usual dose    Screening for Esophageal varices   The patient has not had an EGD to screen for varices. Will perform EGD at some point during the hospitalization. Not bleeding. Not top priority. Risk of sedation given severe malnutrition and muscle wasting is considerable. Hepatic encephalopathy   Overt HE has not developed to date. There is no reason for treatment with lactulose of xifaxan  There is no reason to give her diarrhea. There is no need to restrict dietary protein at this time. Coagulopathy  This is secondary to cirrhosis, malnutrition,   Will treat with 3 doses of Vitamin K over 3 days. There is no evidence of bleeding. No need for FFP at this time. Malnutrition/muscle wasting  The patient has severe protein-calorie malnutrition with severe muscle wasting of the upper extremities, lower extremities, facial muscles  Malnutrition and muscle wasting is due to Alcohol abuse and poor caloric intake, cirrhosis and poor caloric utilization, refractory ascites and inability to eat sufficient calories,     The patient needs tube feedings.   It is impossible for her to get any significant amount of calories by eating   Consult nutrition service for formula and to guide volume after feeding tube placed. PHYSICAL EXAMINATION:  VS: per nursing note  General:  Ill appearing  Eyes:  Sclera deeply icteric  ENT:  No oral lesions. Thyroid normal.  Nodes:  No adenopathy. Skin:  Numerous spider angiomata. Jaundice. Respiratory:  Lungs clear to auscultation. Cardiovascular:  Regular heart rate. Abdomen:  Distended with obvious ascites. Extremities:  No lower extremity edema. Severe muscle wasting. Neurologic:  Alert and oriented. Cranial nerves grossly intact. No asterixis. LABORATORY:   Latest Reference Range & Units 8/30/22 01:52 8/31/22 04:48 9/1/22 03:52   WBC 3.6 - 11.0 K/uL 29.9 (H) 16.7 (H)    HGB 11.5 - 16.0 g/dL 9.8 (L) 7.8 (L)    PLATELET 673 - 584 K/uL 330 222    INR 0.9 - 1.1   1.8 (H) 2.1 (H)    Sodium 136 - 145 mmol/L 129 (L) 131 (L) 133 (L)   Potassium 3.5 - 5.1 mmol/L 3.8 3.6 3.3 (L)   Chloride 97 - 108 mmol/L 96 (L) 101 104   CO2 21 - 32 mmol/L 19 (L) 16 (L) 14 (LL)   Glucose 65 - 100 mg/dL 122 (H) 74 64 (L)   BUN 6 - 20 MG/DL 66 (H) 69 (H) 70 (H)   Creatinine 0.55 - 1.02 MG/DL 4.35 (H) 4.45 (H) 4.63 (H)   Bilirubin, total 0.2 - 1.0 MG/DL 29.7 (H) 27.4 (H)    Albumin 3.5 - 5.0 g/dL 1.8 (L) 2.6 (L) 3.2 (L)   ALT 12 - 78 U/L 35 25    AST 15 - 37 U/L 91 (H) 70 (H)    Alk.  phosphatase 45 - 117 U/L 230 (H) 163 (H)    Ammonia, plasma <32 UMOL/L 11     (LL): Data is critically low  (H): Data is abnormally high  (L): Data is abnormally low      Selma Cohen MD  [facility] of [address], [facility] 22.  [phone]  [address]

## 2022-09-01 NOTE — PROGRESS NOTES
0730: Bedside and Verbal shift change report given to PASHA Frias (oncoming nurse) by Bertram Bailey RN (offgoing nurse). Report included the following information SBAR, Kardex, MAR, and Cardiac Rhythm NSR .     0945: Temp noted to be 94 rectal, patsy hugger placed on pt at 43 degrees celsius. Will continue to monitor. 1148: Temp 96.7 rectal, patsy hugger still present at 43 degrees celsius. Continuing to monitor. 1217: Pt transported with nurse, monitor, and patsy hugger for diapov placement via Xray. 1342: Pt returned, connected to monitor and VS obtained. Temp now 98.3 rectal, patsy hugger lowered. 1357: Paracentesis drain pulled slightly upon transport. Paracentesis drain draining, but ultrasound was called to verify / check placement. Per ultrasound, RLQ paracentesis drain is in correct place. 1600: Patsy hugger removed temp 98.9 rectal.     Pt noted to be more withdrawn toward end of shift. 1930: Bedside and Verbal shift change report given to Doc Felty, RN (oncoming nurse) by Talisha Casillas RN (offgoing nurse). Report included the following information SBAR, Kardex, MAR, and Cardiac Rhythm NSR . Pt noted to be more awake and alert with shift report.

## 2022-09-02 LAB
ALBUMIN SERPL-MCNC: 3.5 G/DL (ref 3.5–5)
AMMONIA PLAS-SCNC: 53 UMOL/L
ANION GAP SERPL CALC-SCNC: 14 MMOL/L (ref 5–15)
BASOPHILS # BLD: 0 K/UL (ref 0–0.1)
BASOPHILS NFR BLD: 0 % (ref 0–1)
BUN SERPL-MCNC: 69 MG/DL (ref 6–20)
BUN/CREAT SERPL: 15 (ref 12–20)
CALCIUM SERPL-MCNC: 8.3 MG/DL (ref 8.5–10.1)
CHLORIDE SERPL-SCNC: 103 MMOL/L (ref 97–108)
CO2 SERPL-SCNC: 17 MMOL/L (ref 21–32)
CREAT SERPL-MCNC: 4.55 MG/DL (ref 0.55–1.02)
DIFFERENTIAL METHOD BLD: ABNORMAL
EOSINOPHIL # BLD: 0.2 K/UL (ref 0–0.4)
EOSINOPHIL NFR BLD: 1 % (ref 0–7)
ERYTHROCYTE [DISTWIDTH] IN BLOOD BY AUTOMATED COUNT: 17.4 % (ref 11.5–14.5)
GLUCOSE BLD STRIP.AUTO-MCNC: 113 MG/DL (ref 65–117)
GLUCOSE BLD STRIP.AUTO-MCNC: 154 MG/DL (ref 65–117)
GLUCOSE SERPL-MCNC: 62 MG/DL (ref 65–100)
HCT VFR BLD AUTO: 19.1 % (ref 35–47)
HCT VFR BLD AUTO: 24.7 % (ref 35–47)
HGB BLD-MCNC: 6.7 G/DL (ref 11.5–16)
HGB BLD-MCNC: 8.9 G/DL (ref 11.5–16)
HISTORY CHECKED?,CKHIST: NORMAL
IMM GRANULOCYTES # BLD AUTO: 0 K/UL
IMM GRANULOCYTES NFR BLD AUTO: 0 %
INR PPP: 2.1 (ref 0.9–1.1)
LYMPHOCYTES # BLD: 1.5 K/UL (ref 0.8–3.5)
LYMPHOCYTES NFR BLD: 8 % (ref 12–49)
MAGNESIUM SERPL-MCNC: 1.9 MG/DL (ref 1.6–2.4)
MCH RBC QN AUTO: 35.4 PG (ref 26–34)
MCHC RBC AUTO-ENTMCNC: 35.1 G/DL (ref 30–36.5)
MCV RBC AUTO: 101.1 FL (ref 80–99)
MONOCYTES # BLD: 1.9 K/UL (ref 0–1)
MONOCYTES NFR BLD: 10 % (ref 5–13)
MYELOCYTES NFR BLD MANUAL: 1 %
NEUTS BAND NFR BLD MANUAL: 3 % (ref 0–6)
NEUTS SEG # BLD: 15 K/UL (ref 1.8–8)
NEUTS SEG NFR BLD: 77 % (ref 32–75)
NRBC # BLD: 0 K/UL (ref 0–0.01)
NRBC BLD-RTO: 0 PER 100 WBC
PHOSPHATE SERPL-MCNC: 3.1 MG/DL (ref 2.6–4.7)
PLATELET # BLD AUTO: 154 K/UL (ref 150–400)
PMV BLD AUTO: 10.6 FL (ref 8.9–12.9)
POTASSIUM SERPL-SCNC: 3.7 MMOL/L (ref 3.5–5.1)
PROTHROMBIN TIME: 20.5 SEC (ref 9–11.1)
RBC # BLD AUTO: 1.89 M/UL (ref 3.8–5.2)
RBC MORPH BLD: ABNORMAL
RBC MORPH BLD: ABNORMAL
SERVICE CMNT-IMP: ABNORMAL
SERVICE CMNT-IMP: NORMAL
SODIUM SERPL-SCNC: 134 MMOL/L (ref 136–145)
WBC # BLD AUTO: 18.8 K/UL (ref 3.6–11)

## 2022-09-02 PROCEDURE — 74011000258 HC RX REV CODE- 258: Performed by: INTERNAL MEDICINE

## 2022-09-02 PROCEDURE — 82962 GLUCOSE BLOOD TEST: CPT

## 2022-09-02 PROCEDURE — 74011250636 HC RX REV CODE- 250/636: Performed by: INTERNAL MEDICINE

## 2022-09-02 PROCEDURE — 80069 RENAL FUNCTION PANEL: CPT

## 2022-09-02 PROCEDURE — 94760 N-INVAS EAR/PLS OXIMETRY 1: CPT

## 2022-09-02 PROCEDURE — 85018 HEMOGLOBIN: CPT

## 2022-09-02 PROCEDURE — C9113 INJ PANTOPRAZOLE SODIUM, VIA: HCPCS | Performed by: INTERNAL MEDICINE

## 2022-09-02 PROCEDURE — 65660000001 HC RM ICU INTERMED STEPDOWN

## 2022-09-02 PROCEDURE — 82140 ASSAY OF AMMONIA: CPT

## 2022-09-02 PROCEDURE — 86923 COMPATIBILITY TEST ELECTRIC: CPT

## 2022-09-02 PROCEDURE — P9047 ALBUMIN (HUMAN), 25%, 50ML: HCPCS | Performed by: INTERNAL MEDICINE

## 2022-09-02 PROCEDURE — 74011636637 HC RX REV CODE- 636/637: Performed by: INTERNAL MEDICINE

## 2022-09-02 PROCEDURE — 36415 COLL VENOUS BLD VENIPUNCTURE: CPT

## 2022-09-02 PROCEDURE — 74011250637 HC RX REV CODE- 250/637: Performed by: INTERNAL MEDICINE

## 2022-09-02 PROCEDURE — 74011250637 HC RX REV CODE- 250/637: Performed by: HOSPITALIST

## 2022-09-02 PROCEDURE — 86900 BLOOD TYPING SEROLOGIC ABO: CPT

## 2022-09-02 PROCEDURE — 85610 PROTHROMBIN TIME: CPT

## 2022-09-02 PROCEDURE — 83735 ASSAY OF MAGNESIUM: CPT

## 2022-09-02 PROCEDURE — 36430 TRANSFUSION BLD/BLD COMPNT: CPT

## 2022-09-02 PROCEDURE — 85025 COMPLETE CBC W/AUTO DIFF WBC: CPT

## 2022-09-02 PROCEDURE — P9016 RBC LEUKOCYTES REDUCED: HCPCS

## 2022-09-02 PROCEDURE — 74011000250 HC RX REV CODE- 250: Performed by: INTERNAL MEDICINE

## 2022-09-02 RX ORDER — MIDODRINE HYDROCHLORIDE 5 MG/1
15 TABLET ORAL EVERY 8 HOURS
Status: DISCONTINUED | OUTPATIENT
Start: 2022-09-02 | End: 2022-09-15 | Stop reason: HOSPADM

## 2022-09-02 RX ORDER — SODIUM BICARBONATE 650 MG/1
650 TABLET ORAL 2 TIMES DAILY
Status: DISCONTINUED | OUTPATIENT
Start: 2022-09-02 | End: 2022-09-15 | Stop reason: HOSPADM

## 2022-09-02 RX ORDER — SODIUM CHLORIDE 9 MG/ML
250 INJECTION, SOLUTION INTRAVENOUS AS NEEDED
Status: DISCONTINUED | OUTPATIENT
Start: 2022-09-02 | End: 2022-09-15 | Stop reason: HOSPADM

## 2022-09-02 RX ADMIN — POTASSIUM BICARBONATE 20 MEQ: 782 TABLET, EFFERVESCENT ORAL at 10:48

## 2022-09-02 RX ADMIN — SODIUM CHLORIDE, PRESERVATIVE FREE 10 ML: 5 INJECTION INTRAVENOUS at 06:26

## 2022-09-02 RX ADMIN — HEPARIN SODIUM 5000 UNITS: 5000 INJECTION INTRAVENOUS; SUBCUTANEOUS at 23:57

## 2022-09-02 RX ADMIN — MIDODRINE HYDROCHLORIDE 15 MG: 5 TABLET ORAL at 13:04

## 2022-09-02 RX ADMIN — SODIUM BICARBONATE 650 MG: 650 TABLET ORAL at 10:48

## 2022-09-02 RX ADMIN — BUPROPION HYDROCHLORIDE 150 MG: 150 TABLET, EXTENDED RELEASE ORAL at 10:48

## 2022-09-02 RX ADMIN — Medication 1 CAPSULE: at 10:48

## 2022-09-02 RX ADMIN — MIDODRINE HYDROCHLORIDE 15 MG: 5 TABLET ORAL at 23:54

## 2022-09-02 RX ADMIN — MIDODRINE HYDROCHLORIDE 10 MG: 5 TABLET ORAL at 06:25

## 2022-09-02 RX ADMIN — ALBUMIN (HUMAN) 25 G: 0.25 INJECTION, SOLUTION INTRAVENOUS at 06:24

## 2022-09-02 RX ADMIN — ARIPIPRAZOLE 2 MG: 2 TABLET ORAL at 10:48

## 2022-09-02 RX ADMIN — HEPARIN SODIUM 5000 UNITS: 5000 INJECTION INTRAVENOUS; SUBCUTANEOUS at 16:32

## 2022-09-02 RX ADMIN — OCTREOTIDE ACETATE 50 MCG/HR: 500 INJECTION, SOLUTION INTRAVENOUS; SUBCUTANEOUS at 10:48

## 2022-09-02 RX ADMIN — SODIUM BICARBONATE 650 MG: 650 TABLET ORAL at 17:23

## 2022-09-02 RX ADMIN — PHYTONADIONE 10 MG: 10 INJECTION, EMULSION INTRAMUSCULAR; INTRAVENOUS; SUBCUTANEOUS at 13:03

## 2022-09-02 RX ADMIN — ALBUMIN (HUMAN) 25 G: 0.25 INJECTION, SOLUTION INTRAVENOUS at 23:57

## 2022-09-02 RX ADMIN — LACTULOSE 45 ML: 20 SOLUTION ORAL at 13:04

## 2022-09-02 RX ADMIN — LACTULOSE 45 ML: 20 SOLUTION ORAL at 17:23

## 2022-09-02 RX ADMIN — ALBUMIN (HUMAN) 25 G: 0.25 INJECTION, SOLUTION INTRAVENOUS at 11:11

## 2022-09-02 RX ADMIN — SODIUM CHLORIDE, PRESERVATIVE FREE 10 ML: 5 INJECTION INTRAVENOUS at 23:57

## 2022-09-02 RX ADMIN — PANTOPRAZOLE SODIUM 40 MG: 40 INJECTION, POWDER, FOR SOLUTION INTRAVENOUS at 10:51

## 2022-09-02 RX ADMIN — ALBUMIN (HUMAN) 25 G: 0.25 INJECTION, SOLUTION INTRAVENOUS at 17:23

## 2022-09-02 RX ADMIN — SODIUM CHLORIDE, PRESERVATIVE FREE 10 ML: 5 INJECTION INTRAVENOUS at 13:04

## 2022-09-02 RX ADMIN — SODIUM CHLORIDE, PRESERVATIVE FREE 2 G: 5 INJECTION INTRAVENOUS at 06:15

## 2022-09-02 NOTE — PROGRESS NOTES
Transitions of Care Plan  RUR: 16% - moderate  Clinical Update: paracentesis w/ output; tube feed placed; encephalopathic   Consults: Hepatology; Nephrology; Therapy  Baseline: independent; resides alone; no local social support; hx ETOH  Barrier(s) to Discharge: medical  Disposition: pending medical progress - IPR v LTC (no SNF benefit)  Estimated Discharge Date: 2+ days    Clinical update per chart review and/or patient discussed during Interdisciplinary Rounds:    Patient is not medically stable for discharge due to ongoing medical needs. Patient had IR place dobhoff feeding tube yesterday. Not clinically improving at this time. CM continues to follow treatment plan for medical progress and disposition needs.     Disposition:  IPR vs SNF pending progress  CM escalated case for complexity due to patient's medical condition, no SNF benefit, would need LTC/UAI, no local support, hx of heavy ETOH abuse    Ligia Jain, MPH  Care Manager l [employer]  Available via [facility] or

## 2022-09-02 NOTE — PROGRESS NOTES
Renal Progress Note    NAME:  Damir Bean   :   1960   MRN:   [id number]     Date/Time:  2022 12:09 PM  Subjective:       , feels ok, no N/v/SOB. Worry about her pets   seen and examined, has no complaint, not oriented to place or time, had ascitis drain placed by IR, 3 lit drained sofar, 400 ml uo   more sleepy and less interactive, does not answer the questions. Oliguric, no significant change in cr. Possibly HRS. WBC still elevated.  Has NGT on TF        Medications reviewed:  Current Facility-Administered Medications   Medication Dose Route Frequency    midodrine (PROAMATINE) tablet 15 mg  15 mg Oral Q8H    sodium bicarbonate (8.4%) 150 mEq in dextrose 5% 1,000 mL infusion   IntraVENous CONTINUOUS    octreotide (SANDOSTATIN) 500 mcg in 0.9% sodium chloride 500 mL infusion  50 mcg/hr IntraVENous CONTINUOUS    potassium bicarb-citric acid (EFFER-K) tablet 20 mEq  20 mEq Oral NOW    albumin human 25% (BUMINATE) solution 25 g  25 g IntraVENous Q6H    phytonadione (vitamin K1) (AQUA-MEPHYTON) 10 mg in 0.9% sodium chloride 50 mL IVPB  10 mg IntraVENous DAILY    oxyCODONE IR (ROXICODONE) tablet 5 mg  5 mg Oral Q4H PRN    cefTRIAXone (ROCEPHIN) 2 g in 0.9% sodium chloride 20 mL IV syringe  2 g IntraVENous Q24H    ARIPiprazole (ABILIFY) tablet 2 mg  2 mg Oral DAILY    buPROPion SR (WELLBUTRIN SR) tablet 150 mg  150 mg Oral DAILY    LORazepam (ATIVAN) tablet 1 mg  1 mg Oral BID PRN    pantoprazole (PROTONIX) 40 mg in 0.9% sodium chloride 10 mL injection  40 mg IntraVENous ACB    sodium chloride (NS) flush 5-40 mL  5-40 mL IntraVENous Q8H    sodium chloride (NS) flush 5-40 mL  5-40 mL IntraVENous PRN    acetaminophen (TYLENOL) tablet 650 mg  650 mg Oral Q6H PRN    Or    acetaminophen (TYLENOL) suppository 650 mg  650 mg Rectal Q6H PRN    polyethylene glycol (MIRALAX) packet 17 g  17 g Oral DAILY PRN    ondansetron (ZOFRAN ODT) tablet 4 mg  4 mg Oral Q8H PRN    Or    ondansetron (ZOFRAN) injection 4 mg  4 mg IntraVENous Q6H PRN    heparin (porcine) injection 5,000 Units  5,000 Units SubCUTAneous Q8H    L.acidophilus-paracasei-S.thermophil-bifidobacter (RISAQUAD) 8 billion cell capsule  1 Capsule Oral DAILY    nicotine (NICODERM CQ) 21 mg/24 hr patch 1 Patch  1 Patch TransDERmal DAILY        Objective:   Vitals:  Visit Vitals  BP (!) 95/42 (BP 1 Location: Left arm, BP Patient Position: At rest)   Pulse 86   Temp 98.7 °F (37.1 °C)   Resp 18   Ht 5' 3\" (1.6 m)   Wt 50.1 kg (110 lb 8 oz)   SpO2 100%   BMI 19.57 kg/m²     Temp (24hrs), Av.2 °F (36.8 °C), Min:96.6 °F (35.9 °C), Max:98.8 °F (37.1 °C)      O2 Device: None (Room air)    Last 24hr Input/Output:    Intake/Output Summary (Last 24 hours) at 2022 0841  Last data filed at 2022 1821  Gross per 24 hour   Intake 60 ml   Output 2575 ml   Net -2515 ml          Physical Exam:  General: Jaundiced, sleepy, arouses, not interactive  HEENT:  sclerae icteric, NGT  Neck: Supple  Lungs : Clears to auscultation Bilaterally, Normal respiratory effort  CVS: RRR, S1 S2 normal, No rub, no  LE edema  Abdomen: Soft, Non tender, distended,  Ascitis+, paredes, abdomianl drain+  Extremities: No edema  Skin: jaundice  Neurologic: sleepy    Lab Data Reviewed:    Recent Results (from the past 24 hour(s))   RENAL FUNCTION PANEL    Collection Time: 22  3:24 AM   Result Value Ref Range    Sodium 134 (L) 136 - 145 mmol/L    Potassium 3.7 3.5 - 5.1 mmol/L    Chloride 103 97 - 108 mmol/L    CO2 17 (L) 21 - 32 mmol/L    Anion gap 14 5 - 15 mmol/L    Glucose 62 (L) 65 - 100 mg/dL    BUN 69 (H) 6 - 20 MG/DL    Creatinine 4.55 (H) 0.55 - 1.02 MG/DL    BUN/Creatinine ratio 15 12 - 20      GFR est AA 12 (L) >60 ml/min/1.73m2    GFR est non-AA 10 (L) >60 ml/min/1.73m2    Calcium 8.3 (L) 8.5 - 10.1 MG/DL    Phosphorus 3.1 2.6 - 4.7 MG/DL    Albumin 3.5 3.5 - 5.0 g/dL   MAGNESIUM    Collection Time: 22  3:24 AM   Result Value Ref Range    Magnesium 1.9 1.6 - 2.4 mg/dL   CBC WITH AUTOMATED DIFF    Collection Time: 09/02/22  3:24 AM   Result Value Ref Range    WBC 18.8 (H) 3.6 - 11.0 K/uL    RBC 1.89 (L) 3.80 - 5.20 M/uL    HGB 6.7 (L) 11.5 - 16.0 g/dL    HCT 19.1 (L) 35.0 - 47.0 %    .1 (H) 80.0 - 99.0 FL    MCH 35.4 (H) 26.0 - 34.0 PG    MCHC 35.1 30.0 - 36.5 g/dL    RDW 17.4 (H) 11.5 - 14.5 %    PLATELET 489 768 - 079 K/uL    MPV 10.6 8.9 - 12.9 FL    NRBC 0.0 0  WBC    ABSOLUTE NRBC 0.00 0.00 - 0.01 K/uL    NEUTROPHILS 77 (H) 32 - 75 %    BAND NEUTROPHILS 3 0 - 6 %    LYMPHOCYTES 8 (L) 12 - 49 %    MONOCYTES 10 5 - 13 %    EOSINOPHILS 1 0 - 7 %    BASOPHILS 0 0 - 1 %    MYELOCYTES 1 (H) 0 %    IMMATURE GRANULOCYTES 0 %    ABS. NEUTROPHILS 15.0 (H) 1.8 - 8.0 K/UL    ABS. LYMPHOCYTES 1.5 0.8 - 3.5 K/UL    ABS. MONOCYTES 1.9 (H) 0.0 - 1.0 K/UL    ABS. EOSINOPHILS 0.2 0.0 - 0.4 K/UL    ABS. BASOPHILS 0.0 0.0 - 0.1 K/UL    ABS. IMM. GRANS. 0.0 K/UL    DF MANUAL      RBC COMMENTS ANISOCYTOSIS  1+        RBC COMMENTS MACROCYTOSIS  1+       PROTHROMBIN TIME + INR    Collection Time: 09/02/22  3:24 AM   Result Value Ref Range    INR 2.1 (H) 0.9 - 1.1      Prothrombin time 20.5 (H) 9.0 - 11.1 sec   GLUCOSE, POC    Collection Time: 09/02/22  6:11 AM   Result Value Ref Range    Glucose (POC) 113 65 - 117 mg/dL    Performed by Enio Tubbs RN    TYPE & SCREEN    Collection Time: 09/02/22  7:19 AM   Result Value Ref Range    Crossmatch Expiration 09/05/2022,2359     ABO/Rh(D) Radha Trujillo POSITIVE     Antibody screen NEG          Assessment/Plan:   Principal Problem:    Alcoholic cirrhosis of liver with ascites ([facility] [address] (8/30/2022)    Active Problems:    Severe protein-calorie malnutrition ([facility] [address] (8/31/2022)     Acute kidney injury -cr 0.4 in early August,   -RANDY/possible ATN due to intravascular volume depletion in setting of poor po intake, hypotension /third spacing and possible sepsis/NSAIDs. Hepatorenal in DDx .  Low FeNa-low urine output  -no improvement in renal function with IVF/albumin, midodrine. UO still low. Possible HRS. Not a candidate liver transplant Per Dr Glory Galeana. Not a candidate for RRT. -increase Midodrine 15 mg tid  -add octreotide  -c/w with IV albumin  -DC IVF now that she is on tube feeding, will add po bicarb  -strict Is and Os  -avoid nephrotoxins, IV contrast/NSAIDs  -dose meds per GFR  -f/up  renal function     Hypotension-with h/o HTN on Coreg, due to effective intravascular volume depletion  -on prednisone per med list, but patient denies taking that. Although she is not fully oriented.   -midodrine     Hyponatremia, due to liver cirrhosis and volume depletion, chronic since early August  -improving with IVF  -Avoid rapid correction  -improving    Hypokalemia   -replete prn, ordered    Alcoholic liver cirrhosis-decompensated. -Hypoalbuminemia/ascitis/coagulopathy-nl ammonia  -hepatology following-not a candidate for liver transplant.  Poor prognosis    Anemia  -high iron stores  -Hb has dropped, might be due to hydration, r/o bleed  -transfuse for Hb<7    On Rocephin  Hypoglycemia, on TF-monitor  Check ammonia  ___________________________________________________    Total time spent with patient:  [] 15   [] 25   [x] 35   []  __ minutes    [] Critical Care Provided    Care Plan discussed with:    [x] Patient   [] Family    [] Care Manager   [] Consultant/Specialist :      []   >50% of visit spent in counseling and coordination of care   (Discussed [] CODE status,  [] Care Plan, [] D/C Planning)    ___________________________________________________    Attending Physician: MD Randall Stafford

## 2022-09-02 NOTE — PROGRESS NOTES
[address] [facility]  Hospitalist Group                                                                                          Hospitalist Progress Note  Arthur Oconnor MD  Answering service: [phone] OR [phone] from in house phone        Date of Service:  2022  NAME:  Dorice Boas  :  1960  MRN:  [id number]      Admission Summary:   Per H&P, This is a 73-year-old woman with a past medical history significant for hypertension, alcoholic liver cirrhosis, who presented at the emergency room with abdominal pain. The patient presented at [facility] Emergency Room. When the patient arrived at the emergency room, the patient was found to have decompensation of liver cirrhosis. The hospitalist service at [address] was asked to directly admit the patient to [address] [place] [address], so that the patient can be seen by the hepatologist.  The patient came to the emergency room at [address], because of lack of bed on the on the medical floor. When the patient arrived at the emergency room, the patient's lab work shows significant abnormality. She was subsequently referred to the hospitalist service for admission. No record of prior admission to this hospital.  It was reported that the patient underwent abdominal paracentesis before coming to [address].  The amount of fluid removed is not known. The patient's abdominal pain is diffuse in location, dull ache, constant, 7/10 in severity with no known aggravating or relieving factors associated with nausea, but no vomiting, no diarrhea, and no constipation. Interval history / Subjective:   No acute events overnight, she offers no complaints, but she is lethargic/somnolent and a poor historian. UOP has been poor. I spoke to her sister Parish Harris who is next of kin (no children, no spouse, mother in memory care). Updated on the patient's clinical status. She provided some additional background history. Given the patient's decline and worsening mental status, we discussed goals of care. She stated she wants to make sure she is comfortable. I recommended we continue current treatment as we currently have intentions to turn things around, but if she rapidly deteriorated we would not escalate level of care (I.e., no ICU and no CPR). If the patient shows no signs of improvement over the next few days, she stated she wants to just focus on comfort, and we discussed comfort care. She will be here Tuesday 9/6. Assessment & Plan:        Decompensated alcoholic cirrhosis of the liver  S/p therapeutic paracentesis  She did have EGD on 8/8 at 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 Start PO lactulose. Severe protein calorie malnutrition  Muscle wasting, bitemporal wasting. Due to her disease process  Per hepatology will start tube feeds  Consulting with ANGIE MULLINS advanced to duodenum 9/1 and resumed TFs    Acute kidney injury  Suspected ATN, IVVD due to poor PO intake, hypotension, NSAID use. HRS on ddx. No improvement with IV albumin but continue the same. Off IVF per nephrology now that she is on TFs and bicarb will be added through feeding tube    Hypotension  Continue midodrine, IV albumin and IV fluids as above    Anemia  Iron studies consistent with chronic disease  Transfuse PRN - unit ordered for 9/2  Vitamin K per hepatology for coagulopathy    History of HTN    This patient is critically-ill and at risk for decline due to: decompensated cirrhosis, renal failure and hypotension. CCT 35 minutes. Code status: DNR - continue full current care otherwise but do not escalate (if pt rapidly deteriorates make comfort care and inform sister over the phone 24/7).   Prophylaxis: SCD  Care Plan discussed with: pt RN CM pt's sister over the phone     Hospital Problems  Date Reviewed: 8/30/2022            Codes Class Noted POA    Severe protein-calorie malnutrition ([facility] [address] (Chronic) ICD-10-CM: W74  ICD-9-CM: 679  8/31/2022 Yes        * (Principal) Alcoholic cirrhosis of liver with ascites ([facility] [address] ICD-10-CM: K70.31  ICD-9-CM: 571.2  8/30/2022 Yes         Review of Systems:   Review of systems not obtained due to patient factors. Vital Signs:    Last 24hrs VS reviewed since prior progress note. Most recent are:  Visit Vitals  BP (!) 101/53   Pulse 83   Temp 98 °F (36.7 °C)   Resp 15   Ht 5' 3\" (1.6 m)   Wt 48.1 kg (106 lb)   SpO2 100%   BMI 18.78 kg/m²         Intake/Output Summary (Last 24 hours) at 9/2/2022 1609  Last data filed at 9/2/2022 1400  Gross per 24 hour   Intake 0 ml   Output 5850 ml   Net -5850 ml        Physical Examination:     I had a face to face encounter with this patient and independently examined them on 9/2/2022 as outlined below:          Constitutional:  NAD, cachectic, chronically-ill appearing   ENT:  Oral mucosa moist, oropharynx benign. Icteric sclerae    Resp:  CTA bilaterally. No wheezing/rhonchi/rales. No accessory muscle use.     CV:  Regular rhythm, normal rate, soft systolic murmur     GI: Soft, mildly distended, NT    Musculoskeletal:    No edema, warm, 2+ pulses throughout jaundiced    Neurologic:  Somnolent/lethargic, grossly non-focal     Skin: jaundiced       Data Review:    Review and/or order of clinical lab test  Review and/or order of tests in the radiology section of CPT      Labs:     Recent Labs     09/02/22  0324 08/31/22 0448   WBC 18.8* 16.7*   HGB 6.7* 7.8*   HCT 19.1* 22.0*    222     Recent Labs     09/02/22  0324 09/01/22  0352 08/31/22 0448   * 133* 131*   K 3.7 3.3* 3.6    104 101   CO2 17* 14* 16*   BUN 69* 70* 69*   CREA 4.55* 4.63* 4.45*   GLU 62* 64* 74   CA 8.3* 8.0* 8.2*   MG 1.9  --   --    PHOS 3.1 3.8  -- Recent Labs     09/02/22 0324 09/01/22 0352 08/31/22 0448   ALT  --   --  25   AP  --   --  163*   TBILI  --   --  27.4*   TP  --   --  4.6*   ALB 3.5 3.2* 2.6*   GLOB  --   --  2.0     Recent Labs     09/02/22 0324 08/31/22 0448   INR 2.1* 2.1*   PTP 20.5* 20.7*      Recent Labs     09/01/22 0352   TIBC 57*   PSAT 82*   FERR 443*      Lab Results   Component Value Date/Time    Folate 14.7 08/30/2022 01:52 AM        No results for input(s): PH, PCO2, PO2 in the last 72 hours. No results for input(s): CPK, CKNDX, TROIQ in the last 72 hours.     No lab exists for component: CPKMB  No results found for: CHOL, CHOLX, CHLST, CHOLV, HDL, HDLP, LDL, LDLC, DLDLP, TGLX, TRIGL, TRIGP, CHHD, CHHDX  Lab Results   Component Value Date/Time    Glucose (POC) 154 (H) 09/02/2022 01:43 PM    Glucose (POC) 113 09/02/2022 06:11 AM     Lab Results   Component Value Date/Time    Color DARK YELLOW 08/30/2022 12:08 PM    Appearance TURBID (A) 08/30/2022 12:08 PM    Specific gravity 1.018 08/30/2022 12:08 PM    pH (UA) 5.5 08/30/2022 12:08 PM    Protein 30 (A) 08/30/2022 12:08 PM    Glucose Negative 08/30/2022 12:08 PM    Ketone Negative 08/30/2022 12:08 PM    Urobilinogen 1.0 08/30/2022 12:08 PM    Nitrites Positive (A) 08/30/2022 12:08 PM    Leukocyte Esterase MODERATE (A) 08/30/2022 12:08 PM    Epithelial cells FEW 08/30/2022 12:08 PM    Bacteria Negative 08/30/2022 12:08 PM    WBC 0-4 08/30/2022 12:08 PM    RBC 20-50 08/30/2022 12:08 PM         Medications Reviewed:     Current Facility-Administered Medications   Medication Dose Route Frequency    midodrine (PROAMATINE) tablet 15 mg  15 mg Oral Q8H    octreotide (SANDOSTATIN) 500 mcg in 0.9% sodium chloride 500 mL infusion  50 mcg/hr IntraVENous CONTINUOUS    sodium bicarbonate tablet 650 mg  650 mg Oral BID    0.9% sodium chloride infusion 250 mL  250 mL IntraVENous PRN    lactulose (CHRONULAC) 10 gram/15 mL solution 45 mL  30 g Oral BID    albumin human 25% (BUMINATE) solution 25 g  25 g IntraVENous Q6H    oxyCODONE IR (ROXICODONE) tablet 5 mg  5 mg Oral Q4H PRN    cefTRIAXone (ROCEPHIN) 2 g in 0.9% sodium chloride 20 mL IV syringe  2 g IntraVENous Q24H    ARIPiprazole (ABILIFY) tablet 2 mg  2 mg Oral DAILY    buPROPion SR (WELLBUTRIN SR) tablet 150 mg  150 mg Oral DAILY    LORazepam (ATIVAN) tablet 1 mg  1 mg Oral BID PRN    pantoprazole (PROTONIX) 40 mg in 0.9% sodium chloride 10 mL injection  40 mg IntraVENous ACB    sodium chloride (NS) flush 5-40 mL  5-40 mL IntraVENous Q8H    sodium chloride (NS) flush 5-40 mL  5-40 mL IntraVENous PRN    acetaminophen (TYLENOL) tablet 650 mg  650 mg Oral Q6H PRN    Or    acetaminophen (TYLENOL) suppository 650 mg  650 mg Rectal Q6H PRN    polyethylene glycol (MIRALAX) packet 17 g  17 g Oral DAILY PRN    ondansetron (ZOFRAN ODT) tablet 4 mg  4 mg Oral Q8H PRN    Or    ondansetron (ZOFRAN) injection 4 mg  4 mg IntraVENous Q6H PRN    heparin (porcine) injection 5,000 Units  5,000 Units SubCUTAneous Q8H    L.acidophilus-paracasei-S.thermophil-bifidobacter (RISAQUAD) 8 billion cell capsule  1 Capsule Oral DAILY    nicotine (NICODERM CQ) 21 mg/24 hr patch 1 Patch  1 Patch TransDERmal DAILY     ______________________________________________________________________  EXPECTED LENGTH OF STAY: 4d 16h  ACTUAL LENGTH OF STAY:          3                 Bartolome Bolanos MD

## 2022-09-02 NOTE — PROGRESS NOTES
Occupational Therapy:     Chart reviewed in preparation for OT tx session. Attempted to see pt, however currently receiving blood transfusion. Will defer and follow up as able and appropriate.      Thank you,   Jeanette Verde, OTJOSH, OTR/L

## 2022-09-02 NOTE — PROGRESS NOTES
Physical Therapy - defer  Chart reviewed in preparation for PT tx session. Attempted to see pt, however currently receiving blood transfusion. Will defer and follow up as able and appropriate.

## 2022-09-03 LAB
ABO + RH BLD: NORMAL
ALBUMIN SERPL-MCNC: 3.8 G/DL (ref 3.5–5)
ANION GAP SERPL CALC-SCNC: 12 MMOL/L (ref 5–15)
BASOPHILS # BLD: 0 K/UL (ref 0–0.1)
BASOPHILS NFR BLD: 0 % (ref 0–1)
BLD PROD TYP BPU: NORMAL
BLOOD GROUP ANTIBODIES SERPL: NORMAL
BPU ID: NORMAL
BUN SERPL-MCNC: 67 MG/DL (ref 6–20)
BUN/CREAT SERPL: 15 (ref 12–20)
CALCIUM SERPL-MCNC: 9.1 MG/DL (ref 8.5–10.1)
CHLORIDE SERPL-SCNC: 105 MMOL/L (ref 97–108)
CO2 SERPL-SCNC: 20 MMOL/L (ref 21–32)
CREAT SERPL-MCNC: 4.44 MG/DL (ref 0.55–1.02)
CROSSMATCH RESULT,%XM: NORMAL
DIFFERENTIAL METHOD BLD: ABNORMAL
EOSINOPHIL # BLD: 0.4 K/UL (ref 0–0.4)
EOSINOPHIL NFR BLD: 2 % (ref 0–7)
ERYTHROCYTE [DISTWIDTH] IN BLOOD BY AUTOMATED COUNT: 21.2 % (ref 11.5–14.5)
GLUCOSE BLD STRIP.AUTO-MCNC: 171 MG/DL (ref 65–117)
GLUCOSE SERPL-MCNC: 142 MG/DL (ref 65–100)
HCT VFR BLD AUTO: 28 % (ref 35–47)
HGB BLD-MCNC: 9.9 G/DL (ref 11.5–16)
IMM GRANULOCYTES # BLD AUTO: 0 K/UL
IMM GRANULOCYTES NFR BLD AUTO: 0 %
LYMPHOCYTES # BLD: 1.5 K/UL (ref 0.8–3.5)
LYMPHOCYTES NFR BLD: 7 % (ref 12–49)
MAGNESIUM SERPL-MCNC: 1.9 MG/DL (ref 1.6–2.4)
MCH RBC QN AUTO: 34.3 PG (ref 26–34)
MCHC RBC AUTO-ENTMCNC: 35.4 G/DL (ref 30–36.5)
MCV RBC AUTO: 96.9 FL (ref 80–99)
METAMYELOCYTES NFR BLD MANUAL: 1 %
MONOCYTES # BLD: 1.3 K/UL (ref 0–1)
MONOCYTES NFR BLD: 6 % (ref 5–13)
MYELOCYTES NFR BLD MANUAL: 1 %
NEUTS SEG # BLD: 17.8 K/UL (ref 1.8–8)
NEUTS SEG NFR BLD: 83 % (ref 32–75)
NRBC # BLD: 0 K/UL (ref 0–0.01)
NRBC BLD-RTO: 0 PER 100 WBC
PHOSPHATE SERPL-MCNC: 2.7 MG/DL (ref 2.6–4.7)
PLATELET # BLD AUTO: 149 K/UL (ref 150–400)
PMV BLD AUTO: 10.8 FL (ref 8.9–12.9)
POTASSIUM SERPL-SCNC: 3 MMOL/L (ref 3.5–5.1)
RBC # BLD AUTO: 2.89 M/UL (ref 3.8–5.2)
RBC MORPH BLD: ABNORMAL
SERVICE CMNT-IMP: ABNORMAL
SODIUM SERPL-SCNC: 137 MMOL/L (ref 136–145)
SPECIMEN EXP DATE BLD: NORMAL
STATUS OF UNIT,%ST: NORMAL
UNIT DIVISION, %UDIV: 0
WBC # BLD AUTO: 21.4 K/UL (ref 3.6–11)

## 2022-09-03 PROCEDURE — 74011250637 HC RX REV CODE- 250/637: Performed by: INTERNAL MEDICINE

## 2022-09-03 PROCEDURE — 74011250636 HC RX REV CODE- 250/636: Performed by: INTERNAL MEDICINE

## 2022-09-03 PROCEDURE — 82962 GLUCOSE BLOOD TEST: CPT

## 2022-09-03 PROCEDURE — 80069 RENAL FUNCTION PANEL: CPT

## 2022-09-03 PROCEDURE — 99232 SBSQ HOSP IP/OBS MODERATE 35: CPT | Performed by: INTERNAL MEDICINE

## 2022-09-03 PROCEDURE — 36415 COLL VENOUS BLD VENIPUNCTURE: CPT

## 2022-09-03 PROCEDURE — 94760 N-INVAS EAR/PLS OXIMETRY 1: CPT

## 2022-09-03 PROCEDURE — 85025 COMPLETE CBC W/AUTO DIFF WBC: CPT

## 2022-09-03 PROCEDURE — 74011000250 HC RX REV CODE- 250: Performed by: INTERNAL MEDICINE

## 2022-09-03 PROCEDURE — C9113 INJ PANTOPRAZOLE SODIUM, VIA: HCPCS | Performed by: INTERNAL MEDICINE

## 2022-09-03 PROCEDURE — 83735 ASSAY OF MAGNESIUM: CPT

## 2022-09-03 PROCEDURE — 74011636637 HC RX REV CODE- 636/637: Performed by: INTERNAL MEDICINE

## 2022-09-03 PROCEDURE — 65660000001 HC RM ICU INTERMED STEPDOWN

## 2022-09-03 PROCEDURE — P9047 ALBUMIN (HUMAN), 25%, 50ML: HCPCS | Performed by: INTERNAL MEDICINE

## 2022-09-03 PROCEDURE — 74011250636 HC RX REV CODE- 250/636: Performed by: HOSPITALIST

## 2022-09-03 RX ORDER — POTASSIUM CHLORIDE 7.45 MG/ML
10 INJECTION INTRAVENOUS
Status: DISCONTINUED | OUTPATIENT
Start: 2022-09-03 | End: 2022-09-03

## 2022-09-03 RX ORDER — POTASSIUM CHLORIDE 7.45 MG/ML
10 INJECTION INTRAVENOUS
Status: COMPLETED | OUTPATIENT
Start: 2022-09-03 | End: 2022-09-03

## 2022-09-03 RX ADMIN — POTASSIUM CHLORIDE 10 MEQ: 7.46 INJECTION, SOLUTION INTRAVENOUS at 13:13

## 2022-09-03 RX ADMIN — PANTOPRAZOLE SODIUM 40 MG: 40 INJECTION, POWDER, FOR SOLUTION INTRAVENOUS at 07:01

## 2022-09-03 RX ADMIN — ALBUMIN (HUMAN) 25 G: 0.25 INJECTION, SOLUTION INTRAVENOUS at 17:35

## 2022-09-03 RX ADMIN — MIDODRINE HYDROCHLORIDE 15 MG: 5 TABLET ORAL at 13:13

## 2022-09-03 RX ADMIN — ALBUMIN (HUMAN) 25 G: 0.25 INJECTION, SOLUTION INTRAVENOUS at 13:07

## 2022-09-03 RX ADMIN — BUPROPION HYDROCHLORIDE 150 MG: 150 TABLET, EXTENDED RELEASE ORAL at 10:03

## 2022-09-03 RX ADMIN — MIDODRINE HYDROCHLORIDE 15 MG: 5 TABLET ORAL at 05:29

## 2022-09-03 RX ADMIN — ALBUMIN (HUMAN) 25 G: 0.25 INJECTION, SOLUTION INTRAVENOUS at 05:29

## 2022-09-03 RX ADMIN — SODIUM CHLORIDE, PRESERVATIVE FREE 2 G: 5 INJECTION INTRAVENOUS at 04:20

## 2022-09-03 RX ADMIN — HEPARIN SODIUM 5000 UNITS: 5000 INJECTION INTRAVENOUS; SUBCUTANEOUS at 17:35

## 2022-09-03 RX ADMIN — SODIUM CHLORIDE, PRESERVATIVE FREE 10 ML: 5 INJECTION INTRAVENOUS at 13:07

## 2022-09-03 RX ADMIN — POTASSIUM CHLORIDE 10 MEQ: 7.46 INJECTION, SOLUTION INTRAVENOUS at 12:00

## 2022-09-03 RX ADMIN — HEPARIN SODIUM 5000 UNITS: 5000 INJECTION INTRAVENOUS; SUBCUTANEOUS at 23:11

## 2022-09-03 RX ADMIN — OCTREOTIDE ACETATE 50 MCG/HR: 500 INJECTION, SOLUTION INTRAVENOUS; SUBCUTANEOUS at 23:54

## 2022-09-03 RX ADMIN — Medication 1 CAPSULE: at 10:03

## 2022-09-03 RX ADMIN — HEPARIN SODIUM 5000 UNITS: 5000 INJECTION INTRAVENOUS; SUBCUTANEOUS at 08:49

## 2022-09-03 RX ADMIN — ARIPIPRAZOLE 2 MG: 2 TABLET ORAL at 10:02

## 2022-09-03 RX ADMIN — SODIUM BICARBONATE 650 MG: 650 TABLET ORAL at 10:03

## 2022-09-03 RX ADMIN — SODIUM CHLORIDE, PRESERVATIVE FREE 10 ML: 5 INJECTION INTRAVENOUS at 05:22

## 2022-09-03 RX ADMIN — ALBUMIN (HUMAN) 25 G: 0.25 INJECTION, SOLUTION INTRAVENOUS at 23:12

## 2022-09-03 RX ADMIN — POTASSIUM CHLORIDE 10 MEQ: 7.46 INJECTION, SOLUTION INTRAVENOUS at 09:31

## 2022-09-03 RX ADMIN — OCTREOTIDE ACETATE 50 MCG/HR: 500 INJECTION, SOLUTION INTRAVENOUS; SUBCUTANEOUS at 00:21

## 2022-09-03 RX ADMIN — MIDODRINE HYDROCHLORIDE 15 MG: 5 TABLET ORAL at 23:11

## 2022-09-03 RX ADMIN — POTASSIUM CHLORIDE 10 MEQ: 7.46 INJECTION, SOLUTION INTRAVENOUS at 11:25

## 2022-09-03 RX ADMIN — SODIUM CHLORIDE, PRESERVATIVE FREE 10 ML: 5 INJECTION INTRAVENOUS at 22:00

## 2022-09-03 NOTE — PROGRESS NOTES
[address] [facility] Adult  Hospitalist Group                                                                                          Hospitalist Progress Note  Te Reese MD  Answering service: [phone] from in house phone        Date of Service:  9/3/2022  NAME:  Duncan Cline  :  1960  MRN:  [id number]      Admission Summary:   Per H&P, This is a 69-year-old woman with a past medical history significant for hypertension, alcoholic liver cirrhosis, who presented at the emergency room with abdominal pain. The patient presented at [facility] [place] Emergency Room. When the patient arrived at the emergency room, the patient was found to have decompensation of liver cirrhosis. The hospitalist service at [facility] was asked to directly admit the patient to [facility], so that the patient can be seen by the hepatologist.  The patient came to the emergency room at [facility], because of lack of bed on the on the medical floor. When the patient arrived at the emergency room, the patient's lab work shows significant abnormality. She was subsequently referred to the hospitalist service for admission. No record of prior admission to this hospital.  It was reported that the patient underwent abdominal paracentesis before coming to [facility].  The amount of fluid removed is not known. The patient's abdominal pain is diffuse in location, dull ache, constant, 7/10 in severity with no known aggravating or relieving factors associated with nausea, but no vomiting, no diarrhea, and no constipation. Interval history / Subjective:   Pulled out NGT overnight. This AM she is somnolent and lethargic and unable to provide any history.      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 Started lactulose 9/2, had multiple bm's, no improvement in MS. NGT out overnight. Hold additional lactulose for now. Severe protein calorie malnutrition  Muscle wasting, bitemporal wasting. Due to her disease process  Per hepatology will start tube feeds  Consulting with ANGIE MULLINS advanced to duodenum 9/1 and resumed TFs - FT came out overnight, will see if IR can replace today otherwise place a regular NGT. Acute kidney injury  Suspected ATN, IVVD due to poor PO intake, hypotension, NSAID use. HRS on ddx. No improvement with IV albumin but continue the same. Off IVF per nephrology now that she is on TFs and bicarb will be added through feeding tube    Hypotension  Continue midodrine, IV albumin and IV fluids as above    Anemia  Iron studies consistent with chronic disease  Transfuse PRN - unit ordered for 9/2  Vitamin K per hepatology for coagulopathy    History of HTN    This patient is critically-ill and at risk for decline due to: decompensated cirrhosis, renal failure and hypotension. CCT 35 minutes. Code status: DNR - continue full current care otherwise but do not escalate (if pt rapidly deteriorates make comfort care and inform sister over the phone 24/7). See 9/2 PN for my discussion with her. Prophylaxis: SCD  Care Plan discussed with: RN     Hospital Problems  Date Reviewed: 8/30/2022            Codes Class Noted POA    Severe protein-calorie malnutrition ([facility] [address] (Chronic) ICD-10-CM: A55  ICD-9-CM: 540  8/31/2022 Yes        * (Principal) Alcoholic cirrhosis of liver with ascites ([facility] [address] ICD-10-CM: K70.31  ICD-9-CM: 571.2  8/30/2022 Yes         Review of Systems:   Review of systems not obtained due to patient factors. Vital Signs:    Last 24hrs VS reviewed since prior progress note.  Most recent are:  Visit Vitals  BP (!) 116/54   Pulse 67   Temp 98.3 °F (36.8 °C) Resp 16   Ht 5' 3\" (1.6 m)   Wt 48.1 kg (106 lb)   SpO2 97%   BMI 18.78 kg/m²         Intake/Output Summary (Last 24 hours) at 9/3/2022 1151  Last data filed at 9/3/2022 0900  Gross per 24 hour   Intake 855 ml   Output 2925 ml   Net -2070 ml          Physical Examination:     I had a face to face encounter with this patient and independently examined them on 9/3/2022 as outlined below:          Constitutional:  NAD, cachectic, chronically-ill appearing   ENT:  Oral mucosa moist, oropharynx benign. Icteric sclerae    Resp:  CTA bilaterally. No wheezing/rhonchi/rales. No accessory muscle use. CV:  Regular rhythm, normal rate, soft systolic murmur     GI: Soft, mildly distended, NT    Musculoskeletal:    No edema, warm, 2+ pulses throughout jaundiced    Neurologic:  Somnolent/lethargic, grossly non-focal     Skin: jaundiced       Data Review:    Review and/or order of clinical lab test  Review and/or order of tests in the radiology section of CPT      Labs:     Recent Labs     09/03/22 0518 09/02/22 1801 09/02/22 0324   WBC 21.4*  --  18.8*   HGB 9.9* 8.9* 6.7*   HCT 28.0* 24.7* 19.1*   *  --  154       Recent Labs     09/03/22 0518 09/02/22 0324 09/01/22 0352    134* 133*   K 3.0* 3.7 3.3*    103 104   CO2 20* 17* 14*   BUN 67* 69* 70*   CREA 4.44* 4.55* 4.63*   * 62* 64*   CA 9.1 8.3* 8.0*   MG 1.9 1.9  --    PHOS 2.7 3.1 3.8       Recent Labs     09/03/22 0518 09/02/22 0324 09/01/22 0352   ALB 3.8 3.5 3.2*       Recent Labs     09/02/22 0324   INR 2.1*   PTP 20.5*        Recent Labs     09/01/22 0352   TIBC 57*   PSAT 82*   FERR 443*        Lab Results   Component Value Date/Time    Folate 14.7 08/30/2022 01:52 AM        No results for input(s): PH, PCO2, PO2 in the last 72 hours. No results for input(s): CPK, CKNDX, TROIQ in the last 72 hours.     No lab exists for component: CPKMB  No results found for: CHOL, CHOLX, CHLST, CHOLV, HDL, HDLP, LDL, LDLC, DLDLP, TGLX, TRIGL, Morales Cisneros [facility], [facility]  Lab Results   Component Value Date/Time    Glucose (POC) 171 (H) 09/03/2022 12:06 AM    Glucose (POC) 154 (H) 09/02/2022 01:43 PM    Glucose (POC) 113 09/02/2022 06:11 AM     Lab Results   Component Value Date/Time    Color DARK YELLOW 08/30/2022 12:08 PM    Appearance TURBID (A) 08/30/2022 12:08 PM    Specific gravity 1.018 08/30/2022 12:08 PM    pH (UA) 5.5 08/30/2022 12:08 PM    Protein 30 (A) 08/30/2022 12:08 PM    Glucose Negative 08/30/2022 12:08 PM    Ketone Negative 08/30/2022 12:08 PM    Urobilinogen 1.0 08/30/2022 12:08 PM    Nitrites Positive (A) 08/30/2022 12:08 PM    Leukocyte Esterase MODERATE (A) 08/30/2022 12:08 PM    Epithelial cells FEW 08/30/2022 12:08 PM    Bacteria Negative 08/30/2022 12:08 PM    WBC 0-4 08/30/2022 12:08 PM    RBC 20-50 08/30/2022 12:08 PM         Medications Reviewed:     Current Facility-Administered Medications   Medication Dose Route Frequency    potassium chloride 10 mEq in 100 ml IVPB  10 mEq IntraVENous Q1H    midodrine (PROAMATINE) tablet 15 mg  15 mg Oral Q8H    octreotide (SANDOSTATIN) 500 mcg in 0.9% sodium chloride 500 mL infusion  50 mcg/hr IntraVENous CONTINUOUS    sodium bicarbonate tablet 650 mg  650 mg Oral BID    0.9% sodium chloride infusion 250 mL  250 mL IntraVENous PRN    [Held by provider] lactulose (CHRONULAC) 10 gram/15 mL solution 45 mL  30 g Oral BID    albumin human 25% (BUMINATE) solution 25 g  25 g IntraVENous Q6H    [Held by provider] oxyCODONE IR (ROXICODONE) tablet 5 mg  5 mg Oral Q4H PRN    cefTRIAXone (ROCEPHIN) 2 g in 0.9% sodium chloride 20 mL IV syringe  2 g IntraVENous Q24H    ARIPiprazole (ABILIFY) tablet 2 mg  2 mg Oral DAILY    buPROPion SR (WELLBUTRIN SR) tablet 150 mg  150 mg Oral DAILY    [Held by provider] LORazepam (ATIVAN) tablet 1 mg  1 mg Oral BID PRN    pantoprazole (PROTONIX) 40 mg in 0.9% sodium chloride 10 mL injection  40 mg IntraVENous ACB    sodium chloride (NS) flush 5-40 mL  5-40 mL IntraVENous Q8H sodium chloride (NS) flush 5-40 mL  5-40 mL IntraVENous PRN    acetaminophen (TYLENOL) tablet 650 mg  650 mg Oral Q6H PRN    Or    acetaminophen (TYLENOL) suppository 650 mg  650 mg Rectal Q6H PRN    polyethylene glycol (MIRALAX) packet 17 g  17 g Oral DAILY PRN    ondansetron (ZOFRAN ODT) tablet 4 mg  4 mg Oral Q8H PRN    Or    ondansetron (ZOFRAN) injection 4 mg  4 mg IntraVENous Q6H PRN    heparin (porcine) injection 5,000 Units  5,000 Units SubCUTAneous Q8H    L.acidophilus-paracasei-S.thermophil-bifidobacter (RISAQUAD) 8 billion cell capsule  1 Capsule Oral DAILY    nicotine (NICODERM CQ) 21 mg/24 hr patch 1 Patch  1 Patch TransDERmal DAILY     ______________________________________________________________________  EXPECTED LENGTH OF STAY: 4d 16h  ACTUAL LENGTH OF STAY:          4                 Renuka Womack MD

## 2022-09-03 NOTE — PROGRESS NOTES
Problem: Falls - Risk of  Goal: *Absence of Falls  Description: Document Fern [place]amo Fall Risk and appropriate interventions in the flowsheet. Outcome: Progressing Towards Goal  Note: Fall Risk Interventions:  Mobility Interventions: Bed/chair exit alarm, Patient to call before getting OOB, Communicate number of staff needed for ambulation/transfer    Mentation Interventions: Bed/chair exit alarm, Door open when patient unattended, Reorient patient, Update white board    Medication Interventions: Patient to call before getting OOB, Teach patient to arise slowly, Evaluate medications/consider consulting pharmacy, Bed/chair exit alarm    Elimination Interventions: Call light in reach, Bed/chair exit alarm, Patient to call for help with toileting needs      Problem: Pressure Injury - Risk of  Goal: *Prevention of pressure injury  Description: Document Chandana Scale and appropriate interventions in the flowsheet.   Outcome: Progressing Towards Goal  Note: Pressure Injury Interventions:  Sensory Interventions: Keep linens dry and wrinkle-free, Maintain/enhance activity level, Minimize linen layers    Moisture Interventions: Absorbent underpads, Limit adult briefs, Minimize layers    Activity Interventions: Increase time out of bed    Mobility Interventions: Assess need for specialty bed, Pressure redistribution bed/mattress (bed type)    Nutrition Interventions: Document food/fluid/supplement intake, Offer support with meals,snacks and hydration    Friction and Shear Interventions: Minimize layers    Problem: Hypotension  Goal: *Blood pressure within specified parameters  Outcome: Progressing Towards Goal  Goal: *Fluid volume balance  Outcome: Progressing Towards Goal  Goal: *Labs within defined limits  Outcome: Progressing Towards Goal     Problem: General Medical Care Plan  Goal: *Vital signs within specified parameters  Outcome: Progressing Towards Goal  Goal: *Labs within defined limits  Outcome: Progressing Towards Goal  Goal: *Absence of infection signs and symptoms  Outcome: Progressing Towards Goal  Goal: *Optimal pain control at patient's stated goal  Outcome: Progressing Towards Goal  Goal: *Skin integrity maintained  Outcome: Progressing Towards Goal  Goal: *Fluid volume balance  Outcome: Progressing Towards Goal  Goal: *Optimize nutritional status  Outcome: Progressing Towards Goal  Goal: *Anxiety reduced or absent  Outcome: Progressing Towards Goal     Problem:  Body Temperature -  Risk of, Imbalanced  Goal: *Absence of cold stress or hypothermia signs and symptoms  Outcome: Progressing Towards Goal

## 2022-09-03 NOTE — PROGRESS NOTES
[facility], MD, FACP, Ayesha Estelle Fernandez, [place]      EDITH Pete, [facility]     Argent[place] HACKETT Bridger, [facility]   Ruthanna Opitz, FNPORTER-WARREN Lott, [facility]       Eleonora MartinRehoboth [facility] 136    at [address], [address] Remington Watson  22.    [phone]    FAX: [address] [place] [address] Drive, [address], [address]    [phone]    FAX: [phone]       HEPATOLOGY PROGRESS NOTE  The patient is a 65 y.o.  female with a multi-decade history of consuming 1 bottle of wine per night. She was told by per medical provider in the past to cut back on alcohol use. She developed jaundice and scleral icterus 12 days ago. She has developed weakness and is unable to walk. She lives alone and has no family in the area. This is her first ever hospitalization for alcohol related illness. In the ED Laboratory studies were significant for:    WBC 29.9, HB 9.8 gms, , Sna 129, Scr 4.3 mg, AST 91, ALT 35, , TBILI 29.7 mg, INR 1.8, Sammonia 11,     Imaging of the liver with CT scan demonstrated cirrhosis with a large volume of ascites. I spoke with sister who is in [facility] and plans to visit next week. She was a successful  who retired in her 45s and has \"burned through all of her money and now has nothing\"    Hospital Course:  No steroids for severe alcoholic hepatitis because of infection risk. She is too muscle wasted and has and RANDY  Paracentesis catheter placed and is draining ascites. Scr has not coming down with IV albumin  U/O is good 400-750 cc per day.       Feeding tube has been placed  and she has been started on tube feedings  After discussion with sister she has been made DNR which is appropriate    Hepatology Plan:  Monitor and give time to see if liver starts and her physical condition starts to improve with nutritional support    ASSESSMENT AND PLAN:  Cirrhosis  The diagnosis of cirrhosis is based upon imaging, laboratory studies, complications of cirrhosis. Cirrhosis is secondary to alcohol. The CTP is 10. Child class C. The MELD score is 39. Based upon the MELD and CTP scores the patient has a mortality of about 50-70% within the next 90 days   She is not a candidate for LT at this time because of severe malnutrition and advanced muscle wasting. Alcohol liver disease  Suspect the patient has alcohol induced liver disease based upon a history of consuming significant alcohol on a daily basis for many years, pattern of AST>ALT,   Serologies for other causes of chronic liver disease need to be obtained at some point    Ascites   Ascites developed for the first time in 8/2022. Paracentesis catheter placed and >5L ascites drained so far. Continue IV albumin at usual dose    Screening for Esophageal varices   The patient has not had an EGD to screen for varices. Will perform EGD at some point during the hospitalization. Not bleeding. Not top priority. Risk of sedation given severe malnutrition and muscle wasting is considerable. Hepatic encephalopathy   Overt HE has not developed to date. There is no reason for treatment with lactulose of xifaxan  There is no reason to give her diarrhea. There is no need to restrict dietary protein at this time. Coagulopathy  This is secondary to cirrhosis, malnutrition,   Will treat with 3 doses of Vitamin K over 3 days. There is no evidence of bleeding. No need for FFP at this time.     Malnutrition/muscle wasting  The patient has severe protein-calorie malnutrition with severe muscle wasting of the upper extremities, lower extremities, facial muscles  Malnutrition and muscle wasting is due to Alcohol abuse and poor caloric intake, cirrhosis and poor caloric utilization, refractory ascites and inability to eat sufficient calories,     The patient needs tube feedings. It is impossible for her to get any significant amount of calories by eating   Consult nutrition service for formula and to guide volume after feeding tube placed. PHYSICAL EXAMINATION:  VS: per nursing note  General:  Ill appearing  Eyes:  Sclera deeply icteric  ENT:  No oral lesions. Thyroid normal.  Nodes:  No adenopathy. Skin:  Numerous spider angiomata. Jaundice. Respiratory:  Lungs clear to auscultation. Cardiovascular:  Regular heart rate. Abdomen:  Distended with obvious ascites. Extremities:  No lower extremity edema. Severe muscle wasting. Neurologic:  Alert and oriented. Cranial nerves grossly intact. No asterixis. LABORATORY:   Latest Reference Range & Units 8/31/22 04:48 9/1/22 03:52 9/2/22 03:24   WBC 3.6 - 11.0 K/uL 16.7 (H)  18.8 (H)   HGB 11.5 - 16.0 g/dL 7.8 (L)  6.7 (L)   PLATELET 403 - 784 K/uL 222  154   INR 0.9 - 1.1   2.1 (H)  2.1 (H)   Sodium 136 - 145 mmol/L 131 (L) 133 (L) 134 (L)   Potassium 3.5 - 5.1 mmol/L 3.6 3.3 (L) 3.7   Chloride 97 - 108 mmol/L 101 104 103   CO2 21 - 32 mmol/L 16 (L) 14 (LL) 17 (L)   Glucose 65 - 100 mg/dL 74 64 (L) 62 (L)   BUN 6 - 20 MG/DL 69 (H) 70 (H) 69 (H)   Creatinine 0.55 - 1.02 MG/DL 4.45 (H) 4.63 (H) 4.55 (H)   Calcium 8.5 - 10.1 MG/DL 8.2 (L) 8.0 (L) 8.3 (L)   Phosphorus 2.6 - 4.7 MG/DL  3.8 3.1   Magnesium 1.6 - 2.4 mg/dL   1.9   Bilirubin, total 0.2 - 1.0 MG/DL 27.4 (H)     Albumin 3.5 - 5.0 g/dL 2.6 (L) 3.2 (L) 3.5   ALT 12 - 78 U/L 25     AST 15 - 37 U/L 70 (H)     Alk.  phosphatase 45 - 117 U/L 163 (H)     (LL): Data is critically low  (H): Data is abnormally high  (L): Data is abnormally low      Alberto Horan MD  [facility] [address] [facility] 22.  [phone]  [address] [place] [address]

## 2022-09-03 NOTE — PROGRESS NOTES
Renal Progress Note    NAME:  Angelica Mukherjee   :   1960   MRN:   [id number]     Date/Time:  9/3/2022 12:09 PM  Subjective:       , feels ok, no N/v/SOB. Worry about her pets   seen and examined, has no complaint, not oriented to place or time, had ascitis drain placed by IR, 3 lit drained sofar, 400 ml uo   more sleepy and less interactive, does not answer the questions. Oliguric, no significant change in cr. Possibly HRS. WBC still elevated. Has NGT on TF  9/3 awake, weak, denies pain, NGT is out and will be replaced.  Had 600 ml urine/o, BP better, cr stable        Medications reviewed:  Current Facility-Administered Medications   Medication Dose Route Frequency    potassium chloride 10 mEq in 100 ml IVPB  10 mEq IntraVENous Q1H    midodrine (PROAMATINE) tablet 15 mg  15 mg Oral Q8H    octreotide (SANDOSTATIN) 500 mcg in 0.9% sodium chloride 500 mL infusion  50 mcg/hr IntraVENous CONTINUOUS    sodium bicarbonate tablet 650 mg  650 mg Oral BID    0.9% sodium chloride infusion 250 mL  250 mL IntraVENous PRN    lactulose (CHRONULAC) 10 gram/15 mL solution 45 mL  30 g Oral BID    albumin human 25% (BUMINATE) solution 25 g  25 g IntraVENous Q6H    [Held by provider] oxyCODONE IR (ROXICODONE) tablet 5 mg  5 mg Oral Q4H PRN    cefTRIAXone (ROCEPHIN) 2 g in 0.9% sodium chloride 20 mL IV syringe  2 g IntraVENous Q24H    ARIPiprazole (ABILIFY) tablet 2 mg  2 mg Oral DAILY    buPROPion SR (WELLBUTRIN SR) tablet 150 mg  150 mg Oral DAILY    [Held by provider] LORazepam (ATIVAN) tablet 1 mg  1 mg Oral BID PRN    pantoprazole (PROTONIX) 40 mg in 0.9% sodium chloride 10 mL injection  40 mg IntraVENous ACB    sodium chloride (NS) flush 5-40 mL  5-40 mL IntraVENous Q8H    sodium chloride (NS) flush 5-40 mL  5-40 mL IntraVENous PRN    acetaminophen (TYLENOL) tablet 650 mg  650 mg Oral Q6H PRN    Or    acetaminophen (TYLENOL) suppository 650 mg  650 mg Rectal Q6H PRN    polyethylene glycol (MIRALAX) packet 17 g  17 g Oral DAILY PRN    ondansetron (ZOFRAN ODT) tablet 4 mg  4 mg Oral Q8H PRN    Or    ondansetron (ZOFRAN) injection 4 mg  4 mg IntraVENous Q6H PRN    heparin (porcine) injection 5,000 Units  5,000 Units SubCUTAneous Q8H    L.acidophilus-paracasei-S.thermophil-bifidobacter (RISAQUAD) 8 billion cell capsule  1 Capsule Oral DAILY    nicotine (NICODERM CQ) 21 mg/24 hr patch 1 Patch  1 Patch TransDERmal DAILY        Objective:   Vitals:  Visit Vitals  BP (!) 110/55   Pulse 73   Temp 98.3 °F (36.8 °C)   Resp 18   Ht 5' 3\" (1.6 m)   Wt 48.1 kg (106 lb)   SpO2 97%   BMI 18.78 kg/m²     Temp (24hrs), Av.1 °F (36.7 °C), Min:98 °F (36.7 °C), Max:98.3 °F (36.8 °C)      O2 Device: None (Room air)    Last 24hr Input/Output:    Intake/Output Summary (Last 24 hours) at 9/3/2022 7925  Last data filed at 9/3/2022 0430  Gross per 24 hour   Intake 955 ml   Output 3225 ml   Net -2270 ml          Physical Exam:  General: Jaundiced, awake, not interactive  HEENT:  sclerae icteric, NGT is out  Neck: Supple  Lungs : Clears to auscultation Bilaterally, Normal respiratory effort  CVS: RRR, S1 S2 normal, No rub, no  LE edema  Abdomen: Soft, Non tender, distended,  Ascitis+, paredes, abdomianl drain+  Extremities: No edema  Skin: jaundice  Neurologic: awake    Lab Data Reviewed:    Recent Results (from the past 24 hour(s))   AMMONIA    Collection Time: 22  9:58 AM   Result Value Ref Range    Ammonia, plasma 53 (H) <32 UMOL/L   RBC, ALLOCATE    Collection Time: 22 10:00 AM   Result Value Ref Range    HISTORY CHECKED?  Historical check performed    GLUCOSE, POC    Collection Time: 22  1:43 PM   Result Value Ref Range    Glucose (POC) 154 (H) 65 - 117 mg/dL    Performed by Mayra Andrade    HGB & HCT    Collection Time: 22  6:01 PM   Result Value Ref Range    HGB 8.9 (L) 11.5 - 16.0 g/dL    HCT 24.7 (L) 35.0 - 47.0 %   GLUCOSE, POC    Collection Time: 22 12:06 AM   Result Value Ref Range    Glucose (POC) 171 (H) 65 - 117 mg/dL    Performed by Reg Blake RN    RENAL FUNCTION PANEL    Collection Time: 09/03/22  5:18 AM   Result Value Ref Range    Sodium 137 136 - 145 mmol/L    Potassium 3.0 (L) 3.5 - 5.1 mmol/L    Chloride 105 97 - 108 mmol/L    CO2 20 (L) 21 - 32 mmol/L    Anion gap 12 5 - 15 mmol/L    Glucose 142 (H) 65 - 100 mg/dL    BUN 67 (H) 6 - 20 MG/DL    Creatinine 4.44 (H) 0.55 - 1.02 MG/DL    BUN/Creatinine ratio 15 12 - 20      GFR est AA 12 (L) >60 ml/min/1.73m2    GFR est non-AA 10 (L) >60 ml/min/1.73m2    Calcium 9.1 8.5 - 10.1 MG/DL    Phosphorus 2.7 2.6 - 4.7 MG/DL    Albumin 3.8 3.5 - 5.0 g/dL   CBC WITH AUTOMATED DIFF    Collection Time: 09/03/22  5:18 AM   Result Value Ref Range    WBC 21.4 (H) 3.6 - 11.0 K/uL    RBC 2.89 (L) 3.80 - 5.20 M/uL    HGB 9.9 (L) 11.5 - 16.0 g/dL    HCT 28.0 (L) 35.0 - 47.0 %    MCV 96.9 80.0 - 99.0 FL    MCH 34.3 (H) 26.0 - 34.0 PG    MCHC 35.4 30.0 - 36.5 g/dL    RDW 21.2 (H) 11.5 - 14.5 %    PLATELET 510 (L) 516 - 400 K/uL    MPV 10.8 8.9 - 12.9 FL    NRBC 0.0 0  WBC    ABSOLUTE NRBC 0.00 0.00 - 0.01 K/uL    NEUTROPHILS PENDING %    LYMPHOCYTES PENDING %    MONOCYTES PENDING %    EOSINOPHILS PENDING %    BASOPHILS PENDING %    IMMATURE GRANULOCYTES PENDING %    ABS. NEUTROPHILS PENDING K/UL    ABS. LYMPHOCYTES PENDING K/UL    ABS. MONOCYTES PENDING K/UL    ABS. EOSINOPHILS PENDING K/UL    ABS. BASOPHILS PENDING K/UL    ABS. IMM. GRANS. PENDING K/UL    DF PENDING          Assessment/Plan:   Principal Problem:    Alcoholic cirrhosis of liver with ascites ([address] (8/30/2022)    Active Problems:    Severe protein-calorie malnutrition ([facility] (8/31/2022)     Acute kidney injury -cr 0.4 in early August,   -RANDY/possible ATN due to intravascular volume depletion in setting of poor po intake, hypotension /third spacing and possible sepsis/NSAIDs. Hepatorenal in DDx . Low FeNa-low urine output  -no improvement in renal function with IVF/albumin, midodrine. UO still low. Possible HRS.  Not a candidate liver transplant Per Dr Stephen Parker. Not a candidate for RRT. -c/wMidodrine 15 mg tid  -c/w octreotide  -c/w with IV albumin  -c/w  po bicarb  -c/w tube feeding  -strict Is and Os  -avoid nephrotoxins, IV contrast/NSAIDs  -dose meds per GFR  -f/up  renal function     Hypotension-with h/o HTN on Coreg, due to effective intravascular volume depletion  -on prednisone per med list, but patient denies taking that. Although she is not fully oriented.   -midodrine     Hyponatremia, due to liver cirrhosis and volume depletion, chronic since early August  -improving with IVF  -Avoid rapid correction  -improved    Hypokalemia   -replete     Alcoholic liver cirrhosis-decompensated. -Hypoalbuminemia/ascitis/coagulopathy-nl ammonia  -hepatology following-not a candidate for liver transplant.  Poor prognosis  -ammonia higher, on lactulose    Anemia  -high iron stores  -Hb has dropped, might be due to hydration, r/o bleed  -transfuse for Hb<7    On Rocephin  Hypoglycemia, on TF-monitor    Noted DNR now    Poor prognosis  ___________________________________________________    Total time spent with patient:  [] 15   [] 25   [x] 35   []  __ minutes    [] Critical Care Provided    Care Plan discussed with:RN    [x] Patient   [] Family    [] Care Manager   [] Consultant/Specialist :      []   >50% of visit spent in counseling and coordination of care   (Discussed [] CODE status,  [] Care Plan, [] D/C Planning)    ___________________________________________________    Attending Physician: Maty Day MD    [address] [place] [address]

## 2022-09-04 LAB
ALBUMIN SERPL-MCNC: 4.1 G/DL (ref 3.5–5)
ANION GAP SERPL CALC-SCNC: 11 MMOL/L (ref 5–15)
APPEARANCE UR: ABNORMAL
BACTERIA SPEC CULT: NORMAL
BACTERIA URNS QL MICRO: ABNORMAL /HPF
BILIRUB UR QL CFM: POSITIVE
BUN SERPL-MCNC: 70 MG/DL (ref 6–20)
BUN/CREAT SERPL: 19 (ref 12–20)
CALCIUM SERPL-MCNC: 9.4 MG/DL (ref 8.5–10.1)
CHLORIDE SERPL-SCNC: 108 MMOL/L (ref 97–108)
CO2 SERPL-SCNC: 20 MMOL/L (ref 21–32)
COLOR UR: ABNORMAL
CREAT SERPL-MCNC: 3.71 MG/DL (ref 0.55–1.02)
EPITH CASTS URNS QL MICRO: ABNORMAL /LPF
GLUCOSE SERPL-MCNC: 102 MG/DL (ref 65–100)
GLUCOSE UR STRIP.AUTO-MCNC: NEGATIVE MG/DL
GRAM STN SPEC: NORMAL
GRAM STN SPEC: NORMAL
HGB UR QL STRIP: ABNORMAL
HYALINE CASTS URNS QL MICRO: ABNORMAL /LPF (ref 0–5)
KETONES UR QL STRIP.AUTO: NEGATIVE MG/DL
LEUKOCYTE ESTERASE UR QL STRIP.AUTO: ABNORMAL
MAGNESIUM SERPL-MCNC: 1.9 MG/DL (ref 1.6–2.4)
NITRITE UR QL STRIP.AUTO: POSITIVE
OTHER,OTHU: ABNORMAL
PH UR STRIP: 5.5 [PH] (ref 5–8)
PHOSPHATE SERPL-MCNC: 3.1 MG/DL (ref 2.6–4.7)
POTASSIUM SERPL-SCNC: 3.6 MMOL/L (ref 3.5–5.1)
PROT UR STRIP-MCNC: 100 MG/DL
RBC #/AREA URNS HPF: ABNORMAL /HPF (ref 0–5)
SERVICE CMNT-IMP: NORMAL
SODIUM SERPL-SCNC: 139 MMOL/L (ref 136–145)
SP GR UR REFRACTOMETRY: 1.02 (ref 1–1.03)
UA: UC IF INDICATED,UAUC: ABNORMAL
UROBILINOGEN UR QL STRIP.AUTO: 1 EU/DL (ref 0.2–1)
WBC URNS QL MICRO: ABNORMAL /HPF (ref 0–4)
YEAST BUDDING URNS QL: PRESENT
YEAST URNS QL MICRO: PRESENT

## 2022-09-04 PROCEDURE — 83735 ASSAY OF MAGNESIUM: CPT

## 2022-09-04 PROCEDURE — 74011250637 HC RX REV CODE- 250/637: Performed by: HOSPITALIST

## 2022-09-04 PROCEDURE — 74011250637 HC RX REV CODE- 250/637: Performed by: INTERNAL MEDICINE

## 2022-09-04 PROCEDURE — 94760 N-INVAS EAR/PLS OXIMETRY 1: CPT

## 2022-09-04 PROCEDURE — 65660000001 HC RM ICU INTERMED STEPDOWN

## 2022-09-04 PROCEDURE — 74011250636 HC RX REV CODE- 250/636: Performed by: INTERNAL MEDICINE

## 2022-09-04 PROCEDURE — 36591 DRAW BLOOD OFF VENOUS DEVICE: CPT

## 2022-09-04 PROCEDURE — P9047 ALBUMIN (HUMAN), 25%, 50ML: HCPCS | Performed by: INTERNAL MEDICINE

## 2022-09-04 PROCEDURE — 77030005513 HC CATH URETH FOL11 MDII -B

## 2022-09-04 PROCEDURE — C9113 INJ PANTOPRAZOLE SODIUM, VIA: HCPCS | Performed by: INTERNAL MEDICINE

## 2022-09-04 PROCEDURE — 74011636637 HC RX REV CODE- 636/637: Performed by: INTERNAL MEDICINE

## 2022-09-04 PROCEDURE — 81001 URINALYSIS AUTO W/SCOPE: CPT

## 2022-09-04 PROCEDURE — 74011000250 HC RX REV CODE- 250: Performed by: INTERNAL MEDICINE

## 2022-09-04 PROCEDURE — 87040 BLOOD CULTURE FOR BACTERIA: CPT

## 2022-09-04 PROCEDURE — 80069 RENAL FUNCTION PANEL: CPT

## 2022-09-04 PROCEDURE — 99233 SBSQ HOSP IP/OBS HIGH 50: CPT | Performed by: INTERNAL MEDICINE

## 2022-09-04 RX ORDER — POTASSIUM CHLORIDE 7.45 MG/ML
10 INJECTION INTRAVENOUS
Status: COMPLETED | OUTPATIENT
Start: 2022-09-04 | End: 2022-09-04

## 2022-09-04 RX ADMIN — OCTREOTIDE ACETATE 50 MCG/HR: 500 INJECTION, SOLUTION INTRAVENOUS; SUBCUTANEOUS at 10:32

## 2022-09-04 RX ADMIN — HEPARIN SODIUM 5000 UNITS: 5000 INJECTION INTRAVENOUS; SUBCUTANEOUS at 09:55

## 2022-09-04 RX ADMIN — SODIUM BICARBONATE: 84 INJECTION, SOLUTION INTRAVENOUS at 10:32

## 2022-09-04 RX ADMIN — OCTREOTIDE ACETATE 50 MCG/HR: 500 INJECTION, SOLUTION INTRAVENOUS; SUBCUTANEOUS at 21:47

## 2022-09-04 RX ADMIN — Medication 1 CAPSULE: at 09:55

## 2022-09-04 RX ADMIN — SODIUM CHLORIDE, PRESERVATIVE FREE 10 ML: 5 INJECTION INTRAVENOUS at 14:37

## 2022-09-04 RX ADMIN — POTASSIUM CHLORIDE 10 MEQ: 7.46 INJECTION, SOLUTION INTRAVENOUS at 11:44

## 2022-09-04 RX ADMIN — MIDODRINE HYDROCHLORIDE 15 MG: 5 TABLET ORAL at 06:00

## 2022-09-04 RX ADMIN — SODIUM BICARBONATE 650 MG: 650 TABLET ORAL at 18:41

## 2022-09-04 RX ADMIN — PANTOPRAZOLE SODIUM 40 MG: 40 INJECTION, POWDER, FOR SOLUTION INTRAVENOUS at 09:55

## 2022-09-04 RX ADMIN — SODIUM CHLORIDE, PRESERVATIVE FREE 10 ML: 5 INJECTION INTRAVENOUS at 21:48

## 2022-09-04 RX ADMIN — ARIPIPRAZOLE 2 MG: 2 TABLET ORAL at 09:55

## 2022-09-04 RX ADMIN — ALBUMIN (HUMAN) 25 G: 0.25 INJECTION, SOLUTION INTRAVENOUS at 18:41

## 2022-09-04 RX ADMIN — LACTULOSE 30 ML: 20 SOLUTION ORAL at 10:34

## 2022-09-04 RX ADMIN — HEPARIN SODIUM 5000 UNITS: 5000 INJECTION INTRAVENOUS; SUBCUTANEOUS at 16:05

## 2022-09-04 RX ADMIN — ALBUMIN (HUMAN) 25 G: 0.25 INJECTION, SOLUTION INTRAVENOUS at 14:37

## 2022-09-04 RX ADMIN — POTASSIUM CHLORIDE 10 MEQ: 7.46 INJECTION, SOLUTION INTRAVENOUS at 09:56

## 2022-09-04 RX ADMIN — SODIUM CHLORIDE, PRESERVATIVE FREE 2 G: 5 INJECTION INTRAVENOUS at 04:05

## 2022-09-04 RX ADMIN — SODIUM BICARBONATE 650 MG: 650 TABLET ORAL at 09:55

## 2022-09-04 RX ADMIN — SODIUM CHLORIDE, PRESERVATIVE FREE 10 ML: 5 INJECTION INTRAVENOUS at 06:00

## 2022-09-04 RX ADMIN — MIDODRINE HYDROCHLORIDE 15 MG: 5 TABLET ORAL at 21:48

## 2022-09-04 RX ADMIN — ALBUMIN (HUMAN) 25 G: 0.25 INJECTION, SOLUTION INTRAVENOUS at 06:00

## 2022-09-04 RX ADMIN — MIDODRINE HYDROCHLORIDE 15 MG: 5 TABLET ORAL at 16:05

## 2022-09-04 RX ADMIN — BUPROPION HYDROCHLORIDE 150 MG: 150 TABLET, EXTENDED RELEASE ORAL at 09:55

## 2022-09-04 NOTE — PROGRESS NOTES
2000 Attempted to place NGT - patient could not tolerate- gagging. 2200 Pt resting    0000 No complaints of pain    0400 Large BM - bathed and linen change    0730 - Bedside shift change report given to [address] [place] [address] (oncoming nurse) by Bernice Cooney (offgoing nurse). Report included the following information SBAR, Kardex, and Recent Results.

## 2022-09-04 NOTE — PROGRESS NOTES
[facility] Adult  Hospitalist Group                                                                                          Hospitalist Progress Note  Glenn Gillespie MD  Answering service: [phone] from in house phone        Date of Service:  2022  NAME:  Phyllis Shaw  :  1960  MRN:  [id number]      Admission Summary:   Per H&P, This is a 59-year-old woman with a past medical history significant for hypertension, alcoholic liver cirrhosis, who presented at the emergency room with abdominal pain. The patient presented at [facility] Emergency Room. When the patient arrived at the emergency room, the patient was found to have decompensation of liver cirrhosis. The hospitalist service at [facility] was asked to directly admit the patient to [facility], so that the patient can be seen by the hepatologist.  The patient came to the emergency room at [facility], because of lack of bed on the on the medical floor. When the patient arrived at the emergency room, the patient's lab work shows significant abnormality. She was subsequently referred to the hospitalist service for admission. No record of prior admission to this hospital.  It was reported that the patient underwent abdominal paracentesis before coming to [facility].  The amount of fluid removed is not known. The patient's abdominal pain is diffuse in location, dull ache, constant, 7/10 in severity with no known aggravating or relieving factors associated with nausea, but no vomiting, no diarrhea, and no constipation. Interval history / Subjective:   More alert today but still confused. Wants to eat. Denies pain, n/v or dyspnea. NGT could not be placed yesterday.      Assessment & Plan:        Decompensated alcoholic cirrhosis of the liver  S/p therapeutic paracentesis  She did have EGD on  at 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 Serum ammonia was mildly elevated. Likely developed HE. Will continue lactulose at reduced dose. Severe protein calorie malnutrition  Muscle wasting, bitemporal wasting. Due to her disease process  Per hepatology will start tube feeds  Consulting with RD  DHT advanced to duodenum 9/1 and resumed TFs - FT came out overnight, could not be replaced. However more alert now so will start full liquid diet and advance as tolerated    Acute kidney injury  Suspected ATN, IVVD due to poor PO intake, hypotension, NSAID use. HRS on ddx. No improvement with IV albumin but continue the same. IVF resumed 9/4 due to NGT being out; if PO  intake is good, we can d/c this    Hypotension  Continue midodrine, IV albumin and IV fluids as above    Anemia  Iron studies consistent with chronic disease  Transfuse PRN - unit ordered for 9/2  Vitamin K per hepatology for coagulopathy    Persistent leukocytosis: unclear etiology, no evidence of infection clinically. Afebrile. Has been on empiric IV ceftriaxone for 6 days, will d/c this and monitor. Will obtain urine and blood cultures. History of HTN    This patient is critically-ill and at risk for decline due to: decompensated cirrhosis, renal failure and hypotension. CCT 35 minutes. Code status: DNR - continue full current care otherwise but do not escalate (if pt rapidly deteriorates make comfort care and inform sister over the phone 24/7). See 9/2 PN for my discussion with her.   Prophylaxis: SCD  Care Plan discussed with: RN     Hospital Problems  Date Reviewed: 8/30/2022            Codes Class Noted POA    Severe protein-calorie malnutrition ([facility] (Chronic) ICD-10-CM: B16  ICD-9-CM: 332  8/31/2022 Yes        * (Principal) Alcoholic cirrhosis of liver with ascites ([address] ICD-10-CM: K70.31  ICD-9-CM: 571.2  8/30/2022 Yes       Review of Systems:   Review of systems not obtained due to patient factors. Vital Signs:    Last 24hrs VS reviewed since prior progress note. Most recent are:  Visit Vitals  BP (!) 120/55 (BP 1 Location: Left upper arm, BP Patient Position: At rest)   Pulse 71   Temp 99.2 °F (37.3 °C)   Resp 20   Ht 5' 3\" (1.6 m)   Wt 48.1 kg (106 lb)   SpO2 100%   BMI 18.78 kg/m²         Intake/Output Summary (Last 24 hours) at 9/4/2022 1018  Last data filed at 9/4/2022 0436  Gross per 24 hour   Intake 700 ml   Output 725 ml   Net -25 ml          Physical Examination:     I had a face to face encounter with this patient and independently examined them on 9/4/2022 as outlined below:          Constitutional:  NAD, cachectic, chronically-ill appearing   ENT:  Oral mucosa moist, oropharynx benign. Icteric sclerae    Resp:  CTA bilaterally. No wheezing/rhonchi/rales. No accessory muscle use.     CV:  Regular rhythm, normal rate, soft systolic murmur     GI: Soft, mildly distended, NT    Musculoskeletal:    No edema, warm, 2+ pulses throughout jaundiced    Neurologic:  Grossly non-focal, more alert today but remains drowsy, oriented to self     Skin: jaundiced       Data Review:    Review and/or order of clinical lab test  Review and/or order of tests in the radiology section of [facility]      Labs:     Recent Labs     09/03/22  0518 09/02/22  1801 09/02/22  0324   WBC 21.4*  --  18.8*   HGB 9.9* 8.9* 6.7*   HCT 28.0* 24.7* 19.1*   *  --  154       Recent Labs     09/04/22  0328 09/03/22  0518 09/02/22  0324    137 134*   K 3.6 3.0* 3.7    105 103   CO2 20* 20* 17*   BUN 70* 67* 69*   CREA 3.71* 4.44* 4.55*   * 142* 62*   CA 9.4 9.1 8.3*   MG 1.9 1.9 1.9   PHOS 3.1 2.7 3.1       Recent Labs     09/04/22  0328 09/03/22  0518 09/02/22  0324   ALB 4.1 3.8 3.5       Recent Labs     09/02/22  0324   INR 2.1*   PTP 20.5*        No results for input(s): FE, TIBC, PSAT, FERR in the last 72 hours. Lab Results   Component Value Date/Time    Folate 14.7 08/30/2022 01:52 AM        No results for input(s): PH, PCO2, PO2 in the last 72 hours. No results for input(s): CPK, CKNDX, TROIQ in the last 72 hours.     No lab exists for component: CPKMB  No results found for: CHOL, CHOLX, CHLST, CHOLV, HDL, HDLP, LDL, LDLC, DLDLP, TGLX, TRIGL, TRIGP, CHHD, CHHDX  Lab Results   Component Value Date/Time    Glucose (POC) 171 (H) 09/03/2022 12:06 AM    Glucose (POC) 154 (H) 09/02/2022 01:43 PM    Glucose (POC) 113 09/02/2022 06:11 AM     Lab Results   Component Value Date/Time    Color DARK YELLOW 08/30/2022 12:08 PM    Appearance TURBID (A) 08/30/2022 12:08 PM    Specific gravity 1.018 08/30/2022 12:08 PM    pH (UA) 5.5 08/30/2022 12:08 PM    Protein 30 (A) 08/30/2022 12:08 PM    Glucose Negative 08/30/2022 12:08 PM    Ketone Negative 08/30/2022 12:08 PM    Urobilinogen 1.0 08/30/2022 12:08 PM    Nitrites Positive (A) 08/30/2022 12:08 PM    Leukocyte Esterase MODERATE (A) 08/30/2022 12:08 PM    Epithelial cells FEW 08/30/2022 12:08 PM    Bacteria Negative 08/30/2022 12:08 PM    WBC 0-4 08/30/2022 12:08 PM    RBC 20-50 08/30/2022 12:08 PM         Medications Reviewed:     Current Facility-Administered Medications   Medication Dose Route Frequency    sodium bicarbonate (8.4%) 150 mEq in dextrose 5% 1,000 mL infusion   IntraVENous CONTINUOUS    potassium chloride 10 mEq in 100 ml IVPB  10 mEq IntraVENous Q1H    lactulose (CHRONULAC) 10 gram/15 mL solution 30 mL  20 g Oral DAILY    midodrine (PROAMATINE) tablet 15 mg  15 mg Oral Q8H    octreotide (SANDOSTATIN) 500 mcg in 0.9% sodium chloride 500 mL infusion  50 mcg/hr IntraVENous CONTINUOUS    sodium bicarbonate tablet 650 mg  650 mg Oral BID    0.9% sodium chloride infusion 250 mL  250 mL IntraVENous PRN    albumin human 25% (BUMINATE) solution 25 g  25 g IntraVENous Q6H    [Held by provider] oxyCODONE IR (ROXICODONE) tablet 5 mg  5 mg Oral Q4H PRN cefTRIAXone (ROCEPHIN) 2 g in 0.9% sodium chloride 20 mL IV syringe  2 g IntraVENous Q24H    ARIPiprazole (ABILIFY) tablet 2 mg  2 mg Oral DAILY    buPROPion SR (WELLBUTRIN SR) tablet 150 mg  150 mg Oral DAILY    [Held by provider] LORazepam (ATIVAN) tablet 1 mg  1 mg Oral BID PRN    pantoprazole (PROTONIX) 40 mg in 0.9% sodium chloride 10 mL injection  40 mg IntraVENous ACB    sodium chloride (NS) flush 5-40 mL  5-40 mL IntraVENous Q8H    sodium chloride (NS) flush 5-40 mL  5-40 mL IntraVENous PRN    acetaminophen (TYLENOL) tablet 650 mg  650 mg Oral Q6H PRN    Or    acetaminophen (TYLENOL) suppository 650 mg  650 mg Rectal Q6H PRN    polyethylene glycol (MIRALAX) packet 17 g  17 g Oral DAILY PRN    ondansetron (ZOFRAN ODT) tablet 4 mg  4 mg Oral Q8H PRN    Or    ondansetron (ZOFRAN) injection 4 mg  4 mg IntraVENous Q6H PRN    heparin (porcine) injection 5,000 Units  5,000 Units SubCUTAneous Q8H    L.acidophilus-paracasei-S.thermophil-bifidobacter (RISAQUAD) 8 billion cell capsule  1 Capsule Oral DAILY    nicotine (NICODERM CQ) 21 mg/24 hr patch 1 Patch  1 Patch TransDERmal DAILY     ______________________________________________________________________  EXPECTED LENGTH OF STAY: 4d 16h  ACTUAL LENGTH OF STAY:          5                 Brigid Iglesias MD

## 2022-09-04 NOTE — PROGRESS NOTES
Renal Progress Note    NAME:  Emilia Chávez   :   1960   MRN:   [id number]     Date/Time:  2022 12:09 PM  Subjective:       , feels ok, no N/v/SOB. Worry about her pets   seen and examined, has no complaint, not oriented to place or time, had ascitis drain placed by IR, 3 lit drained sofar, 400 ml uo   more sleepy and less interactive, does not answer the questions. Oliguric, no significant change in cr. Possibly HRS. WBC still elevated. Has NGT on TF  9/3 awake, weak, denies pain, NGT is out and will be replaced. Had 600 ml urine/o, BP better, cr stable   more awake and alert, interactive today, wants to eat, , renal function improving, unable to place back the NGT, still significant leukocytosis.  Denies abd pain, cough        Medications reviewed:  Current Facility-Administered Medications   Medication Dose Route Frequency    sodium bicarbonate (8.4%) 150 mEq in sterile water 1,000 mL infusion   IntraVENous CONTINUOUS    midodrine (PROAMATINE) tablet 15 mg  15 mg Oral Q8H    octreotide (SANDOSTATIN) 500 mcg in 0.9% sodium chloride 500 mL infusion  50 mcg/hr IntraVENous CONTINUOUS    sodium bicarbonate tablet 650 mg  650 mg Oral BID    0.9% sodium chloride infusion 250 mL  250 mL IntraVENous PRN    [Held by provider] lactulose (CHRONULAC) 10 gram/15 mL solution 45 mL  30 g Oral BID    albumin human 25% (BUMINATE) solution 25 g  25 g IntraVENous Q6H    [Held by provider] oxyCODONE IR (ROXICODONE) tablet 5 mg  5 mg Oral Q4H PRN    cefTRIAXone (ROCEPHIN) 2 g in 0.9% sodium chloride 20 mL IV syringe  2 g IntraVENous Q24H    ARIPiprazole (ABILIFY) tablet 2 mg  2 mg Oral DAILY    buPROPion SR (WELLBUTRIN SR) tablet 150 mg  150 mg Oral DAILY    [Held by provider] LORazepam (ATIVAN) tablet 1 mg  1 mg Oral BID PRN    pantoprazole (PROTONIX) 40 mg in 0.9% sodium chloride 10 mL injection  40 mg IntraVENous ACB    sodium chloride (NS) flush 5-40 mL  5-40 mL IntraVENous Q8H    sodium chloride (NS) flush 5-40 mL  5-40 mL IntraVENous PRN    acetaminophen (TYLENOL) tablet 650 mg  650 mg Oral Q6H PRN    Or    acetaminophen (TYLENOL) suppository 650 mg  650 mg Rectal Q6H PRN    polyethylene glycol (MIRALAX) packet 17 g  17 g Oral DAILY PRN    ondansetron (ZOFRAN ODT) tablet 4 mg  4 mg Oral Q8H PRN    Or    ondansetron (ZOFRAN) injection 4 mg  4 mg IntraVENous Q6H PRN    heparin (porcine) injection 5,000 Units  5,000 Units SubCUTAneous Q8H    L.acidophilus-paracasei-S.thermophil-bifidobacter (RISAQUAD) 8 billion cell capsule  1 Capsule Oral DAILY    nicotine (NICODERM CQ) 21 mg/24 hr patch 1 Patch  1 Patch TransDERmal DAILY        Objective:   Vitals:  Visit Vitals  BP (!) 120/55 (BP 1 Location: Left upper arm, BP Patient Position: At rest)   Pulse 72   Temp 99.2 °F (37.3 °C)   Resp 20   Ht 5' 3\" (1.6 m)   Wt 48.1 kg (106 lb)   SpO2 100%   BMI 18.78 kg/m²     Temp (24hrs), Av.9 °F (36.6 °C), Min:97.1 °F (36.2 °C), Max:99.2 °F (37.3 °C)      O2 Device: None (Room air)    Last 24hr Input/Output:    Intake/Output Summary (Last 24 hours) at 2022 0831  Last data filed at 2022 0436  Gross per 24 hour   Intake 700 ml   Output 1675 ml   Net -975 ml          Physical Exam:  General: Jaundiced, awake, not interactive  HEENT:  sclerae icteric, NGT is out  Neck: Supple  Lungs : Clears to auscultation Bilaterally, Normal respiratory effort  CVS: RRR, S1 S2 normal, No rub, no  LE edema  Abdomen: Soft, Non tender, distended,  Ascitis+, paredes, abdomianl drain+  : paredes  Extremities: No edema  Skin: jaundice  Neurologic: awake    Lab Data Reviewed:    Recent Results (from the past 24 hour(s))   RENAL FUNCTION PANEL    Collection Time: 22  3:28 AM   Result Value Ref Range    Sodium 139 136 - 145 mmol/L    Potassium 3.6 3.5 - 5.1 mmol/L    Chloride 108 97 - 108 mmol/L    CO2 20 (L) 21 - 32 mmol/L    Anion gap 11 5 - 15 mmol/L    Glucose 102 (H) 65 - 100 mg/dL    BUN 70 (H) 6 - 20 MG/DL    Creatinine 3.71 (H) 0.55 - 1.02 MG/DL    BUN/Creatinine ratio 19 12 - 20      GFR est AA 15 (L) >60 ml/min/1.73m2    GFR est non-AA 12 (L) >60 ml/min/1.73m2    Calcium 9.4 8.5 - 10.1 MG/DL    Phosphorus 3.1 2.6 - 4.7 MG/DL    Albumin 4.1 3.5 - 5.0 g/dL   MAGNESIUM    Collection Time: 09/04/22  3:28 AM   Result Value Ref Range    Magnesium 1.9 1.6 - 2.4 mg/dL         Assessment/Plan:   Principal Problem:    Alcoholic cirrhosis of liver with ascites ([facility] (8/30/2022)    Active Problems:    Severe protein-calorie malnutrition ([facility] (8/31/2022)     Acute kidney injury -cr 0.4 in early August,   -RANDY/possible ATN due to intravascular volume depletion in setting of poor po intake, hypotension /third spacing and possible sepsis/NSAIDs. Hepatorenal in DDx . Low FeNa-low urine output  -no improvement in renal function with IVF/albumin, midodrine. UO still low. Possible HRS. Not a candidate liver transplant Per Dr Jose Bro. Not a candidate for RRT. -c/wMidodrine 15 mg tid  -c/w octreotide  -c/w with IV albumin  -c/w  po bicarb if able to take po, otherwise agree with IVF with bicarb at 50 ml/h  -c/w tube feeding  -strict Is and Os  -avoid nephrotoxins, IV contrast/NSAIDs  -dose meds per GFR  -f/up  renal function     Hypotension-with h/o HTN on Coreg, due to effective intravascular volume depletion  -on prednisone per med list, but patient denies taking that. Although she is not fully oriented.   -midodrine     Hyponatremia, due to liver cirrhosis and volume depletion, chronic since early August  -improving with IVF  -Avoid rapid correction  -improved    Hypokalemia   -replete prn    Alcoholic liver cirrhosis-decompensated. -Hypoalbuminemia/ascitis/coagulopathy-nl ammonia  -hepatology following-not a candidate for liver transplant.  Poor prognosis  -ammonia higher, on lactulose    Anemia  -high iron stores  -Hb has dropped, might be due to hydration, r/o bleed  -transfuse for Hb<7    Leukocytosis: On Rocephin-source of infection?   Hypoglycemia, off TF-monitor-change IVF to D5 with bicarb    Noted DNR now    Poor prognosis  ___________________________________________________    Total time spent with patient:  [] 15   [] 25   [x] 35   []  __ minutes    [] Critical Care Provided    Care Plan discussed with:RN and Dr Thom Cool    [x] Patient   [] Family    [] Care Manager   [] Consultant/Specialist :      []   >50% of visit spent in counseling and coordination of care   (Discussed [] CODE status,  [] Care Plan, [] D/C Planning)    ___________________________________________________    Attending Physician: Jose Barnes MD    [facility]

## 2022-09-05 LAB
ALBUMIN SERPL-MCNC: 4.1 G/DL (ref 3.5–5)
ALBUMIN SERPL-MCNC: 4.2 G/DL (ref 3.5–5)
ALBUMIN/GLOB SERPL: 3.8 {RATIO} (ref 1.1–2.2)
ALP SERPL-CCNC: 100 U/L (ref 45–117)
ALT SERPL-CCNC: 14 U/L (ref 12–78)
ANION GAP SERPL CALC-SCNC: 12 MMOL/L (ref 5–15)
AST SERPL-CCNC: 45 U/L (ref 15–37)
BASOPHILS # BLD: 0.2 K/UL (ref 0–0.1)
BASOPHILS NFR BLD: 1 % (ref 0–1)
BILIRUB DIRECT SERPL-MCNC: 11.6 MG/DL (ref 0–0.2)
BILIRUB SERPL-MCNC: 19.3 MG/DL (ref 0.2–1)
BUN SERPL-MCNC: 62 MG/DL (ref 6–20)
BUN/CREAT SERPL: 20 (ref 12–20)
CALCIUM SERPL-MCNC: 9.1 MG/DL (ref 8.5–10.1)
CHLORIDE SERPL-SCNC: 108 MMOL/L (ref 97–108)
CO2 SERPL-SCNC: 18 MMOL/L (ref 21–32)
CREAT SERPL-MCNC: 3.16 MG/DL (ref 0.55–1.02)
DIFFERENTIAL METHOD BLD: ABNORMAL
EOSINOPHIL # BLD: 0.2 K/UL (ref 0–0.4)
EOSINOPHIL NFR BLD: 1 % (ref 0–7)
ERYTHROCYTE [DISTWIDTH] IN BLOOD BY AUTOMATED COUNT: 21.6 % (ref 11.5–14.5)
GLOBULIN SER CALC-MCNC: 1.1 G/DL (ref 2–4)
GLUCOSE BLD STRIP.AUTO-MCNC: 131 MG/DL (ref 65–117)
GLUCOSE SERPL-MCNC: 127 MG/DL (ref 65–100)
HCT VFR BLD AUTO: 29.3 % (ref 35–47)
HGB BLD-MCNC: 10.1 G/DL (ref 11.5–16)
IMM GRANULOCYTES # BLD AUTO: 0.4 K/UL (ref 0–0.04)
IMM GRANULOCYTES NFR BLD AUTO: 2 % (ref 0–0.5)
LYMPHOCYTES # BLD: 1.8 K/UL (ref 0.8–3.5)
LYMPHOCYTES NFR BLD: 8 % (ref 12–49)
MAGNESIUM SERPL-MCNC: 1.7 MG/DL (ref 1.6–2.4)
MCH RBC QN AUTO: 34.1 PG (ref 26–34)
MCHC RBC AUTO-ENTMCNC: 34.5 G/DL (ref 30–36.5)
MCV RBC AUTO: 99 FL (ref 80–99)
MONOCYTES # BLD: 2.7 K/UL (ref 0–1)
MONOCYTES NFR BLD: 12 % (ref 5–13)
NEUTS SEG # BLD: 16.9 K/UL (ref 1.8–8)
NEUTS SEG NFR BLD: 76 % (ref 32–75)
NRBC # BLD: 0 K/UL (ref 0–0.01)
NRBC BLD-RTO: 0 PER 100 WBC
PHOSPHATE SERPL-MCNC: 2.6 MG/DL (ref 2.6–4.7)
PLATELET # BLD AUTO: 106 K/UL (ref 150–400)
PMV BLD AUTO: 10.8 FL (ref 8.9–12.9)
POTASSIUM SERPL-SCNC: 3.5 MMOL/L (ref 3.5–5.1)
PROT SERPL-MCNC: 5.3 G/DL (ref 6.4–8.2)
RBC # BLD AUTO: 2.96 M/UL (ref 3.8–5.2)
RBC MORPH BLD: ABNORMAL
SERVICE CMNT-IMP: ABNORMAL
SODIUM SERPL-SCNC: 138 MMOL/L (ref 136–145)
WBC # BLD AUTO: 22.2 K/UL (ref 3.6–11)

## 2022-09-05 PROCEDURE — 74011250636 HC RX REV CODE- 250/636: Performed by: INTERNAL MEDICINE

## 2022-09-05 PROCEDURE — 74011250637 HC RX REV CODE- 250/637: Performed by: INTERNAL MEDICINE

## 2022-09-05 PROCEDURE — 65660000001 HC RM ICU INTERMED STEPDOWN

## 2022-09-05 PROCEDURE — 83735 ASSAY OF MAGNESIUM: CPT

## 2022-09-05 PROCEDURE — 74011250637 HC RX REV CODE- 250/637: Performed by: HOSPITALIST

## 2022-09-05 PROCEDURE — 36415 COLL VENOUS BLD VENIPUNCTURE: CPT

## 2022-09-05 PROCEDURE — 80069 RENAL FUNCTION PANEL: CPT

## 2022-09-05 PROCEDURE — 85025 COMPLETE CBC W/AUTO DIFF WBC: CPT

## 2022-09-05 PROCEDURE — 80076 HEPATIC FUNCTION PANEL: CPT

## 2022-09-05 PROCEDURE — 74011000250 HC RX REV CODE- 250: Performed by: INTERNAL MEDICINE

## 2022-09-05 PROCEDURE — 74011636637 HC RX REV CODE- 636/637: Performed by: INTERNAL MEDICINE

## 2022-09-05 PROCEDURE — C9113 INJ PANTOPRAZOLE SODIUM, VIA: HCPCS | Performed by: INTERNAL MEDICINE

## 2022-09-05 PROCEDURE — P9047 ALBUMIN (HUMAN), 25%, 50ML: HCPCS | Performed by: INTERNAL MEDICINE

## 2022-09-05 PROCEDURE — 82962 GLUCOSE BLOOD TEST: CPT

## 2022-09-05 RX ORDER — NYSTATIN 100000 [USP'U]/ML
500000 SUSPENSION ORAL 4 TIMES DAILY
Status: DISCONTINUED | OUTPATIENT
Start: 2022-09-05 | End: 2022-09-15 | Stop reason: HOSPADM

## 2022-09-05 RX ADMIN — SODIUM BICARBONATE: 84 INJECTION, SOLUTION INTRAVENOUS at 08:32

## 2022-09-05 RX ADMIN — LACTULOSE 30 ML: 20 SOLUTION ORAL at 08:34

## 2022-09-05 RX ADMIN — MIDODRINE HYDROCHLORIDE 15 MG: 5 TABLET ORAL at 06:44

## 2022-09-05 RX ADMIN — ALBUMIN (HUMAN) 25 G: 0.25 INJECTION, SOLUTION INTRAVENOUS at 11:22

## 2022-09-05 RX ADMIN — HEPARIN SODIUM 5000 UNITS: 5000 INJECTION INTRAVENOUS; SUBCUTANEOUS at 08:34

## 2022-09-05 RX ADMIN — MIDODRINE HYDROCHLORIDE 15 MG: 5 TABLET ORAL at 14:25

## 2022-09-05 RX ADMIN — SODIUM CHLORIDE, PRESERVATIVE FREE 10 ML: 5 INJECTION INTRAVENOUS at 21:16

## 2022-09-05 RX ADMIN — SODIUM CHLORIDE, PRESERVATIVE FREE 10 ML: 5 INJECTION INTRAVENOUS at 14:00

## 2022-09-05 RX ADMIN — NYSTATIN 500000 UNITS: 100000 SUSPENSION ORAL at 17:03

## 2022-09-05 RX ADMIN — MIDODRINE HYDROCHLORIDE 15 MG: 5 TABLET ORAL at 21:07

## 2022-09-05 RX ADMIN — PANTOPRAZOLE SODIUM 40 MG: 40 INJECTION, POWDER, FOR SOLUTION INTRAVENOUS at 06:44

## 2022-09-05 RX ADMIN — ALBUMIN (HUMAN) 25 G: 0.25 INJECTION, SOLUTION INTRAVENOUS at 01:27

## 2022-09-05 RX ADMIN — OCTREOTIDE ACETATE 50 MCG/HR: 500 INJECTION, SOLUTION INTRAVENOUS; SUBCUTANEOUS at 21:09

## 2022-09-05 RX ADMIN — SODIUM CHLORIDE, PRESERVATIVE FREE 10 ML: 5 INJECTION INTRAVENOUS at 06:45

## 2022-09-05 RX ADMIN — NYSTATIN 500000 UNITS: 100000 SUSPENSION ORAL at 21:09

## 2022-09-05 RX ADMIN — HEPARIN SODIUM 5000 UNITS: 5000 INJECTION INTRAVENOUS; SUBCUTANEOUS at 17:03

## 2022-09-05 RX ADMIN — BUPROPION HYDROCHLORIDE 150 MG: 150 TABLET, EXTENDED RELEASE ORAL at 08:34

## 2022-09-05 RX ADMIN — POTASSIUM BICARBONATE 20 MEQ: 782 TABLET, EFFERVESCENT ORAL at 10:11

## 2022-09-05 RX ADMIN — SODIUM BICARBONATE 650 MG: 650 TABLET ORAL at 08:34

## 2022-09-05 RX ADMIN — OCTREOTIDE ACETATE 50 MCG/HR: 500 INJECTION, SOLUTION INTRAVENOUS; SUBCUTANEOUS at 09:39

## 2022-09-05 RX ADMIN — ARIPIPRAZOLE 2 MG: 2 TABLET ORAL at 08:34

## 2022-09-05 RX ADMIN — Medication 1 CAPSULE: at 08:34

## 2022-09-05 RX ADMIN — HEPARIN SODIUM 5000 UNITS: 5000 INJECTION INTRAVENOUS; SUBCUTANEOUS at 01:27

## 2022-09-05 RX ADMIN — SODIUM BICARBONATE 650 MG: 650 TABLET ORAL at 17:03

## 2022-09-05 RX ADMIN — ALBUMIN (HUMAN) 25 G: 0.25 INJECTION, SOLUTION INTRAVENOUS at 06:44

## 2022-09-05 RX ADMIN — ALBUMIN (HUMAN) 25 G: 0.25 INJECTION, SOLUTION INTRAVENOUS at 17:03

## 2022-09-05 NOTE — PROGRESS NOTES
[facility], MD, FACP, Ayesha Estelle Fernandez, [address] EDITH Arauz [facility]     [place] HACKETT Bridger, [facility]   ERIC Juarez [facility]       Eleonora Edgar [facility] [address]    at [address], [address] [address] Watson  22.    [phone]    FAX: [address] [place] [address] Drive, [address], [address]    [phone]    FAX: [phone]       HEPATOLOGY PROGRESS NOTE  The patient is a 65 y.o.  female with a multi-decade history of consuming 1 bottle of wine per night. She was told by per medical provider in the past to cut back on alcohol use. She developed jaundice and scleral icterus 12 days ago. She has developed weakness and is unable to walk. She lives alone and has no family in the area. This is her first ever hospitalization for alcohol related illness. In the ED Laboratory studies were significant for:    WBC 29.9, HB 9.8 gms, , Sna 129, Scr 4.3 mg, AST 91, ALT 35, , TBILI 29.7 mg, INR 1.8, Sammonia 11,     Imaging of the liver with CT scan demonstrated cirrhosis with a large volume of ascites. I spoke with sister who is in [facility] and plans to visit next week. 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 She is too muscle wasted and has and RANDY  Paracentesis catheter placed and is draining about 1L of ascites per day. Scr is not coming down with IV albumin  U/O is good 600-800 cc per day.       Feeding tube was placed  and she was getting tube feedings. She removed the feeding tube last night. An attempt to replace the feeding tube was met by resistance from the patient   She is more awake today and can answer questions appropriately. She says she can eat on her own and does not want the feeding tube    Hepatology Plan:  Try to get 3-5 bottle of Ensure into her daily. Adequate nutritional support is the only chance she has to survive. I am not hopeful. ASSESSMENT AND PLAN:  Cirrhosis  The diagnosis of cirrhosis is based upon imaging, laboratory studies, complications of cirrhosis. Cirrhosis is secondary to alcohol. The CTP is 10. Child class C. The MELD score is 39. Based upon the MELD and CTP scores the patient has a mortality of about 50-70% within the next 90 days   She is not a candidate for LT at this time because of severe malnutrition and advanced muscle wasting. Alcohol liver disease  Suspect the patient has alcohol induced liver disease based upon a history of consuming significant alcohol on a daily basis for many years, pattern of AST>ALT,   Serologies for other causes of chronic liver disease need to be obtained at some point    Ascites   Ascites developed for the first time in 8/2022. Paracentesis catheter placed and >5L ascites drained so far. Continue IV albumin at usual dose    Screening for Esophageal varices   The patient has not had an EGD to screen for varices. Will perform EGD at some point during the hospitalization. Not bleeding. Not top priority. Risk of sedation given severe malnutrition and muscle wasting is considerable. Hepatic encephalopathy   Overt HE has not developed to date. There is no reason for treatment with lactulose of xifaxan  There is no reason to give her diarrhea. There is no need to restrict dietary protein at this time. Coagulopathy  This is secondary to cirrhosis, malnutrition,   Will treat with 3 doses of Vitamin K over 3 days.   There is no evidence of bleeding. No need for FFP at this time. Malnutrition/muscle wasting  The patient has severe protein-calorie malnutrition with severe muscle wasting of the upper extremities, lower extremities, facial muscles  Malnutrition and muscle wasting is due to Alcohol abuse and poor caloric intake, cirrhosis and poor caloric utilization, refractory ascites and inability to eat sufficient calories,     The patient needs tube feedings. It is impossible for her to get any significant amount of calories by eating   Consult nutrition service for formula and to guide volume after feeding tube placed. PHYSICAL EXAMINATION:  VS: per nursing note  General:  Ill appearing  Eyes:  Sclera deeply icteric  ENT:  No oral lesions. Thyroid normal.  Nodes:  No adenopathy. Skin:  Numerous spider angiomata. Jaundice. Respiratory:  Lungs clear to auscultation. Cardiovascular:  Regular heart rate. Abdomen:  Distended with obvious ascites. Extremities:  No lower extremity edema. Severe muscle wasting. Neurologic:  Alert and oriented. Cranial nerves grossly intact. No asterixis. LABORATORY:   Latest Reference Range & Units 8/31/22 04:48 9/1/22 03:52 9/2/22 03:24 9/3/22 05:18   WBC 3.6 - 11.0 K/uL 16.7 (H)  18.8 (H) 21.4 (H)   HGB 11.5 - 16.0 g/dL 7.8 (L)  6.7 (L) 9.9 (L)   PLATELET 492 - 576 K/uL 222  154 149 (L)   INR 0.9 - 1.1   2.1 (H)  2.1 (H)    Sodium 136 - 145 mmol/L 131 (L) 133 (L) 134 (L) 137   Potassium 3.5 - 5.1 mmol/L 3.6 3.3 (L) 3.7 3.0 (L)   Chloride 97 - 108 mmol/L 101 104 103 105   CO2 21 - 32 mmol/L 16 (L) 14 (LL) 17 (L) 20 (L)   Glucose 65 - 100 mg/dL 74 64 (L) 62 (L) 142 (H)   BUN 6 - 20 MG/DL 69 (H) 70 (H) 69 (H) 67 (H)   Creatinine 0.55 - 1.02 MG/DL 4.45 (H) 4.63 (H) 4.55 (H) 4.44 (H)   Bilirubin, total 0.2 - 1.0 MG/DL 27.4 (H)      Albumin 3.5 - 5.0 g/dL 2.6 (L) 3.2 (L) 3.5 3.8   ALT 12 - 78 U/L 25      AST 15 - 37 U/L 70 (H)      Alk.  phosphatase 45 - 117 U/L 163 (H)      Ammonia, plasma <32 UMOL/L 53 (H)    (LL): Data is critically low  (H): Data is abnormally high  (L): Data is abnormally low      Edith Peabody, MD  [address], [address] [place] [address] 22  201 [facility]

## 2022-09-05 NOTE — PROGRESS NOTES
0730: Bedside and Verbal shift change report given to PASHA Lee (oncoming nurse) by Brennon Harris RN (offgoing nurse). Report included the following information SBAR, Kardex, Intake/Output, Cardiac Rhythm NSR, and Quality Measures. 1930: Bedside and Verbal shift change report given to 1200 Thang Oliva RN (oncoming nurse) by Brennon Harris RN (offgoing nurse). Report included the following information SBAR, Kardex, MAR, Recent Results, and Quality Measures. Care Plan:   Problem: Falls - Risk of  Goal: *Absence of Falls  Description: Document Janet Fall Risk and appropriate interventions in the flowsheet. Outcome: Progressing Towards Goal  Note: Fall Risk Interventions:  Mobility Interventions: Bed/chair exit alarm    Mentation Interventions: Adequate sleep, hydration, pain control, Bed/chair exit alarm, Door open when patient unattended    Medication Interventions: Assess postural VS orthostatic hypotension    Elimination Interventions: Bed/chair exit alarm, Call light in reach    History of Falls Interventions: Bed/chair exit alarm, Door open when patient unattended         Problem: Pressure Injury - Risk of  Goal: *Prevention of pressure injury  Description: Document Chandana Scale and appropriate interventions in the flowsheet.   Outcome: Progressing Towards Goal  Note: Pressure Injury Interventions:  Sensory Interventions: Assess changes in LOC, Minimize linen layers, Pressure redistribution bed/mattress (bed type)    Moisture Interventions: Absorbent underpads, Apply protective barrier, creams and emollients, Minimize layers    Activity Interventions: Assess need for specialty bed, Pressure redistribution bed/mattress(bed type)    Mobility Interventions: Assess need for specialty bed, Pressure redistribution bed/mattress (bed type)    Nutrition Interventions: Document food/fluid/supplement intake    Friction and Shear Interventions: Lift sheet, Minimize layers, Feet elevated on foot rest                Problem: Hypotension  Goal: *Blood pressure within specified parameters  Outcome: Progressing Towards Goal

## 2022-09-05 NOTE — PROGRESS NOTES
[facility]  Hospitalist Group                                                                                          Hospitalist Progress Note  Te Reese MD  Answering service: [phone] OR [phone] from in house phone        Date of Service:  2022  NAME:  Duncan Cline  :  1960  MRN:  [id number]      Admission Summary:   Per H&P, This is a 61-year-old woman with a past medical history significant for hypertension, alcoholic liver cirrhosis, who presented at the emergency room with abdominal pain. The patient presented at [facility] Emergency Room. When the patient arrived at the emergency room, the patient was found to have decompensation of liver cirrhosis. The hospitalist service at [facility] was asked to directly admit the patient to [facility], so that the patient can be seen by the hepatologist.  The patient came to the emergency room at [facility], because of lack of bed on the on the medical floor. When the patient arrived at the emergency room, the patient's lab work shows significant abnormality. She was subsequently referred to the hospitalist service for admission. No record of prior admission to this hospital.  It was reported that the patient underwent abdominal paracentesis before coming to [facility].  The amount of fluid removed is not known. The patient's abdominal pain is diffuse in location, dull ache, constant, 7/10 in severity with no known aggravating or relieving factors associated with nausea, but no vomiting, no diarrhea, and no constipation. Interval history / Subjective:   More alert today, still confused. Tolerating PO intake but total amount remains poor. No n/v/d or dyspnea.      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 Serum ammonia was mildly elevated. Likely developed HE. Will continue lactulose at low dose with goal 2 bm's daily. Severe protein calorie malnutrition  Muscle wasting, bitemporal wasting. Due to her disease process  Per hepatology will start tube feeds  Consulting with RD  DHT advanced to duodenum 9/1 and resumed TFs - FT came out overnight, could not be replaced. Became more alert after lactulose so now on diet but PO intake remains poor. Acute kidney injury  Suspected ATN, IVVD due to poor PO intake, hypotension, NSAID use. HRS on ddx. No improvement with IV albumin but continue the same. IVF resumed 9/4 due to NGT being out; if PO  intake is good, we can d/c this    Hypotension  Continue midodrine, IV albumin and IV fluids as above    Anemia  Iron studies consistent with chronic disease  Transfuse PRN - unit ordered for 9/2  Vitamin K per hepatology for coagulopathy    Persistent leukocytosis: unclear etiology, no evidence of infection clinically. Afebrile. Has been on empiric IV ceftriaxone for 6 days, stopped 9/4. Sent UA and blood cultures. History of HTN    This patient is critically-ill and at risk for decline due to: decompensated cirrhosis, renal failure. CCT 35 minutes. Code status: DNR - continue full current care otherwise but do not escalate (if pt rapidly deteriorates make comfort care and inform sister over the phone 24/7). See 9/2 PN for my discussion with her.   Prophylaxis: SCD  Care Plan discussed with: RN     Hospital Problems  Date Reviewed: 8/30/2022            Codes Class Noted POA    Severe protein-calorie malnutrition ([facility] [address] (Chronic) ICD-10-CM: I79  ICD-9-CM: 510  8/31/2022 Yes        * (Principal) Alcoholic cirrhosis of liver with ascites ([facility] [address] ICD-10-CM: K70.31  ICD-9-CM: 571.2  8/30/2022 Yes Review of Systems:   Review of systems not obtained due to patient factors. Vital Signs:    Last 24hrs VS reviewed since prior progress note. Most recent are:  Visit Vitals  BP (!) 110/40 (BP 1 Location: Left upper arm, BP Patient Position: At rest)   Pulse 71   Temp 98.2 °F (36.8 °C)   Resp 15   Ht 5' 3\" (1.6 m)   Wt 44.3 kg (97 lb 9.6 oz)   SpO2 97%   BMI 17.29 kg/m²         Intake/Output Summary (Last 24 hours) at 9/5/2022 1117  Last data filed at 9/5/2022 1100  Gross per 24 hour   Intake 1505 ml   Output 1775 ml   Net -270 ml          Physical Examination:     I had a face to face encounter with this patient and independently examined them on 9/5/2022 as outlined below:          Constitutional:  NAD, cachectic, chronically-ill appearing   ENT:  Oral mucosa moist, Icteric sclerae    Resp:  CTA bilaterally. No wheezing/rhonchi/rales. No accessory muscle use. CV:  Regular rhythm, normal rate, soft systolic murmur     GI: Soft, mildly distended, NT    Musculoskeletal:    No edema, warm, 2+ pulses throughout jaundiced    Neurologic:  Grossly non-focal, more alert today, oriented to self     Skin: jaundiced       Data Review:    Review and/or order of clinical lab test  Review and/or order of tests in the radiology section of CPT      Labs:     Recent Labs     09/05/22 0501 09/03/22 0518   WBC 22.2* 21.4*   HGB 10.1* 9.9*   HCT 29.3* 28.0*   * 149*       Recent Labs     09/05/22 0501 09/04/22 0328 09/03/22 0518    139 137   K 3.5 3.6 3.0*    108 105   CO2 18* 20* 20*   BUN 62* 70* 67*   CREA 3.16* 3.71* 4.44*   * 102* 142*   CA 9.1 9.4 9.1   MG 1.7 1.9 1.9   PHOS 2.6 3.1 2.7       Recent Labs     09/05/22  0501 09/04/22 0328 09/03/22 0518   ALT 14  --   --      --   --    TBILI 19.3*  --   --    TP 5.3*  --   --    ALB 4.2  4.1 4.1 3.8   GLOB 1.1*  --   --        No results for input(s): INR, PTP, APTT, INREXT, INREXT in the last 72 hours.      No results for input(s): FE, TIBC, PSAT, FERR in the last 72 hours. Lab Results   Component Value Date/Time    Folate 14.7 08/30/2022 01:52 AM        No results for input(s): PH, PCO2, PO2 in the last 72 hours. No results for input(s): CPK, CKNDX, TROIQ in the last 72 hours.     No lab exists for component: CPKMB  No results found for: CHOL, CHOLX, CHLST, CHOLV, HDL, HDLP, LDL, LDLC, DLDLP, TGLX, TRIGL, TRIGP, CHHD, CHHDX  Lab Results   Component Value Date/Time    Glucose (POC) 171 (H) 09/03/2022 12:06 AM    Glucose (POC) 154 (H) 09/02/2022 01:43 PM    Glucose (POC) 113 09/02/2022 06:11 AM     Lab Results   Component Value Date/Time    Color DARK YELLOW 09/04/2022 05:22 PM    Appearance TURBID (A) 09/04/2022 05:22 PM    Specific gravity 1.017 09/04/2022 05:22 PM    pH (UA) 5.5 09/04/2022 05:22 PM    Protein 100 (A) 09/04/2022 05:22 PM    Glucose Negative 09/04/2022 05:22 PM    Ketone Negative 09/04/2022 05:22 PM    Urobilinogen 1.0 09/04/2022 05:22 PM    Nitrites Positive (A) 09/04/2022 05:22 PM    Leukocyte Esterase LARGE (A) 09/04/2022 05:22 PM    Epithelial cells FEW 09/04/2022 05:22 PM    Bacteria 1+ (A) 09/04/2022 05:22 PM    WBC 5-10 09/04/2022 05:22 PM    RBC 5-10 09/04/2022 05:22 PM         Medications Reviewed:     Current Facility-Administered Medications   Medication Dose Route Frequency    sodium bicarbonate (8.4%) 150 mEq in dextrose 5% 1,000 mL infusion   IntraVENous CONTINUOUS    lactulose (CHRONULAC) 10 gram/15 mL solution 30 mL  20 g Oral DAILY    midodrine (PROAMATINE) tablet 15 mg  15 mg Oral Q8H    octreotide (SANDOSTATIN) 500 mcg in 0.9% sodium chloride 500 mL infusion  50 mcg/hr IntraVENous CONTINUOUS    sodium bicarbonate tablet 650 mg  650 mg Oral BID    0.9% sodium chloride infusion 250 mL  250 mL IntraVENous PRN    albumin human 25% (BUMINATE) solution 25 g  25 g IntraVENous Q6H    [Held by provider] oxyCODONE IR (ROXICODONE) tablet 5 mg  5 mg Oral Q4H PRN    [Held by provider] cefTRIAXone (ROCEPHIN) 2 g in 0.9% sodium chloride 20 mL IV syringe  2 g IntraVENous Q24H    ARIPiprazole (ABILIFY) tablet 2 mg  2 mg Oral DAILY    buPROPion SR (WELLBUTRIN SR) tablet 150 mg  150 mg Oral DAILY    [Held by provider] LORazepam (ATIVAN) tablet 1 mg  1 mg Oral BID PRN    pantoprazole (PROTONIX) 40 mg in 0.9% sodium chloride 10 mL injection  40 mg IntraVENous ACB    sodium chloride (NS) flush 5-40 mL  5-40 mL IntraVENous Q8H    sodium chloride (NS) flush 5-40 mL  5-40 mL IntraVENous PRN    acetaminophen (TYLENOL) tablet 650 mg  650 mg Oral Q6H PRN    Or    acetaminophen (TYLENOL) suppository 650 mg  650 mg Rectal Q6H PRN    polyethylene glycol (MIRALAX) packet 17 g  17 g Oral DAILY PRN    ondansetron (ZOFRAN ODT) tablet 4 mg  4 mg Oral Q8H PRN    Or    ondansetron (ZOFRAN) injection 4 mg  4 mg IntraVENous Q6H PRN    heparin (porcine) injection 5,000 Units  5,000 Units SubCUTAneous Q8H    L.acidophilus-paracasei-S.thermophil-bifidobacter (RISAQUAD) 8 billion cell capsule  1 Capsule Oral DAILY    nicotine (NICODERM CQ) 21 mg/24 hr patch 1 Patch  1 Patch TransDERmal DAILY     ______________________________________________________________________  EXPECTED LENGTH OF STAY: 4d 16h  ACTUAL LENGTH OF STAY:          6                 Erik Molina MD

## 2022-09-05 NOTE — PROGRESS NOTES
Renal Progress Note    NAME:  Tierra Garcia   :   1960   MRN:   [id number]     Date/Time:  2022 12:09 PM  Subjective:       , feels ok, no N/v/SOB. Worry about her pets   seen and examined, has no complaint, not oriented to place or time, had ascitis drain placed by IR, 3 lit drained sofar, 400 ml uo   more sleepy and less interactive, does not answer the questions. Oliguric, no significant change in cr. Possibly HRS. WBC still elevated. Has NGT on TF  9/3 awake, weak, denies pain, NGT is out and will be replaced. Had 600 ml urine/o, BP better, cr stable   more awake and alert, interactive today, wants to eat, , renal function improving, unable to place back the NGT, still significant leukocytosis. Denies abd pain, cough   awake and alert, denies pain, nausea, abd pain. On po diet now, refused TF.  Renal function improving, borderline UO        Medications reviewed:  Current Facility-Administered Medications   Medication Dose Route Frequency    potassium bicarb-citric acid (EFFER-K) tablet 20 mEq  20 mEq Oral NOW    sodium bicarbonate (8.4%) 150 mEq in dextrose 5% 1,000 mL infusion   IntraVENous CONTINUOUS    lactulose (CHRONULAC) 10 gram/15 mL solution 30 mL  20 g Oral DAILY    midodrine (PROAMATINE) tablet 15 mg  15 mg Oral Q8H    octreotide (SANDOSTATIN) 500 mcg in 0.9% sodium chloride 500 mL infusion  50 mcg/hr IntraVENous CONTINUOUS    sodium bicarbonate tablet 650 mg  650 mg Oral BID    0.9% sodium chloride infusion 250 mL  250 mL IntraVENous PRN    albumin human 25% (BUMINATE) solution 25 g  25 g IntraVENous Q6H    [Held by provider] oxyCODONE IR (ROXICODONE) tablet 5 mg  5 mg Oral Q4H PRN    [Held by provider] cefTRIAXone (ROCEPHIN) 2 g in 0.9% sodium chloride 20 mL IV syringe  2 g IntraVENous Q24H    ARIPiprazole (ABILIFY) tablet 2 mg  2 mg Oral DAILY    buPROPion SR (WELLBUTRIN SR) tablet 150 mg  150 mg Oral DAILY    [Held by provider] LORazepam (ATIVAN) tablet 1 mg  1 mg Oral BID PRN    pantoprazole (PROTONIX) 40 mg in 0.9% sodium chloride 10 mL injection  40 mg IntraVENous ACB    sodium chloride (NS) flush 5-40 mL  5-40 mL IntraVENous Q8H    sodium chloride (NS) flush 5-40 mL  5-40 mL IntraVENous PRN    acetaminophen (TYLENOL) tablet 650 mg  650 mg Oral Q6H PRN    Or    acetaminophen (TYLENOL) suppository 650 mg  650 mg Rectal Q6H PRN    polyethylene glycol (MIRALAX) packet 17 g  17 g Oral DAILY PRN    ondansetron (ZOFRAN ODT) tablet 4 mg  4 mg Oral Q8H PRN    Or    ondansetron (ZOFRAN) injection 4 mg  4 mg IntraVENous Q6H PRN    heparin (porcine) injection 5,000 Units  5,000 Units SubCUTAneous Q8H    L.acidophilus-paracasei-S.thermophil-bifidobacter (RISAQUAD) 8 billion cell capsule  1 Capsule Oral DAILY    nicotine (NICODERM CQ) 21 mg/24 hr patch 1 Patch  1 Patch TransDERmal DAILY        Objective:   Vitals:  Visit Vitals  BP (!) 113/56 (BP 1 Location: Left upper arm, BP Patient Position: At rest)   Pulse 72   Temp 98.5 °F (36.9 °C)   Resp 15   Ht 5' 3\" (1.6 m)   Wt 44.3 kg (97 lb 9.6 oz)   SpO2 97%   BMI 17.29 kg/m²     Temp (24hrs), Av.3 °F (36.8 °C), Min:97.8 °F (36.6 °C), Max:98.6 °F (37 °C)      O2 Device: None (Room air)    Last 24hr Input/Output:    Intake/Output Summary (Last 24 hours) at 2022 3544  Last data filed at 2022 0800  Gross per 24 hour   Intake 1355 ml   Output 1575 ml   Net -220 ml          Physical Exam:  General: Jaundiced, awake, interactive  HEENT:  sclerae icteric,  Neck: Supple  Lungs : Clears to auscultation Bilaterally, Normal respiratory effort  CVS: RRR, S1 S2 normal, No rub, no  LE edema  Abdomen: Soft, Non tender,   Ascitis+, paredes, abdomianl drain+  : paredes  Extremities: No edema  Skin: jaundice  Neurologic: awake, nl speech    Lab Data Reviewed:    Recent Results (from the past 24 hour(s))   CULTURE, BLOOD, PAIRED    Collection Time: 22  2:21 PM    Specimen: Blood   Result Value Ref Range    Special Requests: NO SPECIAL REQUESTS      Culture result: NO GROWTH AFTER 16 HOURS     URINALYSIS W/ REFLEX CULTURE    Collection Time: 09/04/22  5:22 PM    Specimen: Miscellaneous sample    Urine specimen   Result Value Ref Range    Color DARK YELLOW      Appearance TURBID (A) CLEAR      Specific gravity 1.017 1.003 - 1.030      pH (UA) 5.5 5.0 - 8.0      Protein 100 (A) NEG mg/dL    Glucose Negative NEG mg/dL    Ketone Negative NEG mg/dL    Blood MODERATE (A) NEG      Urobilinogen 1.0 0.2 - 1.0 EU/dL    Nitrites Positive (A) NEG      Leukocyte Esterase LARGE (A) NEG      WBC 5-10 0 - 4 /hpf    RBC 5-10 0 - 5 /hpf    Epithelial cells FEW FEW /lpf    Bacteria 1+ (A) NEG /hpf    UA:UC IF INDICATED CULTURE NOT INDICATED BY UA RESULT CNI      Hyaline cast 0-2 0 - 5 /lpf    Yeast PRESENT (A) NEG      Budding yeast PRESENT (A) NEG      Other: Renal Epithelial cells Present     BILIRUBIN, CONFIRM    Collection Time: 09/04/22  5:22 PM   Result Value Ref Range    Bilirubin UA, confirm Positive (A) NEG     RENAL FUNCTION PANEL    Collection Time: 09/05/22  5:01 AM   Result Value Ref Range    Sodium 138 136 - 145 mmol/L    Potassium 3.5 3.5 - 5.1 mmol/L    Chloride 108 97 - 108 mmol/L    CO2 18 (L) 21 - 32 mmol/L    Anion gap 12 5 - 15 mmol/L    Glucose 127 (H) 65 - 100 mg/dL    BUN 62 (H) 6 - 20 MG/DL    Creatinine 3.16 (H) 0.55 - 1.02 MG/DL    BUN/Creatinine ratio 20 12 - 20      GFR est AA 18 (L) >60 ml/min/1.73m2    GFR est non-AA 15 (L) >60 ml/min/1.73m2    Calcium 9.1 8.5 - 10.1 MG/DL    Phosphorus 2.6 2.6 - 4.7 MG/DL    Albumin 4.1 3.5 - 5.0 g/dL   CBC WITH AUTOMATED DIFF    Collection Time: 09/05/22  5:01 AM   Result Value Ref Range    WBC 22.2 (H) 3.6 - 11.0 K/uL    RBC 2.96 (L) 3.80 - 5.20 M/uL    HGB 10.1 (L) 11.5 - 16.0 g/dL    HCT 29.3 (L) 35.0 - 47.0 %    MCV 99.0 80.0 - 99.0 FL    MCH 34.1 (H) 26.0 - 34.0 PG    MCHC 34.5 30.0 - 36.5 g/dL    RDW 21.6 (H) 11.5 - 14.5 %    PLATELET 135 (L) 683 - 400 K/uL    MPV 10.8 8.9 - 12.9 FL    NRBC 0.0 0  WBC    ABSOLUTE NRBC 0.00 0.00 - 0.01 K/uL    NEUTROPHILS 76 (H) 32 - 75 %    LYMPHOCYTES 8 (L) 12 - 49 %    MONOCYTES 12 5 - 13 %    EOSINOPHILS 1 0 - 7 %    BASOPHILS 1 0 - 1 %    IMMATURE GRANULOCYTES 2 (H) 0.0 - 0.5 %    ABS. NEUTROPHILS 16.9 (H) 1.8 - 8.0 K/UL    ABS. LYMPHOCYTES 1.8 0.8 - 3.5 K/UL    ABS. MONOCYTES 2.7 (H) 0.0 - 1.0 K/UL    ABS. EOSINOPHILS 0.2 0.0 - 0.4 K/UL    ABS. BASOPHILS 0.2 (H) 0.0 - 0.1 K/UL    ABS. IMM. GRANS. 0.4 (H) 0.00 - 0.04 K/UL    DF SMEAR SCANNED      RBC COMMENTS MACROCYTOSIS  1+        RBC COMMENTS ANISOCYTOSIS  2+        RBC COMMENTS OVALOCYTES  1+        RBC COMMENTS CM CELLS  PRESENT       MAGNESIUM    Collection Time: 09/05/22  5:01 AM   Result Value Ref Range    Magnesium 1.7 1.6 - 2.4 mg/dL         Assessment/Plan:   Principal Problem:    Alcoholic cirrhosis of liver with ascites ([facility] [address] (8/30/2022)    Active Problems:    Severe protein-calorie malnutrition ([facility] [address] (8/31/2022)     Acute kidney injury -cr 0.4 in early August,   -RANYD/possible ATN due to intravascular volume depletion in setting of poor po intake, hypotension /third spacing and possible sepsis/NSAIDs. Hepatorenal in DDx . Low FeNa-low urine output  Possible HRS. Not a candidate liver transplant Per Dr Cuca Marks. Not a candidate for RRT.    -renal function improving  -c/wMidodrine 15 mg tid  -c/w octreotide  -c/w with IV albumin  -c/w  po bicarb if able to take po, otherwise agree with IVF with bicarb   -off tube feeding-nutritional support  -strict Is and Os  -avoid nephrotoxins, IV contrast/NSAIDs  -dose meds per GFR  -f/up  renal function     Hypotension-with h/o HTN on Coreg, due to effective intravascular volume depletion  -on prednisone per med list, but patient denies taking that.  -midodrine     Hyponatremia, due to liver cirrhosis and volume depletion, chronic since early August  -improving with IVF  -Avoid rapid correction  -improved    Hypokalemia   -replete prn    Alcoholic liver cirrhosis-decompensated. -Hypoalbuminemia/ascitis/coagulopathy-nl ammonia  -hepatology following-not a candidate for liver transplant. Poor prognosis  -ammonia higher, on lactulose    Anemia  -high iron stores  -Hb has dropped, might be due to hydration, r/o bleed  -transfuse for Hb<7    Persistent Leukocytosis: off Rocephin-source of infection?     Hypoglycemia, off TF-monitor-changed IVF to D5 with bicarb    Noted DNR now    Poor prognosis  ___________________________________________________    Total time spent with patient:  [] 15   [] 25   [x] 35   []  __ minutes    [] Critical Care Provided    Care Plan discussed with:RN and Dr Melody Moore    [x] Patient   [] Family    [] Care Manager   [] Consultant/Specialist :      []   >50% of visit spent in counseling and coordination of care   (Discussed [] CODE status,  [] Care Plan, [] D/C Planning)    ___________________________________________________    Attending Physician: MD Randall Maciel

## 2022-09-06 ENCOUNTER — APPOINTMENT (OUTPATIENT)
Dept: GENERAL RADIOLOGY | Age: 62
DRG: 280 | End: 2022-09-06
Attending: HOSPITALIST
Payer: MEDICAID

## 2022-09-06 LAB
ALBUMIN SERPL-MCNC: 3 G/DL (ref 3.5–5)
ANION GAP SERPL CALC-SCNC: 6 MMOL/L (ref 5–15)
ANION GAP SERPL CALC-SCNC: 9 MMOL/L (ref 5–15)
APPEARANCE FLD: ABNORMAL
BUN SERPL-MCNC: 46 MG/DL (ref 6–20)
BUN SERPL-MCNC: 54 MG/DL (ref 6–20)
BUN/CREAT SERPL: 22 (ref 12–20)
BUN/CREAT SERPL: 22 (ref 12–20)
CALCIUM SERPL-MCNC: 7.1 MG/DL (ref 8.5–10.1)
CALCIUM SERPL-MCNC: 9 MG/DL (ref 8.5–10.1)
CHLORIDE SERPL-SCNC: 102 MMOL/L (ref 97–108)
CHLORIDE SERPL-SCNC: 96 MMOL/L (ref 97–108)
CO2 SERPL-SCNC: 29 MMOL/L (ref 21–32)
CO2 SERPL-SCNC: 38 MMOL/L (ref 21–32)
COLOR FLD: YELLOW
CREAT SERPL-MCNC: 2.12 MG/DL (ref 0.55–1.02)
CREAT SERPL-MCNC: 2.51 MG/DL (ref 0.55–1.02)
ERYTHROCYTE [DISTWIDTH] IN BLOOD BY AUTOMATED COUNT: 21.4 % (ref 11.5–14.5)
GLUCOSE BLD STRIP.AUTO-MCNC: 145 MG/DL (ref 65–117)
GLUCOSE SERPL-MCNC: 132 MG/DL (ref 65–100)
GLUCOSE SERPL-MCNC: 533 MG/DL (ref 65–100)
HCT VFR BLD AUTO: 24.3 % (ref 35–47)
HGB BLD-MCNC: 8.7 G/DL (ref 11.5–16)
LYMPHOCYTES NFR FLD: 18 %
MCH RBC QN AUTO: 34.8 PG (ref 26–34)
MCHC RBC AUTO-ENTMCNC: 35.8 G/DL (ref 30–36.5)
MCV RBC AUTO: 97.2 FL (ref 80–99)
MESOTHL CELL NFR FLD: 6 %
MONOS+MACROS NFR FLD: 59 %
NEUTROPHILS NFR FLD: 17 %
NRBC # BLD: 0 K/UL (ref 0–0.01)
NRBC BLD-RTO: 0 PER 100 WBC
NUC CELL # FLD: 118 /CU MM
PHOSPHATE SERPL-MCNC: 1.3 MG/DL (ref 2.6–4.7)
PLATELET # BLD AUTO: 89 K/UL (ref 150–400)
PMV BLD AUTO: 10.4 FL (ref 8.9–12.9)
POTASSIUM SERPL-SCNC: 2.2 MMOL/L (ref 3.5–5.1)
POTASSIUM SERPL-SCNC: 2.6 MMOL/L (ref 3.5–5.1)
RBC # BLD AUTO: 2.5 M/UL (ref 3.8–5.2)
RBC # FLD: >100 /CU MM
SERVICE CMNT-IMP: ABNORMAL
SODIUM SERPL-SCNC: 140 MMOL/L (ref 136–145)
SODIUM SERPL-SCNC: 140 MMOL/L (ref 136–145)
SPECIMEN SOURCE FLD: ABNORMAL
WBC # BLD AUTO: 22.8 K/UL (ref 3.6–11)

## 2022-09-06 PROCEDURE — 82962 GLUCOSE BLOOD TEST: CPT

## 2022-09-06 PROCEDURE — 74011636637 HC RX REV CODE- 636/637: Performed by: INTERNAL MEDICINE

## 2022-09-06 PROCEDURE — 74011250637 HC RX REV CODE- 250/637: Performed by: INTERNAL MEDICINE

## 2022-09-06 PROCEDURE — 85027 COMPLETE CBC AUTOMATED: CPT

## 2022-09-06 PROCEDURE — 74011000250 HC RX REV CODE- 250: Performed by: INTERNAL MEDICINE

## 2022-09-06 PROCEDURE — 74011250636 HC RX REV CODE- 250/636: Performed by: INTERNAL MEDICINE

## 2022-09-06 PROCEDURE — P9047 ALBUMIN (HUMAN), 25%, 50ML: HCPCS | Performed by: INTERNAL MEDICINE

## 2022-09-06 PROCEDURE — 97535 SELF CARE MNGMENT TRAINING: CPT

## 2022-09-06 PROCEDURE — 65660000001 HC RM ICU INTERMED STEPDOWN

## 2022-09-06 PROCEDURE — 80069 RENAL FUNCTION PANEL: CPT

## 2022-09-06 PROCEDURE — 80048 BASIC METABOLIC PNL TOTAL CA: CPT

## 2022-09-06 PROCEDURE — 36415 COLL VENOUS BLD VENIPUNCTURE: CPT

## 2022-09-06 PROCEDURE — 74011250637 HC RX REV CODE- 250/637: Performed by: HOSPITALIST

## 2022-09-06 PROCEDURE — 89050 BODY FLUID CELL COUNT: CPT

## 2022-09-06 PROCEDURE — 97530 THERAPEUTIC ACTIVITIES: CPT

## 2022-09-06 PROCEDURE — 99233 SBSQ HOSP IP/OBS HIGH 50: CPT | Performed by: INTERNAL MEDICINE

## 2022-09-06 PROCEDURE — 74011250636 HC RX REV CODE- 250/636: Performed by: HOSPITALIST

## 2022-09-06 PROCEDURE — C9113 INJ PANTOPRAZOLE SODIUM, VIA: HCPCS | Performed by: INTERNAL MEDICINE

## 2022-09-06 RX ORDER — POTASSIUM CHLORIDE 7.45 MG/ML
10 INJECTION INTRAVENOUS
Status: COMPLETED | OUTPATIENT
Start: 2022-09-06 | End: 2022-09-06

## 2022-09-06 RX ADMIN — SODIUM BICARBONATE: 84 INJECTION, SOLUTION INTRAVENOUS at 00:33

## 2022-09-06 RX ADMIN — POTASSIUM BICARBONATE 20 MEQ: 782 TABLET, EFFERVESCENT ORAL at 10:45

## 2022-09-06 RX ADMIN — OCTREOTIDE ACETATE 50 MCG/HR: 500 INJECTION, SOLUTION INTRAVENOUS; SUBCUTANEOUS at 23:51

## 2022-09-06 RX ADMIN — POTASSIUM CHLORIDE 10 MEQ: 7.46 INJECTION, SOLUTION INTRAVENOUS at 14:37

## 2022-09-06 RX ADMIN — POTASSIUM CHLORIDE 10 MEQ: 7.46 INJECTION, SOLUTION INTRAVENOUS at 14:36

## 2022-09-06 RX ADMIN — POTASSIUM CHLORIDE 10 MEQ: 7.46 INJECTION, SOLUTION INTRAVENOUS at 13:16

## 2022-09-06 RX ADMIN — ARIPIPRAZOLE 2 MG: 2 TABLET ORAL at 10:45

## 2022-09-06 RX ADMIN — NYSTATIN 500000 UNITS: 100000 SUSPENSION ORAL at 10:46

## 2022-09-06 RX ADMIN — POTASSIUM CHLORIDE 10 MEQ: 7.46 INJECTION, SOLUTION INTRAVENOUS at 13:09

## 2022-09-06 RX ADMIN — SODIUM BICARBONATE 650 MG: 650 TABLET ORAL at 19:06

## 2022-09-06 RX ADMIN — MIDODRINE HYDROCHLORIDE 15 MG: 5 TABLET ORAL at 13:25

## 2022-09-06 RX ADMIN — SODIUM CHLORIDE, PRESERVATIVE FREE 10 ML: 5 INJECTION INTRAVENOUS at 13:10

## 2022-09-06 RX ADMIN — SODIUM CHLORIDE, PRESERVATIVE FREE 10 ML: 5 INJECTION INTRAVENOUS at 06:46

## 2022-09-06 RX ADMIN — NYSTATIN 500000 UNITS: 100000 SUSPENSION ORAL at 19:06

## 2022-09-06 RX ADMIN — OCTREOTIDE ACETATE 50 MCG/HR: 500 INJECTION, SOLUTION INTRAVENOUS; SUBCUTANEOUS at 10:44

## 2022-09-06 RX ADMIN — ALBUMIN (HUMAN) 25 G: 0.25 INJECTION, SOLUTION INTRAVENOUS at 00:34

## 2022-09-06 RX ADMIN — SODIUM BICARBONATE 650 MG: 650 TABLET ORAL at 10:46

## 2022-09-06 RX ADMIN — HEPARIN SODIUM 5000 UNITS: 5000 INJECTION INTRAVENOUS; SUBCUTANEOUS at 00:33

## 2022-09-06 RX ADMIN — MIDODRINE HYDROCHLORIDE 15 MG: 5 TABLET ORAL at 21:22

## 2022-09-06 RX ADMIN — PANTOPRAZOLE SODIUM 40 MG: 40 INJECTION, POWDER, FOR SOLUTION INTRAVENOUS at 06:45

## 2022-09-06 RX ADMIN — SODIUM BICARBONATE: 84 INJECTION, SOLUTION INTRAVENOUS at 16:37

## 2022-09-06 RX ADMIN — SODIUM CHLORIDE, PRESERVATIVE FREE 10 ML: 5 INJECTION INTRAVENOUS at 21:22

## 2022-09-06 RX ADMIN — NYSTATIN 500000 UNITS: 100000 SUSPENSION ORAL at 21:22

## 2022-09-06 RX ADMIN — Medication 1 CAPSULE: at 10:45

## 2022-09-06 RX ADMIN — NYSTATIN 500000 UNITS: 100000 SUSPENSION ORAL at 13:24

## 2022-09-06 RX ADMIN — MIDODRINE HYDROCHLORIDE 15 MG: 5 TABLET ORAL at 06:45

## 2022-09-06 RX ADMIN — HEPARIN SODIUM 5000 UNITS: 5000 INJECTION INTRAVENOUS; SUBCUTANEOUS at 10:45

## 2022-09-06 RX ADMIN — ALBUMIN (HUMAN) 25 G: 0.25 INJECTION, SOLUTION INTRAVENOUS at 06:45

## 2022-09-06 RX ADMIN — BUPROPION HYDROCHLORIDE 150 MG: 150 TABLET, EXTENDED RELEASE ORAL at 10:45

## 2022-09-06 RX ADMIN — LACTULOSE 30 ML: 20 SOLUTION ORAL at 10:46

## 2022-09-06 NOTE — PROGRESS NOTES
Problem: Self Care Deficits Care Plan (Adult)  Goal: *Acute Goals and Plan of Care (Insert Text)  Description: FUNCTIONAL STATUS PRIOR TO ADMISSION: At time of evaluation pt is a questionable historian. Patient was modified independent using a walker and single point cane for functional mobility. HOME SUPPORT: The patient lived alone with her friend to provide assistance as needed. Occupational Therapy Goals  Initiated 9/1/2022  1. Patient will perform standing grooming routine with supervision/set-up within 7 day(s). 2.  Patient will perform seated upper and lower body bathing with supervision/set-up within 7 day(s). 3.  Patient will perform upper and lower body dressing with supervision/set-up within 7 day(s). 4.  Patient will perform toilet transfers with supervision/set-up within 7 day(s). 5.  Patient will perform all aspects of toileting with supervision/set-up within 7 day(s). 6.  Patient will participate in upper extremity therapeutic exercise/activities with supervision/set-up for 5 minutes within 7 day(s). Outcome: Progressing Towards Goal    OCCUPATIONAL THERAPY TREATMENT  Patient: Annamaria Ramos (8 y.o. female)  Date: 9/6/2022  Diagnosis: Alcoholic cirrhosis of liver with ascites (HCC) [B38.17] Alcoholic cirrhosis of liver with ascites [facility])      Precautions: Fall  Chart, occupational therapy assessment, plan of care, and goals were reviewed. ASSESSMENT  Patient continues with skilled OT services and is progressing towards goals. Pt remains confused, oriented to person and year but required re-orientation to her birth date. She required mod A x 2 for supine to sit with fair to good static sitting balance EOB. Min A x 2 to stand with max verbal cues for proper hand placement with RW and up to mod A x 2 to transfer (via stand pivot) to chair with RW and cues for safety.       Pt presents with delayed processing, increased time for sequencing and basic one step commands during ADL tasks. Max A to wash hair with use of shower hair and max A to brush hair in chair, setup washcloth in R hand. She needs assist with all ADLs at this time and presents as a large fall risk. Current Level of Function Impacting Discharge (ADLs): mod A x 2 transfer to chair, max A for hair washing, decreased cognition     Other factors to consider for discharge: from home         PLAN :  Patient continues to benefit from skilled intervention to address the above impairments. Continue treatment per established plan of care to address goals. Recommend with staff: OOB to chair, BSC with A x 2 and RW, close to bed    Recommend next OT session: ADLs    Recommendation for discharge: (in order for the patient to meet his/her long term goals)  Working towards tolerating intensity IPR    This discharge recommendation:  Has been made in collaboration with the attending provider and/or case management    IF patient discharges home will need the following DME: TBD       SUBJECTIVE:   Patient stated [phone].  (for birth year, re-oriented to birth date and current year)    OBJECTIVE DATA SUMMARY:   Cognitive/Behavioral Status:  Neurologic State: Alert;Confused  Orientation Level: Oriented to person (oriented to year, required prompting for correct Bday)  Cognition: Follows commands;Decreased attention/concentration  Perception: Appears intact  Perseveration: No perseveration noted  Safety/Judgement: Decreased insight into deficits; Fall prevention    Functional Mobility and Transfers for ADLs:  Bed Mobility:  Rolling: Minimum assistance;Assist x1  Supine to Sit: Moderate assistance;Assist x2  Scooting: Maximum assistance;Assist x1    Transfers:  Sit to Stand: Minimum assistance;Assist x2; Additional time; Adaptive equipment (RW)     Bed to Chair: Moderate assistance;Assist x2; Additional time; Adaptive equipment (decreased sequencing, decreased advacement R LE)    Balance:  Sitting: Impaired; With support  Sitting - Static: Good (unsupported); Fair (occasional)  Sitting - Dynamic: Fair (occasional)  Standing: Impaired; With support  Standing - Static: Constant support; Fair  Standing - Dynamic : Poor    ADL Intervention:       Grooming  Brushing/Combing Hair: Maximum assistance;Proximal stability (pillow at B elbows, very slow processing)  Cues: Mirror for visual feedback; Physical assistance;Verbal cues provided         Type of Bath: Chlorhexidine (CHG)                        Cognitive Retraining  Orientation Retraining: Reorienting  Problem Solving: Identifying the task  Executive Functions: Executing cognitive plans  Organizing/Sequencing: Breaking task down  Attention to Task: Distractibility; Single task  Safety/Judgement: Decreased insight into deficits; Fall prevention  Cues: Tactile cues provided;Verbal cues provided;Visual cues provided        Pain:  none    Activity Tolerance:   Fair    Vitals:    09/06/22 1400 09/06/22 1424 09/06/22 1445 09/06/22 1516   BP:  (!) 101/43 (!) 101/54 (!) 94/48   BP 1 Location:  Left upper arm Left upper arm Left upper arm   BP Patient Position:    Sitting   Pulse: 77 73 78 75   Temp:    99.1 °F (37.3 °C)   Resp:  18 23 18   Height:       Weight:       SpO2:  100%  100%        After treatment patient left in no apparent distress:   Sitting in chair, Call bell within reach, and Bed / chair alarm activated    COMMUNICATION/COLLABORATION:   The patients plan of care was discussed with: Registered nurse.      Hany Feliz OT  Time Calculation: 42 mins

## 2022-09-06 NOTE — PROGRESS NOTES
Renal Progress Note    NAME:  Halley Ace   :   1960   MRN:   [id number]     Date/Time:  2022 12:09 PM  Subjective:       , feels ok, no N/v/SOB. Worry about her pets   seen and examined, has no complaint, not oriented to place or time, had ascitis drain placed by IR, 3 lit drained sofar, 400 ml uo   more sleepy and less interactive, does not answer the questions. Oliguric, no significant change in cr. Possibly HRS. WBC still elevated. Has NGT on TF  9/3 awake, weak, denies pain, NGT is out and will be replaced. Had 600 ml urine/o, BP better, cr stable   more awake and alert, interactive today, wants to eat, , renal function improving, unable to place back the NGT, still significant leukocytosis. Denies abd pain, cough   awake and alert, denies pain, nausea, abd pain. On po diet now, refused TF. Renal function improving, borderline UO     sleeping, UO borderline 450ml, still draining>1.5 lit /day from ascitis, persistent leukocytosis, OFF Abx. Renal function improving slowly.          Medications reviewed:  Current Facility-Administered Medications   Medication Dose Route Frequency    nystatin (MYCOSTATIN) 100,000 unit/mL oral suspension 500,000 Units  500,000 Units Oral QID    sodium bicarbonate (8.4%) 150 mEq in dextrose 5% 1,000 mL infusion   IntraVENous CONTINUOUS    lactulose (CHRONULAC) 10 gram/15 mL solution 30 mL  20 g Oral DAILY    midodrine (PROAMATINE) tablet 15 mg  15 mg Oral Q8H    octreotide (SANDOSTATIN) 500 mcg in 0.9% sodium chloride 500 mL infusion  50 mcg/hr IntraVENous CONTINUOUS    sodium bicarbonate tablet 650 mg  650 mg Oral BID    0.9% sodium chloride infusion 250 mL  250 mL IntraVENous PRN    albumin human 25% (BUMINATE) solution 25 g  25 g IntraVENous Q6H    [Held by provider] oxyCODONE IR (ROXICODONE) tablet 5 mg  5 mg Oral Q4H PRN    [Held by provider] cefTRIAXone (ROCEPHIN) 2 g in 0.9% sodium chloride 20 mL IV syringe  2 g IntraVENous Q24H ARIPiprazole (ABILIFY) tablet 2 mg  2 mg Oral DAILY    buPROPion SR (WELLBUTRIN SR) tablet 150 mg  150 mg Oral DAILY    [Held by provider] LORazepam (ATIVAN) tablet 1 mg  1 mg Oral BID PRN    pantoprazole (PROTONIX) 40 mg in 0.9% sodium chloride 10 mL injection  40 mg IntraVENous ACB    sodium chloride (NS) flush 5-40 mL  5-40 mL IntraVENous Q8H    sodium chloride (NS) flush 5-40 mL  5-40 mL IntraVENous PRN    acetaminophen (TYLENOL) tablet 650 mg  650 mg Oral Q6H PRN    Or    acetaminophen (TYLENOL) suppository 650 mg  650 mg Rectal Q6H PRN    polyethylene glycol (MIRALAX) packet 17 g  17 g Oral DAILY PRN    ondansetron (ZOFRAN ODT) tablet 4 mg  4 mg Oral Q8H PRN    Or    ondansetron (ZOFRAN) injection 4 mg  4 mg IntraVENous Q6H PRN    heparin (porcine) injection 5,000 Units  5,000 Units SubCUTAneous Q8H    L.acidophilus-paracasei-S.thermophil-bifidobacter (RISAQUAD) 8 billion cell capsule  1 Capsule Oral DAILY    nicotine (NICODERM CQ) 21 mg/24 hr patch 1 Patch  1 Patch TransDERmal DAILY        Objective:   Vitals:  Visit Vitals  BP (!) 104/59   Pulse 79   Temp 98.4 °F (36.9 °C)   Resp 17   Ht 5' 3\" (1.6 m)   Wt 44.3 kg (97 lb 9.6 oz)   SpO2 94%   BMI 17.29 kg/m²     Temp (24hrs), Av.6 °F (37 °C), Min:98.2 °F (36.8 °C), Max:98.9 °F (37.2 °C)      O2 Device: None (Room air)    Last 24hr Input/Output:    Intake/Output Summary (Last 24 hours) at 2022 0810  Last data filed at 2022 0700  Gross per 24 hour   Intake 3998.34 ml   Output 2530 ml   Net 1468.34 ml          Physical Exam:  General: Jaundiced, sleeping conmfortably, say no to pain  HEENT:  sclerae icteric,  Neck: Supple  Lungs : Clears to auscultation Bilaterally, Normal respiratory effort  CVS: RRR, S1 S2 normal, No rub, no  LE edema  Abdomen: Soft, Non tender,   Ascitis+, paredes, abdomianl drain+  : paredes  Extremities: No edema  Skin: jaundice  Neurologic: sleeping,     Lab Data Reviewed:    Recent Results (from the past 24 hour(s))   GLUCOSE, POC    Collection Time: 09/05/22 11:55 PM   Result Value Ref Range    Glucose (POC) 131 (H) 65 - 117 mg/dL    Performed by Veronica Chu          Assessment/Plan:   Principal Problem:    Alcoholic cirrhosis of liver with ascites ([facility] [address] (8/30/2022)    Active Problems:    Severe protein-calorie malnutrition ([facility] [address] (8/31/2022)     Acute kidney injury -cr 0.4 in early August,   -RANDY/possible ATN due to intravascular volume depletion in setting of poor po intake, hypotension /third spacing and possible sepsis/NSAIDs. Hepatorenal in DDx . Low FeNa-low urine output  Possible HRS. Not a candidate liver transplant Per Dr Cuca Marks. Not a candidate for RRT. -renal function improving  -c/wMidodrine 15 mg tid  -c/w octreotide-last day tomorrow  -c/w with IV albumin  -c/w  po bicarb if able to take po, otherwise agree with IVF with bicarb   -off tube feeding-nutritional support  -strict Is and Os  -avoid nephrotoxins, IV contrast/NSAIDs  -dose meds per GFR  -f/up  renal function  -treat infections, has persistent leukocytosis     Hypotension-with h/o HTN on Coreg, due to effective intravascular volume depletion  -on prednisone per med list, but patient denies taking that.  -midodrine     Hyponatremia, due to liver cirrhosis and volume depletion, chronic since early August  -improving with IVF  -Avoid rapid correction  -improved    Hypokalemia   -replete prn    Alcoholic liver cirrhosis-decompensated. -Hypoalbuminemia/ascitis/coagulopathy-nl ammonia  -hepatology following-not a candidate for liver transplant. Poor prognosis  -ammonia higher, on lactulose    Anemia  -high iron stores  -Hb has dropped, might be due to hydration, r/o bleed  -transfuse for Hb<7    Persistent Leukocytosis: off Rocephin-source of infection?  Might have UTI-manegaement per primary team    Hypoglycemia, off TF-monitor-changed IVF to D5 with bicarb    Noted DNR now    Poor prognosis  ___________________________________________________    Total time spent with patient:  [] 15   [] 25   [x] 35   []  __ minutes    [] Critical Care Provided    Care Plan discussed with:RN and Dr Batool Talavera    [x] Patient   [] Family    [] Care Manager   [] Consultant/Specialist :      []   >50% of visit spent in counseling and coordination of care   (Discussed [] CODE status,  [] Care Plan, [] D/C Planning)    ___________________________________________________    Attending Physician: Natalia Loving, [id number] [facility]

## 2022-09-06 NOTE — PROGRESS NOTES
Transitions of Care Plan  RUR: 18% - moderate  Clinical Update: paracentesis drain w/ output; hypotensive; minimal PO intake  Consults: Nephrology; Hepatology; Therapy  Baseline: independent; resides alone; no local social support; hx ETOH  Barrier(s) to Discharge: medical  Disposition: pending medical progress - IPR v LTC (no SNF benefit)  Estimated Discharge Date: 2+ days    CM received update from Attending MD during IDR. Patient not progressing well. Poor PO intake. Hypotensive. Paracentesis drain remains with over 1L output. Patient oriented to self. CM will continue to follow.     Eleazar Leonard, MPH  Care Manager [facility]  Available via [employer] or

## 2022-09-06 NOTE — PROGRESS NOTES
[address] [facility], MD, FACP, Ayesha Estelle Fernandez, [place]      Babak Cameron, EDITH Michaels, ACN-BC     April S Bridger, [facility]   ERIC Patel, [facility]       Eleonora Edgar Arley De Srinivasan 136    at [address], [facility] Remington Watson  22.    [phone]    FAX: [address] [place] [address]    at [address], [address], [address]    [phone]    FAX: [phone]       HEPATOLOGY PROGRESS NOTE  The patient is a 65 y.o.  female with a multi-decade history of consuming 1 bottle of wine per night. She was told by per medical provider in the past to cut back on alcohol use. She developed jaundice and scleral icterus 12 days ago. She has developed weakness and is unable to walk. She lives alone and has no family in the area. This is her first ever hospitalization for alcohol related illness. In the ED Laboratory studies were significant for:    WBC 29.9, HB 9.8 gms, , Sna 129, Scr 4.3 mg, AST 91, ALT 35, , TBILI 29.7 mg, INR 1.8, Sammonia 11,     Imaging of the liver with CT scan demonstrated cirrhosis with a large volume of ascites. I spoke with sister who is in [facility] and plans to visit next week. 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 She is too muscle wasted and has and RANDY  Paracentesis catheter placed and is draining about 1L of ascites per day. Scr has started to come down from 4 to 2.5 mg.    U/O remains good     Feeding tube was placed  and she was getting tube feedings.   She removed the feeding tube and said she can eat on her own. She is getting less than 2 containers of Ensure daily. Not enough for her to survive. Hepatology Plan:  Strongly suggest we replace Dobhoff feeding tube and bridle in place so she cannot remove this. IF she needs to be sedated for this process that is fine. Bottom line. .. She will not survive without 1 month of tube feeds. All ascites has resolved and now that SCr coming down and marking urine can remove paracentesis catheter. Position her with puncture side facing up for 12 hours so site closes and does not leak    Repeat INR so we can recalculate MELD to assess liver function    ASSESSMENT AND PLAN:  Cirrhosis  The diagnosis of cirrhosis is based upon imaging, laboratory studies, complications of cirrhosis. Cirrhosis is secondary to alcohol. The CTP is 10. Child class C. The MELD score is 39. Based upon the MELD and CTP scores the patient has a mortality of about 50-70% within the next 90 days   She is not a candidate for LT at this time because of severe malnutrition and advanced muscle wasting. Alcohol liver disease  Suspect the patient has alcohol induced liver disease based upon a history of consuming significant alcohol on a daily basis for many years, pattern of AST>ALT,   Serologies for other causes of chronic liver disease need to be obtained at some point    Ascites   Ascites developed for the first time in 8/2022. Paracentesis catheter placed and >5L ascites drained so far. Continue IV albumin at usual dose    Screening for Esophageal varices   The patient has not had an EGD to screen for varices. Will perform EGD at some point during the hospitalization. Not bleeding. Not top priority. Risk of sedation given severe malnutrition and muscle wasting is considerable. Hepatic encephalopathy   Overt HE has not developed to date.     There is no reason for treatment with lactulose of xifaxan  There is no reason to give her diarrhea. There is no need to restrict dietary protein at this time. Coagulopathy  This is secondary to cirrhosis, malnutrition,   Will treat with 3 doses of Vitamin K over 3 days. There is no evidence of bleeding. No need for FFP at this time. Malnutrition/muscle wasting  The patient has severe protein-calorie malnutrition with severe muscle wasting of the upper extremities, lower extremities, facial muscles  Malnutrition and muscle wasting is due to Alcohol abuse and poor caloric intake, cirrhosis and poor caloric utilization, refractory ascites and inability to eat sufficient calories,     The patient needs tube feedings. It is impossible for her to get any significant amount of calories by eating   Consult nutrition service for formula and to guide volume after feeding tube placed. PHYSICAL EXAMINATION:  VS: per nursing note  General:  Ill appearing  Eyes:  Sclera deeply icteric  ENT:  No oral lesions. Thyroid normal.  Nodes:  No adenopathy. Skin:  Numerous spider angiomata. Jaundice. Respiratory:  Lungs clear to auscultation. Cardiovascular:  Regular heart rate. Abdomen:  Distended with obvious ascites. Extremities:  No lower extremity edema. Severe muscle wasting. Neurologic:  Alert and oriented. Cranial nerves grossly intact. No asterixis.     LABORATORY:   Latest Reference Range & Units 9/3/22 05:18 9/4/22 03:28 9/5/22 05:01 9/6/22 06:53 9/6/22 09:15   WBC 3.6 - 11.0 K/uL 21.4 (H)  22.2 (H)  22.8 (H)   HGB 11.5 - 16.0 g/dL 9.9 (L)  10.1 (L)  8.7 (L)   PLATELET 840 - 354 K/uL 149 (L)  106 (L)  89 (L)   Sodium 136 - 145 mmol/L 137 139 138 140 140   Potassium 3.5 - 5.1 mmol/L 3.0 (L) 3.6 3.5 2.2 (LL) 2.6 (LL)   Chloride 97 - 108 mmol/L 105 108 108 96 (L) 102   CO2 21 - 32 mmol/L 20 (L) 20 (L) 18 (L) 38 (H) 29   Glucose 65 - 100 mg/dL 142 (H) 102 (H) 127 (H) 533 (H) 132 (H)   BUN 6 - 20 MG/DL 67 (H) 70 (H) 62 (H) 46 (H) 54 (H)   Creatinine 0.55 - 1.02 MG/DL 4.44 (H) 3.71 (H) 3.16 (H) 2.12 (H) 2.51 (H)   Bilirubin, total 0.2 - 1.0 MG/DL   19.3 (H)     Albumin 3.5 - 5.0 g/dL 3.8 4.1 4.2  4.1 3.0 (L)    ALT 12 - 78 U/L   14     AST 15 - 37 U/L   45 (H)     Alk.  phosphatase 45 - 117 U/L   100     (LL): Data is critically low  (H): Data is abnormally high  (L): Data is abnormally low      Sirena Hung MD  [facility] of [address], [address] [facility] East BranchDavid grecoиванshabbir  22.  [phone]  [address]

## 2022-09-06 NOTE — PROGRESS NOTES
Spiritual Care Assessment/Progress Note  ST. [address]      NAME: Nan Rosales      MRN: [id number]  AGE: 65 y.o. SEX: female  Denominational Affiliation: No Baptist   Language: English     9/6/2022     Total Time (in minutes): 10     Spiritual Assessment begun in [facility] 4 CV SERVICES UNIT through conversation with:         [x]Patient        [] Family    [] Friend(s)        Reason for Consult: Initial/Spiritual assessment, patient floor     Spiritual beliefs: (Please include comment if needed)     [] Identifies with a lisa tradition:         [] Supported by a lisa community:            [x] Claims no spiritual orientation:           [] Seeking spiritual identity:                [] Adheres to an individual form of spirituality:           [] Not able to assess:                           Identified resources for coping:      [] Prayer                               [] Music                  [] Guided Imagery     [x] Family/friends                 [] Pet visits     [] Devotional reading                         [] Unknown     [] Other:                                               Interventions offered during this visit: (See comments for more details)    Patient Interventions: Initial visit           Plan of Care:     [] Support spiritual and/or cultural needs    [] Support AMD and/or advance care planning process      [] Support grieving process   [] Coordinate Rites and/or Rituals    [] Coordination with community clergy   [] No spiritual needs identified at this time   [] Detailed Plan of Care below (See Comments)  [] Make referral to Music Therapy  [] Make referral to Pet Therapy     [] Make referral to Addiction services  [] Make referral to [facility]  [] Make referral to Spiritual Care Partner  [] No future visits requested        [x] Contact Spiritual Care for further referrals     Comments:    visit as a result of Cherie Dejesus length of stay. I reviewed the patient's chart prior to this encounter.  I initiated a relationship of care and concern; explored their spiritual needs, distress, and coping strategies. Assessment: Adjusting to current medical condition     We discussed their spirituality and feelings/concerns. Ms. Clementine James shared that she has a friend who is offering support by caring for her pets. No further spiritual needs were identified during this visit. I offered words of encouragement and blessing for continued healing. Marcy Lin expressed appreciation for my care and information about our services. Please contact Spiritual Care services for any additional needs ([phone])    _________________________________________  Rev.  Khadijah Bynum, Staff Merlene Pina, MS, [facility]

## 2022-09-06 NOTE — PROGRESS NOTES
0730: Bedside shift change report given to Caye Schirmer (oncoming nurse) by 1200 PEX Card (offgoing nurse). Report included the following information SBAR, Kardex, Procedure Summary, Intake/Output, MAR, and Recent Results. 1332: Pt's BP 99/44. 1400 Midodrine given. Perfect served Dr. Sivan Hansen. MD aware of MAPs in 46s. No orders received. 1345: Trevino catheter removed per MD order. 1930: Bedside shift change report given to 1200 PEX Card (oncoming nurse) by Caye Schirmer (offgoing nurse). Report included the following information SBAR, Kardex, Intake/Output, MAR, and Recent Results.

## 2022-09-06 NOTE — PROGRESS NOTES
[facility] [facility]  Hospitalist Group                                                                                          Hospitalist Progress Note  Bartolome Bolanos MD  Answering service: [phone] or [phone] from in house phone        Date of Service:  2022  NAME:  Mickey Muniz  :  1960  MRN:  [id number]      Admission Summary:   Per H&P, This is a 61-year-old woman with a past medical history significant for hypertension, alcoholic liver cirrhosis, who presented at the emergency room with abdominal pain. The patient presented at [facility] Emergency Room. When the patient arrived at the emergency room, the patient was found to have decompensation of liver cirrhosis. The hospitalist service at [facility] was asked to directly admit the patient to [facility], so that the patient can be seen by the hepatologist.  The patient came to the emergency room at [facility], because of lack of bed on the on the medical floor. When the patient arrived at the emergency room, the patient's lab work shows significant abnormality. She was subsequently referred to the hospitalist service for admission. No record of prior admission to this hospital.  It was reported that the patient underwent abdominal paracentesis before coming to [facility].  The amount of fluid removed is not known. The patient's abdominal pain is diffuse in location, dull ache, constant, 7/10 in severity with no known aggravating or relieving factors associated with nausea, but no vomiting, no diarrhea, and no constipation. Interval history / Subjective:   Complains of diffuse abdominal discomfort, PO intake remains poor no n/v/d, no dyspnea. Intermittently lethargic.      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 Serum ammonia was mildly elevated. Likely developed HE. Will continue lactulose at low dose with goal 2 bm's daily. Severe protein calorie malnutrition  Muscle wasting, bitemporal wasting. Due to her disease process  DHT advanced to duodenum 9/1 and resumed TFs - FT came out overnight, could not be replaced. Became more alert after lactulose so now on diet but PO intake remains poor. Refusing TF placement now    Acute kidney injury  Suspected ATN, IVVD due to poor PO intake, hypotension, NSAID use. HRS on ddx. No improvement with IV albumin but continue the same. IVF resumed 9/4 due to NGT being out; if PO  intake is good, we can d/c this    Hypotension  Continue midodrine, IV albumin and IV fluids as above    Anemia  Iron studies consistent with chronic disease  Transfuse PRN - unit ordered for 9/2  Vitamin K per hepatology for coagulopathy    Persistent leukocytosis: unclear etiology, no evidence of infection clinically. Afebrile. Has been on empiric IV ceftriaxone for 6 days, stopped 9/4. Sent UA and blood cultures. UA with yeast and bacteria, minimal pyuria, unlikely source of leukocytosis. Remove paredes. Follow-up blood cultures. Send peritoneal fluid for culture. History of HTN    Labs off today - repeat before basic any treatments on these    Code status: DNR - continue full current care otherwise but do not escalate (if pt rapidly deteriorates make comfort care and inform sister over the phone 24/7). See 9/2 PN for my discussion with her.   Prophylaxis: SCD  Care Plan discussed with: RN     Hospital Problems  Date Reviewed: 8/30/2022            Codes Class Noted POA    Severe protein-calorie malnutrition ([facility] [address] (Chronic) ICD-10-CM: Y83  ICD-9-CM: 984  8/31/2022 Yes        * (Principal) Alcoholic cirrhosis of liver with ascites [facility]) ICD-10-CM: K70.31  ICD-9-CM: 571.2  8/30/2022 Yes       Review of Systems:   Review of systems not obtained due to patient factors. Vital Signs:    Last 24hrs VS reviewed since prior progress note. Most recent are:  Visit Vitals  BP (!) 104/59   Pulse 79   Temp 98.4 °F (36.9 °C)   Resp 17   Ht 5' 3\" (1.6 m)   Wt 45.7 kg (100 lb 11.2 oz)   SpO2 94%   BMI 17.84 kg/m²         Intake/Output Summary (Last 24 hours) at 9/6/2022 1034  Last data filed at 9/6/2022 0700  Gross per 24 hour   Intake 5075.42 ml   Output 3080 ml   Net 1995.42 ml          Physical Examination:     I had a face to face encounter with this patient and independently examined them on 9/6/2022 as outlined below:          Constitutional:  NAD, cachectic, chronically-ill appearing   ENT:  Oral mucosa moist, Icteric sclerae    Resp:  CTA bilaterally. No wheezing/rhonchi/rales. No accessory muscle use.     CV:  Regular rhythm, normal rate, soft systolic murmur     GI: Soft, mildly distended, NT    Musculoskeletal:    No edema, warm, 2+ pulses throughout jaundiced    Neurologic:  Grossly non-focal, alert currently  Psych: not anxious nor agitated     Skin: jaundiced       Data Review:    Review and/or order of clinical lab test  Review and/or order of tests in the radiology section of CPT      Labs:     Recent Labs     09/06/22 0915 09/05/22  0501   WBC 22.8* 22.2*   HGB 8.7* 10.1*   HCT 24.3* 29.3*   PLT 89* 106*       Recent Labs     09/06/22  0653 09/05/22  0501 09/04/22  0328    138 139   K 2.2* 3.5 3.6   CL 96* 108 108   CO2 38* 18* 20*   BUN 46* 62* 70*   CREA 2.12* 3.16* 3.71*   * 127* 102*   CA 7.1* 9.1 9.4   MG  --  1.7 1.9   PHOS 1.3* 2.6 3.1       Recent Labs     09/06/22  0653 09/05/22  0501 09/04/22  0328   ALT  --  14  --    AP  --  100  --    TBILI  --  19.3*  --    TP  --  5.3*  --    ALB 3.0* 4.2  4.1 4.1   GLOB  --  1.1*  --        No results for input(s): INR, PTP, APTT, INREXT, INREXT in the last 72 hours. No results for input(s): FE, TIBC, PSAT, FERR in the last 72 hours. Lab Results   Component Value Date/Time    Folate 14.7 08/30/2022 01:52 AM        No results for input(s): PH, PCO2, PO2 in the last 72 hours. No results for input(s): CPK, CKNDX, TROIQ in the last 72 hours.     No lab exists for component: CPKMB  No results found for: CHOL, CHOLX, CHLST, CHOLV, HDL, HDLP, LDL, LDLC, DLDLP, TGLX, TRIGL, TRIGP, CHHD, CHHDX  Lab Results   Component Value Date/Time    Glucose (POC) 131 (H) 09/05/2022 11:55 PM    Glucose (POC) 171 (H) 09/03/2022 12:06 AM    Glucose (POC) 154 (H) 09/02/2022 01:43 PM    Glucose (POC) 113 09/02/2022 06:11 AM     Lab Results   Component Value Date/Time    Color DARK YELLOW 09/04/2022 05:22 PM    Appearance TURBID (A) 09/04/2022 05:22 PM    Specific gravity 1.017 09/04/2022 05:22 PM    pH (UA) 5.5 09/04/2022 05:22 PM    Protein 100 (A) 09/04/2022 05:22 PM    Glucose Negative 09/04/2022 05:22 PM    Ketone Negative 09/04/2022 05:22 PM    Urobilinogen 1.0 09/04/2022 05:22 PM    Nitrites Positive (A) 09/04/2022 05:22 PM    Leukocyte Esterase LARGE (A) 09/04/2022 05:22 PM    Epithelial cells FEW 09/04/2022 05:22 PM    Bacteria 1+ (A) 09/04/2022 05:22 PM    WBC 5-10 09/04/2022 05:22 PM    RBC 5-10 09/04/2022 05:22 PM         Medications Reviewed:     Current Facility-Administered Medications   Medication Dose Route Frequency    potassium bicarb-citric acid (EFFER-K) tablet 20 mEq  20 mEq Oral NOW    nystatin (MYCOSTATIN) 100,000 unit/mL oral suspension 500,000 Units  500,000 Units Oral QID    sodium bicarbonate (8.4%) 150 mEq in dextrose 5% 1,000 mL infusion   IntraVENous CONTINUOUS    lactulose (CHRONULAC) 10 gram/15 mL solution 30 mL  20 g Oral DAILY    midodrine (PROAMATINE) tablet 15 mg  15 mg Oral Q8H    octreotide (SANDOSTATIN) 500 mcg in 0.9% sodium chloride 500 mL infusion  50 mcg/hr IntraVENous CONTINUOUS    sodium bicarbonate tablet 650 mg  650 mg Oral BID    0.9% sodium chloride infusion 250 mL  250 mL IntraVENous PRN    albumin human 25% (BUMINATE) solution 25 g  25 g IntraVENous Q6H    [Held by provider] oxyCODONE IR (ROXICODONE) tablet 5 mg  5 mg Oral Q4H PRN    [Held by provider] cefTRIAXone (ROCEPHIN) 2 g in 0.9% sodium chloride 20 mL IV syringe  2 g IntraVENous Q24H    ARIPiprazole (ABILIFY) tablet 2 mg  2 mg Oral DAILY    buPROPion SR (WELLBUTRIN SR) tablet 150 mg  150 mg Oral DAILY    [Held by provider] LORazepam (ATIVAN) tablet 1 mg  1 mg Oral BID PRN    pantoprazole (PROTONIX) 40 mg in 0.9% sodium chloride 10 mL injection  40 mg IntraVENous ACB    sodium chloride (NS) flush 5-40 mL  5-40 mL IntraVENous Q8H    sodium chloride (NS) flush 5-40 mL  5-40 mL IntraVENous PRN    acetaminophen (TYLENOL) tablet 650 mg  650 mg Oral Q6H PRN    Or    acetaminophen (TYLENOL) suppository 650 mg  650 mg Rectal Q6H PRN    polyethylene glycol (MIRALAX) packet 17 g  17 g Oral DAILY PRN    ondansetron (ZOFRAN ODT) tablet 4 mg  4 mg Oral Q8H PRN    Or    ondansetron (ZOFRAN) injection 4 mg  4 mg IntraVENous Q6H PRN    heparin (porcine) injection 5,000 Units  5,000 Units SubCUTAneous Q8H    L.acidophilus-paracasei-S.thermophil-bifidobacter (RISAQUAD) 8 billion cell capsule  1 Capsule Oral DAILY    nicotine (NICODERM CQ) 21 mg/24 hr patch 1 Patch  1 Patch TransDERmal DAILY     ______________________________________________________________________  EXPECTED LENGTH OF STAY: 4d 16h  ACTUAL LENGTH OF STAY:          7                 Chris Alonzo MD

## 2022-09-07 LAB
ALBUMIN SERPL-MCNC: 3.6 G/DL (ref 3.5–5)
ANION GAP SERPL CALC-SCNC: 10 MMOL/L (ref 5–15)
BASOPHILS # BLD: 0 K/UL (ref 0–0.1)
BASOPHILS NFR BLD: 0 % (ref 0–1)
BUN SERPL-MCNC: 53 MG/DL (ref 6–20)
BUN/CREAT SERPL: 22 (ref 12–20)
CALCIUM SERPL-MCNC: 8.4 MG/DL (ref 8.5–10.1)
CHLORIDE SERPL-SCNC: 98 MMOL/L (ref 97–108)
CO2 SERPL-SCNC: 31 MMOL/L (ref 21–32)
CREAT SERPL-MCNC: 2.39 MG/DL (ref 0.55–1.02)
DIFFERENTIAL METHOD BLD: ABNORMAL
EOSINOPHIL # BLD: 0.3 K/UL (ref 0–0.4)
EOSINOPHIL NFR BLD: 1 % (ref 0–7)
ERYTHROCYTE [DISTWIDTH] IN BLOOD BY AUTOMATED COUNT: 21.7 % (ref 11.5–14.5)
GLUCOSE SERPL-MCNC: 113 MG/DL (ref 65–100)
HCT VFR BLD AUTO: 25.1 % (ref 35–47)
HGB BLD-MCNC: 8.7 G/DL (ref 11.5–16)
IMM GRANULOCYTES # BLD AUTO: 0.3 K/UL (ref 0–0.04)
IMM GRANULOCYTES NFR BLD AUTO: 1 % (ref 0–0.5)
LYMPHOCYTES # BLD: 2.4 K/UL (ref 0.8–3.5)
LYMPHOCYTES NFR BLD: 9 % (ref 12–49)
MAGNESIUM SERPL-MCNC: 1.5 MG/DL (ref 1.6–2.4)
MCH RBC QN AUTO: 34.4 PG (ref 26–34)
MCHC RBC AUTO-ENTMCNC: 34.7 G/DL (ref 30–36.5)
MCV RBC AUTO: 99.2 FL (ref 80–99)
MONOCYTES # BLD: 2.4 K/UL (ref 0–1)
MONOCYTES NFR BLD: 9 % (ref 5–13)
NEUTS SEG # BLD: 20.8 K/UL (ref 1.8–8)
NEUTS SEG NFR BLD: 80 % (ref 32–75)
NRBC # BLD: 0 K/UL (ref 0–0.01)
NRBC BLD-RTO: 0 PER 100 WBC
PHOSPHATE SERPL-MCNC: 1.5 MG/DL (ref 2.6–4.7)
PLATELET # BLD AUTO: 86 K/UL (ref 150–400)
PMV BLD AUTO: 11.5 FL (ref 8.9–12.9)
POTASSIUM SERPL-SCNC: 3.4 MMOL/L (ref 3.5–5.1)
RBC # BLD AUTO: 2.53 M/UL (ref 3.8–5.2)
RBC MORPH BLD: ABNORMAL
RBC MORPH BLD: ABNORMAL
SODIUM SERPL-SCNC: 139 MMOL/L (ref 136–145)
WBC # BLD AUTO: 26.2 K/UL (ref 3.6–11)

## 2022-09-07 PROCEDURE — 36415 COLL VENOUS BLD VENIPUNCTURE: CPT

## 2022-09-07 PROCEDURE — 74011250636 HC RX REV CODE- 250/636: Performed by: INTERNAL MEDICINE

## 2022-09-07 PROCEDURE — 74011000250 HC RX REV CODE- 250: Performed by: HOSPITALIST

## 2022-09-07 PROCEDURE — 74011250637 HC RX REV CODE- 250/637: Performed by: HOSPITALIST

## 2022-09-07 PROCEDURE — 74011000250 HC RX REV CODE- 250: Performed by: INTERNAL MEDICINE

## 2022-09-07 PROCEDURE — 97530 THERAPEUTIC ACTIVITIES: CPT

## 2022-09-07 PROCEDURE — C9113 INJ PANTOPRAZOLE SODIUM, VIA: HCPCS | Performed by: INTERNAL MEDICINE

## 2022-09-07 PROCEDURE — 85025 COMPLETE CBC W/AUTO DIFF WBC: CPT

## 2022-09-07 PROCEDURE — 74011250636 HC RX REV CODE- 250/636: Performed by: HOSPITALIST

## 2022-09-07 PROCEDURE — 74011250637 HC RX REV CODE- 250/637: Performed by: INTERNAL MEDICINE

## 2022-09-07 PROCEDURE — P9047 ALBUMIN (HUMAN), 25%, 50ML: HCPCS | Performed by: INTERNAL MEDICINE

## 2022-09-07 PROCEDURE — 74011636637 HC RX REV CODE- 636/637: Performed by: INTERNAL MEDICINE

## 2022-09-07 PROCEDURE — 83735 ASSAY OF MAGNESIUM: CPT

## 2022-09-07 PROCEDURE — 80069 RENAL FUNCTION PANEL: CPT

## 2022-09-07 PROCEDURE — 97116 GAIT TRAINING THERAPY: CPT

## 2022-09-07 PROCEDURE — 65660000001 HC RM ICU INTERMED STEPDOWN

## 2022-09-07 PROCEDURE — 51798 US URINE CAPACITY MEASURE: CPT

## 2022-09-07 RX ORDER — ALBUMIN HUMAN 250 G/1000ML
25 SOLUTION INTRAVENOUS EVERY 12 HOURS
Status: DISCONTINUED | OUTPATIENT
Start: 2022-09-07 | End: 2022-09-10

## 2022-09-07 RX ORDER — MAGNESIUM SULFATE HEPTAHYDRATE 40 MG/ML
2 INJECTION, SOLUTION INTRAVENOUS ONCE
Status: COMPLETED | OUTPATIENT
Start: 2022-09-07 | End: 2022-09-07

## 2022-09-07 RX ADMIN — SODIUM BICARBONATE 650 MG: 650 TABLET ORAL at 10:43

## 2022-09-07 RX ADMIN — BUPROPION HYDROCHLORIDE 150 MG: 150 TABLET, EXTENDED RELEASE ORAL at 10:43

## 2022-09-07 RX ADMIN — MAGNESIUM SULFATE HEPTAHYDRATE 2 G: 40 INJECTION, SOLUTION INTRAVENOUS at 10:46

## 2022-09-07 RX ADMIN — OCTREOTIDE ACETATE 50 MCG/HR: 500 INJECTION, SOLUTION INTRAVENOUS; SUBCUTANEOUS at 23:03

## 2022-09-07 RX ADMIN — Medication 1 CAPSULE: at 10:43

## 2022-09-07 RX ADMIN — HEPARIN SODIUM 5000 UNITS: 5000 INJECTION INTRAVENOUS; SUBCUTANEOUS at 23:04

## 2022-09-07 RX ADMIN — MIDODRINE HYDROCHLORIDE 15 MG: 5 TABLET ORAL at 15:04

## 2022-09-07 RX ADMIN — SODIUM BICARBONATE 650 MG: 650 TABLET ORAL at 18:04

## 2022-09-07 RX ADMIN — MIDODRINE HYDROCHLORIDE 15 MG: 5 TABLET ORAL at 23:04

## 2022-09-07 RX ADMIN — LACTULOSE 30 ML: 20 SOLUTION ORAL at 10:42

## 2022-09-07 RX ADMIN — PANTOPRAZOLE SODIUM 40 MG: 40 INJECTION, POWDER, FOR SOLUTION INTRAVENOUS at 07:10

## 2022-09-07 RX ADMIN — SODIUM CHLORIDE, PRESERVATIVE FREE 10 ML: 5 INJECTION INTRAVENOUS at 07:10

## 2022-09-07 RX ADMIN — SODIUM BICARBONATE: 84 INJECTION, SOLUTION INTRAVENOUS at 09:47

## 2022-09-07 RX ADMIN — SODIUM CHLORIDE, PRESERVATIVE FREE 10 ML: 5 INJECTION INTRAVENOUS at 15:08

## 2022-09-07 RX ADMIN — NYSTATIN 500000 UNITS: 100000 SUSPENSION ORAL at 10:42

## 2022-09-07 RX ADMIN — ARIPIPRAZOLE 2 MG: 2 TABLET ORAL at 10:43

## 2022-09-07 RX ADMIN — NYSTATIN 500000 UNITS: 100000 SUSPENSION ORAL at 15:04

## 2022-09-07 RX ADMIN — OCTREOTIDE ACETATE 50 MCG/HR: 500 INJECTION, SOLUTION INTRAVENOUS; SUBCUTANEOUS at 12:52

## 2022-09-07 RX ADMIN — SODIUM CHLORIDE, PRESERVATIVE FREE 10 ML: 5 INJECTION INTRAVENOUS at 23:05

## 2022-09-07 RX ADMIN — HEPARIN SODIUM 5000 UNITS: 5000 INJECTION INTRAVENOUS; SUBCUTANEOUS at 18:03

## 2022-09-07 RX ADMIN — ALBUMIN (HUMAN) 25 G: 0.25 INJECTION, SOLUTION INTRAVENOUS at 23:04

## 2022-09-07 RX ADMIN — NYSTATIN 500000 UNITS: 100000 SUSPENSION ORAL at 18:03

## 2022-09-07 RX ADMIN — MIDODRINE HYDROCHLORIDE 15 MG: 5 TABLET ORAL at 07:10

## 2022-09-07 RX ADMIN — POTASSIUM PHOSPHATE, MONOBASIC AND POTASSIUM PHOSPHATE, DIBASIC: 224; 236 INJECTION, SOLUTION, CONCENTRATE INTRAVENOUS at 12:52

## 2022-09-07 RX ADMIN — NYSTATIN 500000 UNITS: 100000 SUSPENSION ORAL at 23:04

## 2022-09-07 NOTE — PROGRESS NOTES
Problem: Mobility Impaired (Adult and Pediatric)  Goal: *Acute Goals and Plan of Care (Insert Text)  Description: FUNCTIONAL STATUS PRIOR TO ADMISSION: Patient was modified independent using a rollator for functional mobility per patient, however patient is questionable historian. Patient notably with gross muscle wasting in LEs and UEs. HOME SUPPORT PRIOR TO ADMISSION: The patient lived alone with no local support. Physical Therapy Goals  Initiated 9/1/2022  1. Patient will move from supine to sit and sit to supine  in bed with modified independence within 7 day(s). 2.  Patient will transfer from bed to chair and chair to bed with modified independence using the least restrictive device within 7 day(s). 3.  Patient will perform sit to stand with modified independence within 7 day(s). 4.  Patient will ambulate with modified independence for 50 feet with the least restrictive device within 7 day(s). 5.  Patient will ascend/descend 3 stairs with bilateral handrail(s) with modified independence within 7 day(s). Outcome: Progressing Towards Goal   PHYSICAL THERAPY TREATMENT  Patient: Joss Mtz (45 y.o. female)  Date: 9/7/2022  Diagnosis: Alcoholic cirrhosis of liver with ascites (HCC) [X13.50] Alcoholic cirrhosis of liver with ascites [facility])      Precautions: Fall  Chart, physical therapy assessment, plan of care and goals were reviewed. ASSESSMENT  Patient continues with skilled PT services and is progressing towards goals. Patient found seated in chair and agreeable to PT. Soft BP but all VSS throughout session. Patient continues to demonstrate delayed but appropriate command processing/ following and appears more alert today. Patient completed sit <> stand with CGA, with cues for hand placement. Patient initiated ambulation to door, however experienced bowel incontinence and patient cued to ambulate to [facility], ambulating in total approximately 6 feet with min A and RW.  Patient requires manual advancement of RW as she is not strong enough to propel RW herself. Patient completed BM on BSC, then was assisted in sock and gown change. Patient ambulated 6 feet with RW and min A back to chair and positioned to safety with chair alarm in place and call bell in reach. Patient making progress towards goals, will require SNF at discharge for continued rehabilitation. Current Level of Function Impacting Discharge (mobility/balance): ambulates 6 feet x 2 with RW and min A     Other factors to consider for discharge: bowel incontinence         PLAN :  Patient continues to benefit from skilled intervention to address the above impairments. Continue treatment per established plan of care. to address goals. Recommendation for discharge: (in order for the patient to meet his/her long term goals)  Therapy up to 5 days/week in SNF setting    This discharge recommendation:  A follow-up discussion with the attending provider and/or case management is planned    IF patient discharges home will need the following DME: rolling walker       SUBJECTIVE:   Patient stated this is awful.     OBJECTIVE DATA SUMMARY:   Critical Behavior:  Neurologic State: Alert  Orientation Level: Oriented to person, Disoriented to place, Disoriented to situation, Oriented to time  Cognition: Follows commands  Safety/Judgement: Decreased insight into deficits, Fall prevention  Functional Mobility Training:  Bed Mobility:                    Transfers:  Sit to Stand: Contact guard assistance;Assist x1  Stand to Sit: Contact guard assistance;Assist x1        Bed to Chair: Minimum assistance;Assist x1;Additional time                    Balance:  Sitting: Intact; Without support  Sitting - Static: Good (unsupported)  Sitting - Dynamic: Good (unsupported)  Standing: Impaired; With support  Standing - Static: Fair;Constant support  Standing - Dynamic : Fair;Constant support  Ambulation/Gait Training:  Distance (ft): 6 Feet (ft) (6 feet x 2)  Assistive Device: Walker, rolling;Gait belt  Ambulation - Level of Assistance: Minimal assistance;Assist x1        Gait Abnormalities: Antalgic;Decreased step clearance;Trunk sway increased        Base of Support: Narrowed     Speed/Deana: Pace decreased (<100 feet/min)  Step Length: Right shortened;Left shortened                Pain Ratin/10 abdominal pain - RN aware     Activity Tolerance:   Fair, SpO2 stable on RA, requires rest breaks, and soft BP    After treatment patient left in no apparent distress:   Sitting in chair, Call bell within reach, and Bed / chair alarm activated    COMMUNICATION/COLLABORATION:   The patients plan of care was discussed with: Occupational therapist and Registered nurse.      Elaine Joseph, PT   Time Calculation: 40 mins

## 2022-09-07 NOTE — PROGRESS NOTES
[facility]                                                                                          Hospitalist Progress Note  Renuka Womack MD  Answering service: [phone] from in house phone        Date of Service:  2022  NAME:  Tierra Garcia  :  1960  MRN:  [id number]      Admission Summary:   Per H&P, This is a 81-year-old woman with a past medical history significant for hypertension, alcoholic liver cirrhosis, who presented at the emergency room with abdominal pain. The patient presented at [facility] Emergency Room. When the patient arrived at the emergency room, the patient was found to have decompensation of liver cirrhosis. The hospitalist service at [facility] was asked to directly admit the patient to [facility], so that the patient can be seen by the hepatologist.  The patient came to the emergency room at [facility], because of lack of bed on the on the medical floor. When the patient arrived at the emergency room, the patient's lab work shows significant abnormality. She was subsequently referred to the hospitalist service for admission. No record of prior admission to this hospital.  It was reported that the patient underwent abdominal paracentesis before coming to [facility].  The amount of fluid removed is not known. The patient's abdominal pain is diffuse in location, dull ache, constant, 7/10 in severity with no known aggravating or relieving factors associated with nausea, but no vomiting, no diarrhea, and no constipation. Interval history / Subjective:   Wants to advance diet, states she isn't eating much because she doesn't like the food. Denies pain, dyspnea, n/v/d.      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 Serum ammonia was mildly elevated. Likely developed HE. Will continue lactulose at low dose with goal 2 bm's daily. Severe protein calorie malnutrition  Muscle wasting, bitemporal wasting. Due to her disease process  DHT advanced to duodenum 9/1 and resumed TFs - FT came out overnight, could not be replaced. Became more alert after lactulose so now on diet but PO intake remains poor. Refusing TF placement now. Discussed at length with her to at least finish her Ensures otherwise the FT has to be reinserted else she won't survive due to malnutrition. Acute kidney injury  Suspected ATN, IVVD due to poor PO intake, hypotension, NSAID use. HRS on ddx. No improvement with IV albumin but continue the same. IVF resumed 9/4 due to NGT being out. D/C bicarb drip today as she is developing an alkalosis. Renal function now improving. Hypotension  Continue midodrine, IV albumin (now off) and IV fluids as above    Anemia  Iron studies consistent with chronic disease  Transfuse PRN - unit ordered for 9/2  Vitamin K per hepatology for coagulopathy    Persistent leukocytosis: unclear etiology, no evidence of infection clinically. Afebrile. Has been on empiric IV ceftriaxone for 6 days, stopped 9/4. Sent UA and blood cultures. UA with yeast and bacteria, minimal pyuria, unlikely source of leukocytosis. Removed paredes 9/6. Follow-up blood cultures. Peritoneal fluid cell counts now c/w peritonitis. Consider chest/abd CT but utility is low given she can't get contrast due to renal failure, and clinically does not appear infected.     History of HTN    Labs off today - repeat before basic any treatments on these    Code status: DNR - continue full current care otherwise but do not escalate (if pt rapidly deteriorates make comfort care and inform sister over the phone 24/7). See 9/2 PN for my discussion with her. Prophylaxis: SCD  Care Plan discussed with: RN     Hospital Problems  Date Reviewed: 8/30/2022            Codes Class Noted POA    Severe protein-calorie malnutrition ([facility] [address] (Chronic) ICD-10-CM: K92  ICD-9-CM: 216  8/31/2022 Yes        * (Principal) Alcoholic cirrhosis of liver with ascites ([facility] [address] ICD-10-CM: K70.31  ICD-9-CM: 571.2  8/30/2022 Yes       Review of Systems:   Review of systems not obtained due to patient factors. Vital Signs:    Last 24hrs VS reviewed since prior progress note. Most recent are:  Visit Vitals  BP (!) 107/55 (BP 1 Location: Right upper arm, BP Patient Position: Sitting)   Pulse 73   Temp 98 °F (36.7 °C)   Resp 18   Ht 5' 3\" (1.6 m)   Wt 49.7 kg (109 lb 9.6 oz)   SpO2 100%   BMI 19.41 kg/m²         Intake/Output Summary (Last 24 hours) at 9/7/2022 1136  Last data filed at 9/7/2022 5685  Gross per 24 hour   Intake 4377.99 ml   Output 1350 ml   Net 3027.99 ml          Physical Examination:     I had a face to face encounter with this patient and independently examined them on 9/7/2022 as outlined below:          Constitutional:  NAD, cachectic, chronically-ill appearing   ENT:  Oral mucosa moist, Icteric sclerae    Resp:  CTA bilaterally. No wheezing/rhonchi/rales. No accessory muscle use.     CV:  Regular rhythm, normal rate, soft systolic murmur     GI: Soft, more distended today, NT, bs+    Musculoskeletal:    No edema, warm, 2+ pulses throughout jaundiced    Neurologic:  Grossly non-focal, alert currently but intermittently drowsy  Psych: not anxious nor agitated     Skin: jaundiced       Data Review:    Review and/or order of clinical lab test  Review and/or order of tests in the radiology section of CPT      Labs:     Recent Labs     09/07/22  0004 09/06/22  0915   WBC 26.2* 22.8*   HGB 8.7* 8.7*   HCT 25.1* 24.3*   PLT 86* 89*       Recent Labs     09/07/22  0004 09/06/22  0915 09/06/22  8714 09/05/22  0501    140 140 138   K 3.4* 2.6* 2.2* 3.5   CL 98 102 96* 108   CO2 31 29 38* 18*   BUN 53* 54* 46* 62*   CREA 2.39* 2.51* 2.12* 3.16*   * 132* 533* 127*   CA 8.4* 9.0 7.1* 9.1   MG 1.5*  --   --  1.7   PHOS 1.5*  --  1.3* 2.6       Recent Labs     09/07/22  0004 09/06/22  0653 09/05/22  0501   ALT  --   --  14   AP  --   --  100   TBILI  --   --  19.3*   TP  --   --  5.3*   ALB 3.6 3.0* 4.2  4.1   GLOB  --   --  1.1*       No results for input(s): INR, PTP, APTT, INREXT, INREXT in the last 72 hours. No results for input(s): FE, TIBC, PSAT, FERR in the last 72 hours. Lab Results   Component Value Date/Time    Folate 14.7 08/30/2022 01:52 AM        No results for input(s): PH, PCO2, PO2 in the last 72 hours. No results for input(s): CPK, CKNDX, TROIQ in the last 72 hours.     No lab exists for component: CPKMB  No results found for: CHOL, CHOLX, CHLST, CHOLV, HDL, HDLP, LDL, LDLC, DLDLP, TGLX, TRIGL, TRIGP, CHHD, CHHDX  Lab Results   Component Value Date/Time    Glucose (POC) 145 (H) 09/06/2022 11:20 AM    Glucose (POC) 131 (H) 09/05/2022 11:55 PM    Glucose (POC) 171 (H) 09/03/2022 12:06 AM    Glucose (POC) 154 (H) 09/02/2022 01:43 PM    Glucose (POC) 113 09/02/2022 06:11 AM     Lab Results   Component Value Date/Time    Color DARK YELLOW 09/04/2022 05:22 PM    Appearance TURBID (A) 09/04/2022 05:22 PM    Specific gravity 1.017 09/04/2022 05:22 PM    pH (UA) 5.5 09/04/2022 05:22 PM    Protein 100 (A) 09/04/2022 05:22 PM    Glucose Negative 09/04/2022 05:22 PM    Ketone Negative 09/04/2022 05:22 PM    Urobilinogen 1.0 09/04/2022 05:22 PM    Nitrites Positive (A) 09/04/2022 05:22 PM    Leukocyte Esterase LARGE (A) 09/04/2022 05:22 PM    Epithelial cells FEW 09/04/2022 05:22 PM    Bacteria 1+ (A) 09/04/2022 05:22 PM    WBC 5-10 09/04/2022 05:22 PM    RBC 5-10 09/04/2022 05:22 PM         Medications Reviewed:     Current Facility-Administered Medications   Medication Dose Route Frequency magnesium sulfate 2 g/50 ml IVPB (premix or compounded)  2 g IntraVENous ONCE    potassium phosphate 30 mmol in 0.9% sodium chloride 500 mL infusion   IntraVENous ONCE    nystatin (MYCOSTATIN) 100,000 unit/mL oral suspension 500,000 Units  500,000 Units Oral QID    sodium bicarbonate (8.4%) 150 mEq in dextrose 5% 1,000 mL infusion   IntraVENous CONTINUOUS    lactulose (CHRONULAC) 10 gram/15 mL solution 30 mL  20 g Oral DAILY    midodrine (PROAMATINE) tablet 15 mg  15 mg Oral Q8H    octreotide (SANDOSTATIN) 500 mcg in 0.9% sodium chloride 500 mL infusion  50 mcg/hr IntraVENous CONTINUOUS    sodium bicarbonate tablet 650 mg  650 mg Oral BID    0.9% sodium chloride infusion 250 mL  250 mL IntraVENous PRN    [Held by provider] albumin human 25% (BUMINATE) solution 25 g  25 g IntraVENous Q6H    [Held by provider] oxyCODONE IR (ROXICODONE) tablet 5 mg  5 mg Oral Q4H PRN    [Held by provider] cefTRIAXone (ROCEPHIN) 2 g in 0.9% sodium chloride 20 mL IV syringe  2 g IntraVENous Q24H    ARIPiprazole (ABILIFY) tablet 2 mg  2 mg Oral DAILY    buPROPion SR (WELLBUTRIN SR) tablet 150 mg  150 mg Oral DAILY    [Held by provider] LORazepam (ATIVAN) tablet 1 mg  1 mg Oral BID PRN    pantoprazole (PROTONIX) 40 mg in 0.9% sodium chloride 10 mL injection  40 mg IntraVENous ACB    sodium chloride (NS) flush 5-40 mL  5-40 mL IntraVENous Q8H    sodium chloride (NS) flush 5-40 mL  5-40 mL IntraVENous PRN    acetaminophen (TYLENOL) tablet 650 mg  650 mg Oral Q6H PRN    Or    acetaminophen (TYLENOL) suppository 650 mg  650 mg Rectal Q6H PRN    polyethylene glycol (MIRALAX) packet 17 g  17 g Oral DAILY PRN    ondansetron (ZOFRAN ODT) tablet 4 mg  4 mg Oral Q8H PRN    Or    ondansetron (ZOFRAN) injection 4 mg  4 mg IntraVENous Q6H PRN    heparin (porcine) injection 5,000 Units  5,000 Units SubCUTAneous Q8H    L.acidophilus-paracasei-S.thermophil-bifidobacter (RISAQUAD) 8 billion cell capsule  1 Capsule Oral DAILY    nicotine (NICODERM CQ) 21 mg/24 hr patch 1 Patch  1 Patch TransDERmal DAILY     ______________________________________________________________________  EXPECTED LENGTH OF STAY: 4d 16h  ACTUAL LENGTH OF STAY:          8                 Yaima Berg MD

## 2022-09-07 NOTE — PROGRESS NOTES
Renal Progress Note    NAME:  Angelica Mukherjee   :   1960   MRN:   [id number]     Date/Time:  2022 12:09 PM  Subjective:       , feels ok, no N/v/SOB. Worry about her pets   seen and examined, has no complaint, not oriented to place or time, had ascitis drain placed by IR, 3 lit drained sofar, 400 ml uo   more sleepy and less interactive, does not answer the questions. Oliguric, no significant change in cr. Possibly HRS. WBC still elevated. Has NGT on TF  9/3 awake, weak, denies pain, NGT is out and will be replaced. Had 600 ml urine/o, BP better, cr stable   more awake and alert, interactive today, wants to eat, , renal function improving, unable to place back the NGT, still significant leukocytosis. Denies abd pain, cough   awake and alert, denies pain, nausea, abd pain. On po diet now, refused TF. Renal function improving, borderline UO     sleeping, UO borderline 450ml, still draining>1.5 lit /day from ascitis, persistent leukocytosis, OFF Abx. Renal function improving slowly.  sitting in chair, feels ok, appetite better.  Renal function improving        Medications reviewed:  Current Facility-Administered Medications   Medication Dose Route Frequency    magnesium sulfate 2 g/50 ml IVPB (premix or compounded)  2 g IntraVENous ONCE    potassium phosphate 30 mmol in 0.9% sodium chloride 500 mL infusion   IntraVENous ONCE    albumin human 25% (BUMINATE) solution 25 g  25 g IntraVENous Q12H    nystatin (MYCOSTATIN) 100,000 unit/mL oral suspension 500,000 Units  500,000 Units Oral QID    [Held by provider] sodium bicarbonate (8.4%) 150 mEq in dextrose 5% 1,000 mL infusion   IntraVENous CONTINUOUS    lactulose (CHRONULAC) 10 gram/15 mL solution 30 mL  20 g Oral DAILY    midodrine (PROAMATINE) tablet 15 mg  15 mg Oral Q8H    octreotide (SANDOSTATIN) 500 mcg in 0.9% sodium chloride 500 mL infusion  50 mcg/hr IntraVENous CONTINUOUS    sodium bicarbonate tablet 650 mg  650 mg Oral BID    0.9% sodium chloride infusion 250 mL  250 mL IntraVENous PRN    [Held by provider] oxyCODONE IR (ROXICODONE) tablet 5 mg  5 mg Oral Q4H PRN    [Held by provider] cefTRIAXone (ROCEPHIN) 2 g in 0.9% sodium chloride 20 mL IV syringe  2 g IntraVENous Q24H    ARIPiprazole (ABILIFY) tablet 2 mg  2 mg Oral DAILY    buPROPion SR (WELLBUTRIN SR) tablet 150 mg  150 mg Oral DAILY    [Held by provider] LORazepam (ATIVAN) tablet 1 mg  1 mg Oral BID PRN    pantoprazole (PROTONIX) 40 mg in 0.9% sodium chloride 10 mL injection  40 mg IntraVENous ACB    sodium chloride (NS) flush 5-40 mL  5-40 mL IntraVENous Q8H    sodium chloride (NS) flush 5-40 mL  5-40 mL IntraVENous PRN    acetaminophen (TYLENOL) tablet 650 mg  650 mg Oral Q6H PRN    Or    acetaminophen (TYLENOL) suppository 650 mg  650 mg Rectal Q6H PRN    polyethylene glycol (MIRALAX) packet 17 g  17 g Oral DAILY PRN    ondansetron (ZOFRAN ODT) tablet 4 mg  4 mg Oral Q8H PRN    Or    ondansetron (ZOFRAN) injection 4 mg  4 mg IntraVENous Q6H PRN    heparin (porcine) injection 5,000 Units  5,000 Units SubCUTAneous Q8H    L.acidophilus-paracasei-S.thermophil-bifidobacter (RISAQUAD) 8 billion cell capsule  1 Capsule Oral DAILY    nicotine (NICODERM CQ) 21 mg/24 hr patch 1 Patch  1 Patch TransDERmal DAILY        Objective:   Vitals:  Visit Vitals  BP (!) 107/55 (BP 1 Location: Right upper arm, BP Patient Position: Sitting)   Pulse 73   Temp 98 °F (36.7 °C)   Resp 18   Ht 5' 3\" (1.6 m)   Wt 49.7 kg (109 lb 9.6 oz)   SpO2 100%   BMI 19.41 kg/m²     Temp (24hrs), Av.2 °F (37.3 °C), Min:98 °F (36.7 °C), Max:99.8 °F (37.7 °C)      O2 Device: None (Room air)    Last 24hr Input/Output:    Intake/Output Summary (Last 24 hours) at 2022 1201  Last data filed at 2022 0959  Gross per 24 hour   Intake 4377.99 ml   Output 1350 ml   Net 3027.99 ml          Physical Exam:  General: Jaundiced, awake and alert, NAD  HEENT:  sclerae icteric,  Neck: Supple  Lungs : Clears to auscultation Bilaterally, Normal respiratory effort  CVS: RRR, S1 S2 normal, No rub, no  LE edema  Abdomen: Soft, Non tender,   Ascitis+, paredes, abdomianl drain+  : paredes  Extremities: No edema  Skin: jaundice  Neurologic: sleeping,     Lab Data Reviewed:    Recent Results (from the past 24 hour(s))   CELL COUNT, BODY FLUID    Collection Time: 09/06/22  5:34 PM   Result Value Ref Range    BODY FLUID TYPE ASCITIC FLUID       FLUID COLOR YELLOW      FLUID APPEARANCE HAZY      FLUID RBC CT. >100 (H) 0 /cu mm    FLUID NUCLEATED CELLS 118 /cu mm    FLD NEUTROPHILS 17 (A) NRRE %    FLD LYMPHS 18 (A) NRRE %    FLD MONO/MACROPHAGES 59 (A) NRRE %    FLUID MESOTHELIAL 6 (A) NRRE %   RENAL FUNCTION PANEL    Collection Time: 09/07/22 12:04 AM   Result Value Ref Range    Sodium 139 136 - 145 mmol/L    Potassium 3.4 (L) 3.5 - 5.1 mmol/L    Chloride 98 97 - 108 mmol/L    CO2 31 21 - 32 mmol/L    Anion gap 10 5 - 15 mmol/L    Glucose 113 (H) 65 - 100 mg/dL    BUN 53 (H) 6 - 20 MG/DL    Creatinine 2.39 (H) 0.55 - 1.02 MG/DL    BUN/Creatinine ratio 22 (H) 12 - 20      GFR est AA 25 (L) >60 ml/min/1.73m2    GFR est non-AA 21 (L) >60 ml/min/1.73m2    Calcium 8.4 (L) 8.5 - 10.1 MG/DL    Phosphorus 1.5 (L) 2.6 - 4.7 MG/DL    Albumin 3.6 3.5 - 5.0 g/dL   CBC WITH AUTOMATED DIFF    Collection Time: 09/07/22 12:04 AM   Result Value Ref Range    WBC 26.2 (H) 3.6 - 11.0 K/uL    RBC 2.53 (L) 3.80 - 5.20 M/uL    HGB 8.7 (L) 11.5 - 16.0 g/dL    HCT 25.1 (L) 35.0 - 47.0 %    MCV 99.2 (H) 80.0 - 99.0 FL    MCH 34.4 (H) 26.0 - 34.0 PG    MCHC 34.7 30.0 - 36.5 g/dL    RDW 21.7 (H) 11.5 - 14.5 %    PLATELET 86 (L) 889 - 400 K/uL    MPV 11.5 8.9 - 12.9 FL    NRBC 0.0 0  WBC    ABSOLUTE NRBC 0.00 0.00 - 0.01 K/uL    NEUTROPHILS 80 (H) 32 - 75 %    LYMPHOCYTES 9 (L) 12 - 49 %    MONOCYTES 9 5 - 13 %    EOSINOPHILS 1 0 - 7 %    BASOPHILS 0 0 - 1 %    IMMATURE GRANULOCYTES 1 (H) 0.0 - 0.5 %    ABS. NEUTROPHILS 20.8 (H) 1.8 - 8.0 K/UL    ABS. LYMPHOCYTES 2.4 0.8 - 3.5 K/UL    ABS. MONOCYTES 2.4 (H) 0.0 - 1.0 K/UL    ABS. EOSINOPHILS 0.3 0.0 - 0.4 K/UL    ABS. BASOPHILS 0.0 0.0 - 0.1 K/UL    ABS. IMM. GRANS. 0.3 (H) 0.00 - 0.04 K/UL    DF SMEAR SCANNED      RBC COMMENTS ANISOCYTOSIS  2+        RBC COMMENTS MACROCYTOSIS  1+       MAGNESIUM    Collection Time: 09/07/22 12:04 AM   Result Value Ref Range    Magnesium 1.5 (L) 1.6 - 2.4 mg/dL         Assessment/Plan:   Principal Problem:    Alcoholic cirrhosis of liver with ascites ([facility] [address] (8/30/2022)    Active Problems:    Severe protein-calorie malnutrition ([facility] (8/31/2022)     Acute kidney injury -cr 0.4 in early August,   -RANDY/possible ATN due to intravascular volume depletion in setting of poor po intake, hypotension /third spacing and possible sepsis/NSAIDs. Hepatorenal in DDx . Low FeNa-low urine output  Possible HRS. Not a candidate liver transplant Per Dr Shawnee Thomson. Not a candidate for RRT. -renal function improving  -c/wMidodrine 15 mg tid  -c/w octreotide-last day today  -c/w  po bicarb , will cut back if bicarb stay high off OVF  -off tube feeding-nutritional support  -strict Is and Os  -avoid nephrotoxins, IV contrast/NSAIDs  -dose meds per GFR  -f/up  renal function  -treat infections, has persistent leukocytosis  -c/w with IV albumin, change to q12hr  -Dc IVf     Hypotension-with h/o HTN on Coreg, due to effective intravascular volume depletion  -on prednisone per med list, but patient denies taking that.  -midodrine     Hyponatremia, due to liver cirrhosis and volume depletion, chronic since early August  -improving with IVF  -Avoid rapid correction  -improved    Hypokalemia/hypophosphatemia, hypomagnesemia   -replete prn    Alcoholic liver cirrhosis-decompensated. -Hypoalbuminemia/ascitis/coagulopathy-nl ammonia  -hepatology following-not a candidate for liver transplant.  Poor prognosis  - on lactulose    Anemia  -high iron stores  -Hb has dropped, might be due to hydration, r/o bleed  -transfuse for Hb<7    Persistent Leukocytosis: off Rocephin-source of infection? -manegaement per primary team    Hypoglycemia, off TF-monitor-changed IVF to D5 with bicarb    Noted DNR now    Poor prognosis  ___________________________________________________    Total time spent with patient:  [] 15   [] 25   [x] 35   []  __ minutes    [] Critical Care Provided    Care Plan discussed with:RN and Dr Raman Alejandre    [x] Patient   [] Family    [] Care Manager   [] Consultant/Specialist :      []   >50% of visit spent in counseling and coordination of care   (Discussed [] CODE status,  [] Care Plan, [] D/C Planning)    ___________________________________________________    Attending Physician: MD Randall Connelly

## 2022-09-07 NOTE — PROGRESS NOTES
0730: Bedside shift change report given to Sergio Hathaway (oncoming nurse) by Kimo Robertson (offgoing nurse). Report included the following information SBAR, Kardex, Intake/Output, MAR, and Recent Results. 1930: Bedside shift change report given to Benito Oquendo (oncoming nurse) by Sergio Hathaway (offgoing nurse). Report included the following information SBAR, Kardex, ED Summary, Procedure Summary, Intake/Output, MAR, and Recent Results.

## 2022-09-07 NOTE — PROGRESS NOTES
Comprehensive Nutrition Assessment    Type and Reason for Visit: Reassess    Nutrition Recommendations/Plan:     Will liberalize diet order to Regular to encourage PO    Continue with Ensure Enlive TID    Will order Ensure Clear BID    Recommend replacing NGT if pt will agree. TF Rec's if placed:               Jevity 1.5 at goal rate of 60 ml/hr + 130 ml h20 flush q4h          Malnutrition Assessment:  Malnutrition Status:  Severe malnutrition (08/31/22 1425)    Context:  Chronic illness     Findings of the 6 clinical characteristics of malnutrition:   Energy Intake:  75% or less est energy requirements for 1 month or longer  Weight Loss:  Unable to assess     Body Fat Loss:  Severe body fat loss, Triceps, Orbital, Buccal region   Muscle Mass Loss:  Severe muscle mass loss, Temples (temporalis), Clavicles (pectoralis &deltoids), Hand (interosseous)  Fluid Accumulation:  Severe, Ascites   Strength:  Not performed        Nutrition Assessment:    66 yo female admitted for ETOH cirrhosis with ascites. Pmhx: HTN, ETOH cirrhosis. Nephrology following for high BUN/Cr. MD consult received for TF rec's. Hepatologist notes severe malnutrition and inability to meet her needs with PO intakes. Spoke with pt at bedside. She is jaundiced and intermittently confused. Severe muscle and fat wasting present. States she usually eats 2x/day at home. Provided a UBW of 177 lbs but could not tell me when she weighed that. Based on her current weight of 112 lbs, this would indicate a 36% loss. Pt with large volume ascites. Noted to have received paracentesis PTA but amount not documented. TF Rec's: Jevity 1.5 at goal rate of 60 ml/hr + 130 ml h20 flush q4h to provide 1320 ml, 1980 kcals (100% kcal needs), 84 gm protein (91% protein needs), 1003 + 780 = 1783 ml water. Currently NPO. Will monitor for diet order and adjust TF as needed (possible change to bolus or nocturnal). Labs reviewed. 9/7:  Follow up. Paracentesis drain placed. TF previously running via NGT until pt pulled the tube out on 9/3 and refused to have it replaced. Hepatologist recommending replacing it with a bridle to prevent her from pulling it out again. Diet was advanced to Easy to Chew and Ensure Enlive ordered TID. Spoke with pt at bedside. She reports not eating well. Student nurse in room who states all she had for breakfast this morning was the Ensure. Pt asking for \"big girl food\" referencing the fact that she is on easy to chew diet. Encouraged increased PO intakes to meet her nutrition needs since she is refusing NGT. Requesting chocolate flavor ensure (receiving vanilla). Discussed additional ONS options, pt agreeable to try Ensure Clear as she likes juice. Weight is down 1.6 kg from assessment 1 week ago.   Labs: K+ 3.4, phos 1.5, Mg 1.5, BUN/Cr elevated (nephrology following)        Nutritionally Significant Medications:  Midodrine, probiotic, lactulose, kphos, kcl, MgSu      Estimated Daily Nutrient Needs:  Energy Requirements Based On: Kcal/kg  Weight Used for Energy Requirements: Current  Energy (kcal/day): 6489-5872 (35-40 kcals/kg)  Weight Used for Protein Requirements: Current  Protein (g/day):  (1.8-2 gm/kg)  Method Used for Fluid Requirements: 1 ml/kcal  Fluid (ml/day): 1800    Nutrition Related Findings:   Edema: ascites  Last BM: 09/06/22, Watery, Loose    Wounds: None      Current Nutrition Therapies:  Diet: Easy to Chew  Supplements: Ensure Enlive    Meal intake: Patient Vitals for the past 168 hrs:   % Diet Eaten   09/07/22 0959 1 - 25%   09/06/22 1800 0%   09/06/22 1009 1 - 25%   09/05/22 1530 1 - 25%   09/05/22 1100 1 - 25%   09/05/22 0800 1 - 25%   09/04/22 1900 1 - 25%   09/04/22 1557 1 - 25%   09/01/22 1600 0%   09/01/22 1200 0%   09/01/22 0800 0%   08/31/22 1600 0%     Supplement intake: Patient Vitals for the past 168 hrs:   Supplement intake %   09/07/22 0959 51 - 75%   09/06/22 1009 0%   09/04/22 1557 0%     Nutrition Support: none      Anthropometric Measures:  Height: 5' 3\" (160 cm)  Ideal Body Weight (IBW): 115 lbs (52 kg)     Current Body Wt:  49.7 kg (109 lb 9.1 oz), 95.3 % IBW.  Bed scale  Current BMI (kg/m2): 19.4        Weight Adjustment: No adjustment                 BMI Category: Normal weight (BMI 22.0-24.9) age over 72    Wt Readings from Last 10 Encounters:   09/07/22 49.7 kg (109 lb 9.6 oz)           Nutrition Diagnosis:   Severe malnutrition related to inadequate protein-energy intake as evidenced by severe muscle loss, severe loss of subcutaneous fat, poor intake prior to admission    Nutrition Interventions:   Food and/or Nutrient Delivery: Modify current diet, Continue oral nutrition supplement  Nutrition Education/Counseling: No recommendations at this time  Coordination of Nutrition Care: Continue to monitor while inpatient       Goals:  Previous Goal Met: No progress toward goal(s)  Goals: other (specify)  Specify Other Goals: PO intakes > 50% meals + ONS over next 5-7 days    Nutrition Monitoring and Evaluation:   Behavioral-Environmental Outcomes: Readiness for change  Food/Nutrient Intake Outcomes: Food and nutrient intake, Supplement intake  Physical Signs/Symptoms Outcomes: Biochemical data, GI status, Weight    Discharge Planning:    Continue current diet, Continue oral nutrition supplement    Haroon Emerson RD  Available via [employer]

## 2022-09-07 NOTE — PROGRESS NOTES
1930 Bedside shift change report given to Joe King (oncoming nurse) by Cata Lott (offgoing nurse). Report included the following information SBAR, Kardex, Intake/Output, MAR, Recent Results, and Cardiac Rhythm NSR .     0000 pt has not voided since paredes removed during day shift. Pt bladder scanned for 200mL. Pt has no urge to void and is in no discomfort at this time. 0200 Pt bladder scanned for 280mL. [facility], NP notified. Orders received to straight cath pt. Entered pt room to straight cath pt and found 250mL void in purewick canister. Pt bladder scanned for 120mL  post-void. Notified [facility], NP. Orders received to not straight cath pt now. 0730 Bedside shift change report given to Cata Lott (oncoming nurse) by Joe King (offgoing nurse). Report included the following information SBAR, Kardex, Intake/Output, MAR, Recent Results, and Cardiac Rhythm NSR . Problem: Falls - Risk of  Goal: *Absence of Falls  Description: Document Asad Leung Fall Risk and appropriate interventions in the flowsheet.   Outcome: Progressing Towards Goal  Note: Fall Risk Interventions:  Mobility Interventions: Assess mobility with egress test, Bed/chair exit alarm, Communicate number of staff needed for ambulation/transfer, OT consult for ADLs, Patient to call before getting OOB, PT Consult for mobility concerns, Strengthening exercises (ROM-active/passive)    Mentation Interventions: Adequate sleep, hydration, pain control, Door open when patient unattended, Bed/chair exit alarm, Evaluate medications/consider consulting pharmacy, Increase mobility, More frequent rounding, Reorient patient, Room close to nurse's station, Toileting rounds, Update white board    Medication Interventions: Bed/chair exit alarm, Evaluate medications/consider consulting pharmacy, Patient to call before getting OOB, Teach patient to arise slowly    Elimination Interventions: Bed/chair exit alarm, Call light in reach, Patient to call for help with toileting needs, Stay With Me (per policy), Toileting schedule/hourly rounds    History of Falls Interventions: Bed/chair exit alarm, Door open when patient unattended, Consult care management for discharge planning, Evaluate medications/consider consulting pharmacy, Room close to nurse's station         Problem: Patient Education: Go to Patient Education Activity  Goal: Patient/Family Education  Outcome: Progressing Towards Goal     Problem: Pressure Injury - Risk of  Goal: *Prevention of pressure injury  Description: Document Chandana Scale and appropriate interventions in the flowsheet. Outcome: Progressing Towards Goal  Note: Pressure Injury Interventions:  Sensory Interventions: Assess changes in LOC, Assess need for specialty bed, Avoid rigorous massage over bony prominences, Check visual cues for pain, Discuss PT/OT consult with provider, Float heels, Keep linens dry and wrinkle-free, Maintain/enhance activity level, Pressure redistribution bed/mattress (bed type), Minimize linen layers, Turn and reposition approx. every two hours (pillows and wedges if needed)    Moisture Interventions: Absorbent underpads, Apply protective barrier, creams and emollients, Assess need for specialty bed, Check for incontinence Q2 hours and as needed, Limit adult briefs, Minimize layers    Activity Interventions: Assess need for specialty bed, Increase time out of bed, Pressure redistribution bed/mattress(bed type), PT/OT evaluation    Mobility Interventions: Assess need for specialty bed, Float heels, Pressure redistribution bed/mattress (bed type), PT/OT evaluation, Turn and reposition approx.  every two hours(pillow and wedges)    Nutrition Interventions: Document food/fluid/supplement intake    Friction and Shear Interventions: Apply protective barrier, creams and emollients, Feet elevated on foot rest, Foam dressings/transparent film/skin sealants, Lift sheet, Lift team/patient mobility team, Minimize layers                Problem: Patient Education: Go to Patient Education Activity  Goal: Patient/Family Education  Outcome: Progressing Towards Goal     Problem: Hypotension  Goal: *Fluid volume balance  Outcome: Progressing Towards Goal  Goal: *Labs within defined limits  Outcome: Progressing Towards Goal     Problem: Patient Education: Go to Patient Education Activity  Goal: Patient/Family Education  Outcome: Progressing Towards Goal     Problem: General Medical Care Plan  Goal: *Labs within defined limits  Outcome: Progressing Towards Goal  Goal: *Optimal pain control at patient's stated goal  Outcome: Progressing Towards Goal  Goal: *Skin integrity maintained  Outcome: Progressing Towards Goal  Goal: *Fluid volume balance  Outcome: Progressing Towards Goal  Goal: *Anxiety reduced or absent  Outcome: Progressing Towards Goal  Goal: *Progressive mobility and function (eg: ADL's)  Outcome: Progressing Towards Goal     Problem: Patient Education: Go to Patient Education Activity  Goal: Patient/Family Education  Outcome: Progressing Towards Goal     Problem:  Body Temperature -  Risk of, Imbalanced  Goal: *Absence of cold stress or hypothermia signs and symptoms  Outcome: Progressing Towards Goal     Problem: Patient Education: Go to Patient Education Activity  Goal: Patient/Family Education  Outcome: Progressing Towards Goal

## 2022-09-08 LAB
ALBUMIN SERPL-MCNC: 3.5 G/DL (ref 3.5–5)
ANION GAP SERPL CALC-SCNC: 11 MMOL/L (ref 5–15)
BUN SERPL-MCNC: 57 MG/DL (ref 6–20)
BUN/CREAT SERPL: 28 (ref 12–20)
CALCIUM SERPL-MCNC: 8.7 MG/DL (ref 8.5–10.1)
CHLORIDE SERPL-SCNC: 98 MMOL/L (ref 97–108)
CO2 SERPL-SCNC: 28 MMOL/L (ref 21–32)
COMMENT, HOLDF: NORMAL
CREAT SERPL-MCNC: 2.07 MG/DL (ref 0.55–1.02)
GLUCOSE SERPL-MCNC: 92 MG/DL (ref 65–100)
MAGNESIUM SERPL-MCNC: 1.5 MG/DL (ref 1.6–2.4)
PHOSPHATE SERPL-MCNC: 3.6 MG/DL (ref 2.6–4.7)
POTASSIUM SERPL-SCNC: 3.7 MMOL/L (ref 3.5–5.1)
SAMPLES BEING HELD,HOLD: NORMAL
SODIUM SERPL-SCNC: 137 MMOL/L (ref 136–145)

## 2022-09-08 PROCEDURE — 74011636637 HC RX REV CODE- 636/637: Performed by: INTERNAL MEDICINE

## 2022-09-08 PROCEDURE — 36415 COLL VENOUS BLD VENIPUNCTURE: CPT

## 2022-09-08 PROCEDURE — 97530 THERAPEUTIC ACTIVITIES: CPT

## 2022-09-08 PROCEDURE — 99223 1ST HOSP IP/OBS HIGH 75: CPT | Performed by: INTERNAL MEDICINE

## 2022-09-08 PROCEDURE — 74011250637 HC RX REV CODE- 250/637: Performed by: HOSPITALIST

## 2022-09-08 PROCEDURE — 99233 SBSQ HOSP IP/OBS HIGH 50: CPT | Performed by: INTERNAL MEDICINE

## 2022-09-08 PROCEDURE — 65660000001 HC RM ICU INTERMED STEPDOWN

## 2022-09-08 PROCEDURE — C9113 INJ PANTOPRAZOLE SODIUM, VIA: HCPCS | Performed by: INTERNAL MEDICINE

## 2022-09-08 PROCEDURE — 74011250637 HC RX REV CODE- 250/637: Performed by: INTERNAL MEDICINE

## 2022-09-08 PROCEDURE — 97116 GAIT TRAINING THERAPY: CPT

## 2022-09-08 PROCEDURE — 74011000250 HC RX REV CODE- 250: Performed by: INTERNAL MEDICINE

## 2022-09-08 PROCEDURE — P9047 ALBUMIN (HUMAN), 25%, 50ML: HCPCS | Performed by: INTERNAL MEDICINE

## 2022-09-08 PROCEDURE — 83735 ASSAY OF MAGNESIUM: CPT

## 2022-09-08 PROCEDURE — 74011250636 HC RX REV CODE- 250/636: Performed by: INTERNAL MEDICINE

## 2022-09-08 PROCEDURE — 77030038269 HC DRN EXT URIN PURWCK BARD -A

## 2022-09-08 PROCEDURE — 97535 SELF CARE MNGMENT TRAINING: CPT

## 2022-09-08 PROCEDURE — 80069 RENAL FUNCTION PANEL: CPT

## 2022-09-08 RX ADMIN — HEPARIN SODIUM 5000 UNITS: 5000 INJECTION INTRAVENOUS; SUBCUTANEOUS at 17:27

## 2022-09-08 RX ADMIN — OCTREOTIDE ACETATE 50 MCG/HR: 500 INJECTION, SOLUTION INTRAVENOUS; SUBCUTANEOUS at 22:17

## 2022-09-08 RX ADMIN — SODIUM CHLORIDE, PRESERVATIVE FREE 10 ML: 5 INJECTION INTRAVENOUS at 22:00

## 2022-09-08 RX ADMIN — POTASSIUM BICARBONATE 20 MEQ: 782 TABLET, EFFERVESCENT ORAL at 10:48

## 2022-09-08 RX ADMIN — ARIPIPRAZOLE 2 MG: 2 TABLET ORAL at 10:50

## 2022-09-08 RX ADMIN — ALBUMIN (HUMAN) 25 G: 0.25 INJECTION, SOLUTION INTRAVENOUS at 10:47

## 2022-09-08 RX ADMIN — NYSTATIN 500000 UNITS: 100000 SUSPENSION ORAL at 10:51

## 2022-09-08 RX ADMIN — Medication 1 CAPSULE: at 10:47

## 2022-09-08 RX ADMIN — NYSTATIN 500000 UNITS: 100000 SUSPENSION ORAL at 17:27

## 2022-09-08 RX ADMIN — HEPARIN SODIUM 5000 UNITS: 5000 INJECTION INTRAVENOUS; SUBCUTANEOUS at 10:49

## 2022-09-08 RX ADMIN — MIDODRINE HYDROCHLORIDE 15 MG: 5 TABLET ORAL at 22:12

## 2022-09-08 RX ADMIN — NYSTATIN 500000 UNITS: 100000 SUSPENSION ORAL at 22:11

## 2022-09-08 RX ADMIN — SODIUM BICARBONATE 650 MG: 650 TABLET ORAL at 10:51

## 2022-09-08 RX ADMIN — SODIUM CHLORIDE, PRESERVATIVE FREE 10 ML: 5 INJECTION INTRAVENOUS at 07:39

## 2022-09-08 RX ADMIN — BUPROPION HYDROCHLORIDE 150 MG: 150 TABLET, EXTENDED RELEASE ORAL at 10:49

## 2022-09-08 RX ADMIN — SODIUM CHLORIDE, PRESERVATIVE FREE 10 ML: 5 INJECTION INTRAVENOUS at 17:27

## 2022-09-08 RX ADMIN — SODIUM BICARBONATE 650 MG: 650 TABLET ORAL at 17:27

## 2022-09-08 RX ADMIN — NYSTATIN 500000 UNITS: 100000 SUSPENSION ORAL at 14:55

## 2022-09-08 RX ADMIN — OCTREOTIDE ACETATE 50 MCG/HR: 500 INJECTION, SOLUTION INTRAVENOUS; SUBCUTANEOUS at 12:33

## 2022-09-08 RX ADMIN — MIDODRINE HYDROCHLORIDE 15 MG: 5 TABLET ORAL at 14:55

## 2022-09-08 RX ADMIN — ALBUMIN (HUMAN) 25 G: 0.25 INJECTION, SOLUTION INTRAVENOUS at 22:17

## 2022-09-08 RX ADMIN — LACTULOSE 30 ML: 20 SOLUTION ORAL at 10:49

## 2022-09-08 RX ADMIN — MIDODRINE HYDROCHLORIDE 15 MG: 5 TABLET ORAL at 07:39

## 2022-09-08 RX ADMIN — PANTOPRAZOLE SODIUM 40 MG: 40 INJECTION, POWDER, FOR SOLUTION INTRAVENOUS at 08:29

## 2022-09-08 NOTE — PROGRESS NOTES
FUNCTIONAL STATUS PRIOR TO ADMISSION: At time of evaluation pt is a questionable historian. Patient was modified independent using a walker and single point cane for functional mobility. HOME SUPPORT: The patient lived alone with her friend to provide assistance as needed. Occupational Therapy Goals  Initiated 9/1/2022: goals downgraded 9/8/22  1. Patient will perform standing grooming routine with supervision/set-up within 7 day(s). (Sitting EOB 9/8/22)  2. Patient will perform seated upper and lower body bathing with supervision/set-up within 7 day(s). (Min A 9/8/22)  3. Patient will perform upper and lower body dressing with supervision/set-up within 7 day(s). (Min A 9/8/22)  4. Patient will perform toilet transfers with supervision/set-up within 7 day(s). (Min A 9/8/22)  5. Patient will perform all aspects of toileting with supervision/set-up within 7 day(s). (Min A 9/8/22)  6. Patient will participate in upper extremity therapeutic exercise/activities with supervision/set-up for 5 minutes within 7 day(s). (Continue 9/8/22)      OCCUPATIONAL THERAPY TREATMENT/WEEKLY RE-ASSESSMENT  Patient: Verónica Rogers (2 y.o. female)  Date: 9/8/2022  Diagnosis: Alcoholic cirrhosis of liver with ascites (HCC) [J83.41] Alcoholic cirrhosis of liver with ascites [facility])      Precautions: Fall  Chart, occupational therapy assessment, plan of care, and goals were reviewed. ASSESSMENT  Patient continues with skilled OT services and is progressing towards goals. Upon arrival, pt received with LE off of bed and reported need to go to the bathroom. She was able to follow 1 step commands more consistently this date, occasionally requiring more time. Pt required mod A for supine to sit. She had good sitting balance at EOB. She required min A x2 for sit to stand with verbal and tactile cues for hand placement. Pt completed stand pivot transfer to [facility] with min A x2.  Pt dependent for bowel and bladder hygiene in standing with RW for support. While standing, pt noted to have strong posterior lean and required max A to maintain static standing balance. Pt set up for feeding in chair with all needs met. Current Level of Function Impacting Discharge (ADLs): Dependence for bowel and bladder hygiene, min-mod A for transfers    Other factors to consider for discharge: Pt lives alone         PLAN :  Goals have been updated based on progression since last assessment. Patient continues to benefit from skilled intervention to address the above impairments. Continue to follow patient 5 times a week to address goals. Recommend with staff: OOB to chair for all meals, Ax2 and RW    Recommend next OT session: ADLs per POC    Recommendation for discharge: (in order for the patient to meet his/her long term goals)  To be determined: Continue working towards tolerating IPR    This discharge recommendation:  A follow-up discussion with the attending provider and/or case management is planned    IF patient discharges home will need the following DME: To be determined       SUBJECTIVE:   Patient stated I really have to use the bathroom.     OBJECTIVE DATA SUMMARY:   Cognitive/Behavioral Status:  Neurologic State: Alert                   Functional Mobility and Transfers for ADLs:  Bed Mobility:  Supine to Sit: Moderate assistance  Sit to Supine: Moderate assistance;Assist x1  Scooting: Minimum assistance;Assist x1    Transfers:  Sit to Stand: Contact guard assistance;Assist x1;Additional time; Other (comment) (cues for hand placement; required multiple attempts)  Functional Transfers  Toilet Transfer : Minimum assistance;Assist x2  Bed to Chair: Contact guard assistance;Assist x1    Balance:  Sitting: Intact; Without support  Sitting - Static: Good (unsupported)  Sitting - Dynamic: Good (unsupported)  Standing: Impaired; With support  Standing - Static: Fair;Constant support  Standing - Dynamic : Fair;Constant support    ADL Intervention:  Feeding  Feeding Assistance: Set-up  Utensil Management: Independent  Food to Mouth: Independent                                  Toileting  Bladder Hygiene: Total assistance (dependent)  Bowel Hygiene: Total assistance (dependent)           Pain:  No pain    Activity Tolerance:   Good, SpO2 stable on RA, and requires rest breaks    After treatment patient left in no apparent distress:   Sitting in chair    COMMUNICATION/COLLABORATION:   The patients plan of care was discussed with: Registered nurse. Jerie Riedel, OTS  Time Calculation: 32 mins     Regarding student involvement in patient care:  A student participated in this treatment session. Per CMS Medicare statements and AOTA guidelines I certify that the following was true:  1. I was present and directly observed the entire session. 2. I made all skilled judgments and clinical decisions regarding care. 3. I am the practitioner responsible for assessment, treatment, and documentation.

## 2022-09-08 NOTE — PROGRESS NOTES
Renal Progress Note    NAME:  Natasha Ortiz   :   1960   MRN:   [id number]     Date/Time:  2022 12:09 PM  Subjective:       , feels ok, no N/v/SOB. Worry about her pets   seen and examined, has no complaint, not oriented to place or time, had ascitis drain placed by IR, 3 lit drained sofar, 400 ml uo   more sleepy and less interactive, does not answer the questions. Oliguric, no significant change in cr. Possibly HRS. WBC still elevated. Has NGT on TF  9/3 awake, weak, denies pain, NGT is out and will be replaced. Had 600 ml urine/o, BP better, cr stable   more awake and alert, interactive today, wants to eat, , renal function improving, unable to place back the NGT, still significant leukocytosis. Denies abd pain, cough   awake and alert, denies pain, nausea, abd pain. On po diet now, refused TF. Renal function improving, borderline UO     sleeping, UO borderline 450ml, still draining>1.5 lit /day from ascitis, persistent leukocytosis, OFF Abx. Renal function improving slowly.  sitting in chair, feels ok, appetite better. Renal function improving   awake, denies complaints, low po intake,  ml, paredes has been removed.   Renal function improving        Medications reviewed:  Current Facility-Administered Medications   Medication Dose Route Frequency    albumin human 25% (BUMINATE) solution 25 g  25 g IntraVENous Q12H    nystatin (MYCOSTATIN) 100,000 unit/mL oral suspension 500,000 Units  500,000 Units Oral QID    [Held by provider] sodium bicarbonate (8.4%) 150 mEq in dextrose 5% 1,000 mL infusion   IntraVENous CONTINUOUS    lactulose (CHRONULAC) 10 gram/15 mL solution 30 mL  20 g Oral DAILY    midodrine (PROAMATINE) tablet 15 mg  15 mg Oral Q8H    octreotide (SANDOSTATIN) 500 mcg in 0.9% sodium chloride 500 mL infusion  50 mcg/hr IntraVENous CONTINUOUS    sodium bicarbonate tablet 650 mg  650 mg Oral BID    0.9% sodium chloride infusion 250 mL  250 mL IntraVENous PRN    [Held by provider] oxyCODONE IR (ROXICODONE) tablet 5 mg  5 mg Oral Q4H PRN    [Held by provider] cefTRIAXone (ROCEPHIN) 2 g in 0.9% sodium chloride 20 mL IV syringe  2 g IntraVENous Q24H    ARIPiprazole (ABILIFY) tablet 2 mg  2 mg Oral DAILY    buPROPion SR (WELLBUTRIN SR) tablet 150 mg  150 mg Oral DAILY    [Held by provider] LORazepam (ATIVAN) tablet 1 mg  1 mg Oral BID PRN    pantoprazole (PROTONIX) 40 mg in 0.9% sodium chloride 10 mL injection  40 mg IntraVENous ACB    sodium chloride (NS) flush 5-40 mL  5-40 mL IntraVENous Q8H    sodium chloride (NS) flush 5-40 mL  5-40 mL IntraVENous PRN    acetaminophen (TYLENOL) tablet 650 mg  650 mg Oral Q6H PRN    Or    acetaminophen (TYLENOL) suppository 650 mg  650 mg Rectal Q6H PRN    polyethylene glycol (MIRALAX) packet 17 g  17 g Oral DAILY PRN    ondansetron (ZOFRAN ODT) tablet 4 mg  4 mg Oral Q8H PRN    Or    ondansetron (ZOFRAN) injection 4 mg  4 mg IntraVENous Q6H PRN    heparin (porcine) injection 5,000 Units  5,000 Units SubCUTAneous Q8H    L.acidophilus-paracasei-S.thermophil-bifidobacter (RISAQUAD) 8 billion cell capsule  1 Capsule Oral DAILY    nicotine (NICODERM CQ) 21 mg/24 hr patch 1 Patch  1 Patch TransDERmal DAILY        Objective:   Vitals:  Visit Vitals  BP (!) 92/40   Pulse 73   Temp 99 °F (37.2 °C)   Resp 19   Ht 5' 3\" (1.6 m)   Wt 50.4 kg (111 lb 1.8 oz)   SpO2 96%   BMI 19.68 kg/m²     Temp (24hrs), Av.8 °F (37.1 °C), Min:98 °F (36.7 °C), Max:99 °F (37.2 °C)      O2 Device: None (Room air)    Last 24hr Input/Output:    Intake/Output Summary (Last 24 hours) at 2022 0946  Last data filed at 2022 0402  Gross per 24 hour   Intake 2369.67 ml   Output 1425 ml   Net 944.67 ml          Physical Exam:  General: Jaundiced, awake ,, NAD, confuse  HEENT:  sclerae icteric,  Neck: Supple  Lungs : Clears to auscultation Bilaterally, Normal respiratory effort  CVS: RRR, S1 S2 normal, No rub, no  LE edema  Abdomen: Soft, Non tender,   Ascitis+, paredes, abdomianl drain+  : paredes removed  Extremities: No edema  Skin: jaundice  Neurologic: awake, moves all 4 ext    Lab Data Reviewed:    Recent Results (from the past 24 hour(s))   RENAL FUNCTION PANEL    Collection Time: 09/08/22  6:03 AM   Result Value Ref Range    Sodium 137 136 - 145 mmol/L    Potassium 3.7 3.5 - 5.1 mmol/L    Chloride 98 97 - 108 mmol/L    CO2 28 21 - 32 mmol/L    Anion gap 11 5 - 15 mmol/L    Glucose 92 65 - 100 mg/dL    BUN 57 (H) 6 - 20 MG/DL    Creatinine 2.07 (H) 0.55 - 1.02 MG/DL    BUN/Creatinine ratio 28 (H) 12 - 20      GFR est AA 30 (L) >60 ml/min/1.73m2    GFR est non-AA 24 (L) >60 ml/min/1.73m2    Calcium 8.7 8.5 - 10.1 MG/DL    Phosphorus 3.6 2.6 - 4.7 MG/DL    Albumin 3.5 3.5 - 5.0 g/dL   SAMPLES BEING HELD    Collection Time: 09/08/22  6:03 AM   Result Value Ref Range    SAMPLES BEING HELD 1LAV     COMMENT        Add-on orders for these samples will be processed based on acceptable specimen integrity and analyte stability, which may vary by analyte. Assessment/Plan:   Principal Problem:    Alcoholic cirrhosis of liver with ascites ([facility] (8/30/2022)    Active Problems:    Severe protein-calorie malnutrition ([facility] [address] (8/31/2022)     Acute kidney injury -cr 0.4 in early August,   -RANDY/possible ATN due to intravascular volume depletion in setting of poor po intake, hypotension /third spacing and possible sepsis/NSAIDs. Hepatorenal in DDx . Low FeNa-low urine output  Possible HRS. Not a candidate liver transplant Per Dr Sheron Portillo. Not a candidate for RRT.    -renal function improving  -c/w  po bicarb   -off tube feeding-nutritional support  -strict Is and Os  -avoid nephrotoxins, IV contrast/NSAIDs  -dose meds per GFR  -f/up  renal function  - has persistent leukocytosis and worsening, ID consulted  -c/w with IV albumin, change to q12hr, octreotide and midodrine       Hypotension-with h/o HTN on Coreg, due to effective intravascular volume depletion  -on prednisone per med list, but patient denies taking that.  -midodrine     Hyponatremia, due to liver cirrhosis and volume depletion, chronic since early August  -improving with IVF  -Avoid rapid correction  -improved    Hypokalemia/hypophosphatemia, hypomagnesemia   -replete prn  -check Mg    Alcoholic liver cirrhosis-decompensated. -Hypoalbuminemia/ascitis/coagulopathy-nl ammonia  -hepatology following-not a candidate for liver transplant.  Poor prognosis  - on lactulose    Anemia  -high iron stores  -Hb has dropped, might be due to hydration, r/o bleed  -transfuse for Hb<7    Persistent Leukocytosis: off Rocephin-source of infection? -manegaement per primary team    Hypoglycemia, resolved    DNR    Poor prognosis  ___________________________________________________    Total time spent with patient:  [] 15   [] 25   [x] 35   []  __ minutes    [] Critical Care Provided    Care Plan discussed with:RN and Dr Kirk Licona    [x] Patient   [] Family    [] Care Manager   [] Consultant/Specialist :      []   >50% of visit spent in counseling and coordination of care   (Discussed [] CODE status,  [] Care Plan, [] D/C Planning)    ___________________________________________________    Attending Physician: MD Randall Prasad

## 2022-09-08 NOTE — PROGRESS NOTES
[facility] [place] [facility]\"", MD, FACP, Ayesha Rosashabbir Fernandez, [place]      EDITH Ro, [facility]     Argentina Sparks, [facility]   ERIC Alcocer, [facility]       Eleonora Tubbs De Srinivas[address]    at [address], [facility] Remington Watson  22.    [phone]    FAX: [address] [place] [address], [address]    [phone]    FAX: [phone]       HEPATOLOGY PROGRESS NOTE  The patient is a 65 y.o.  female with a multi-decade history of consuming 1 bottle of wine per night. She was told by per medical provider in the past to cut back on alcohol use. She developed jaundice and scleral icterus 12 days ago. She has developed weakness and is unable to walk. She lives alone and has no family in the area. This is her first ever hospitalization for alcohol related illness. In the ED Laboratory studies were significant for:    WBC 29.9, HB 9.8 gms, , Sna 129, Scr 4.3 mg, AST 91, ALT 35, , TBILI 29.7 mg, INR 1.8, Sammonia 11,     Imaging of the liver with CT scan demonstrated cirrhosis with a large volume of ascites. I spoke with sister who is in [place] and plans to visit next week. 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 She is too muscle wasted and has RANDY  Paracentesis catheter placed. Drainage down to about 500 cc per day. Scr is coming down from 4 to 2.3 mg.    U/O remains good     Feeding tube was placed  and she was getting tube feedings.   She removed the feeding tube and said she can eat on her own. She is more alert today and was eating some dinner this evening. Getting PT and is up in chair every day. Hepatology Plan:  I have discussed the role of tube feedings with her gain. She does not want feeding tube. She understands the importance of nutrition     All ascites has resolved and now that SCr coming down and marking urine can remove paracentesis catheter. Position her with puncture side facing up for 12 hours so site closes and does not leak  Needs repeat labs so we can see if INR and TBILI coming improving. ASSESSMENT AND PLAN:  Cirrhosis  The diagnosis of cirrhosis is based upon imaging, laboratory studies, complications of cirrhosis. Cirrhosis is secondary to alcohol. The CTP is 10. Child class C. The MELD score is 39. Based upon the MELD and CTP scores the patient has a mortality of about 50-70% within the next 90 days   She is not a candidate for LT at this time because of severe malnutrition and advanced muscle wasting. Alcohol liver disease  Suspect the patient has alcohol induced liver disease based upon a history of consuming significant alcohol on a daily basis for many years, pattern of AST>ALT,   Serologies for other causes of chronic liver disease need to be obtained at some point    Ascites   Ascites developed for the first time in 8/2022. Paracentesis catheter placed and >5L ascites drained so far. Continue IV albumin at usual dose    Acute kidney injury  The patient has developed acute kidney injury with SCr 4 mg on admission. After several days of IV albumin and midodrine SCr started to come down. Scr now at 2.3 mg  This is not HRS. Continue IV albumin and midodrine    Screening for Esophageal varices   The patient has not had an EGD to screen for varices. Will perform EGD at some point during the hospitalization. Not bleeding. Not top priority.   Risk of sedation given severe malnutrition and muscle wasting is considerable. Hepatic encephalopathy   Overt HE has not developed to date. There is no reason for treatment with lactulose of xifaxan  There is no reason to give her diarrhea. There is no need to restrict dietary protein at this time. Coagulopathy  This is secondary to cirrhosis, malnutrition,   Will treat with 3 doses of Vitamin K over 3 days. There is no evidence of bleeding. No need for FFP at this time. Malnutrition/muscle wasting  The patient has severe protein-calorie malnutrition with severe muscle wasting of the upper extremities, lower extremities, facial muscles  Malnutrition and muscle wasting is due to Alcohol abuse and poor caloric intake, cirrhosis and poor caloric utilization, refractory ascites and inability to eat sufficient calories,     The patient needs tube feedings. It is impossible for her to get any significant amount of calories by eating   She pulled out feeding tube and refuses to have this replaced. She clearly understands importance of nutritional support      PHYSICAL EXAMINATION:  VS: per nursing note  General:  Ill appearing  Eyes:  Sclera deeply icteric  ENT:  No oral lesions. Thyroid normal.  Nodes:  No adenopathy. Skin:  Numerous spider angiomata. Jaundice. Respiratory:  Lungs clear to auscultation. Cardiovascular:  Regular heart rate. Abdomen:  Distended with obvious ascites. Extremities:  No lower extremity edema. Severe muscle wasting. Neurologic:  Alert and oriented. Cranial nerves grossly intact. No asterixis.     LABORATORY:   Latest Reference Range & Units 9/5/22 05:01 9/6/22 09:15 9/7/22 00:04   WBC 3.6 - 11.0 K/uL 22.2 (H) 22.8 (H) 26.2 (H)   HGB 11.5 - 16.0 g/dL 10.1 (L) 8.7 (L) 8.7 (L)   PLATELET 911 - 207 K/uL 106 (L) 89 (L) 86 (L)   Sodium 136 - 145 mmol/L 138 140 139   Potassium 3.5 - 5.1 mmol/L 3.5 2.6 (LL) 3.4 (L)   Chloride 97 - 108 mmol/L 108 102 98   CO2 21 - 32 mmol/L 18 (L) 29 31   Glucose 65 - 100 mg/dL 127 (H) 132 (H) 113 (H)   BUN 6 - 20 MG/DL 62 (H) 54 (H) 53 (H)   Creatinine 0.55 - 1.02 MG/DL 3.16 (H) 2.51 (H) 2.39 (H)   Bilirubin, total 0.2 - 1.0 MG/DL 19.3 (H)     Albumin 3.5 - 5.0 g/dL 4.2  4.1  3.6   ALT 12 - 78 U/L 14     AST 15 - 37 U/L 45 (H)     Alk.  phosphatase 45 - 117 U/L 100     (LL): Data is critically low  (H): Data is abnormally high  (L): Data is abnormally low      Edith Peabody, MD  [facility] [address], [address] [place] [address] 22.  [phone]  [address] [place] [address]

## 2022-09-08 NOTE — PROGRESS NOTES
0730: Bedside shift change report given to Paige Juarez (oncoming nurse) by June Sage (offgoing nurse). Report included the following information SBAR, Kardex, Intake/Output, MAR, and Recent Results.

## 2022-09-08 NOTE — PROGRESS NOTES
Problem: Mobility Impaired (Adult and Pediatric)  Goal: *Acute Goals and Plan of Care (Insert Text)  Description: FUNCTIONAL STATUS PRIOR TO ADMISSION: Patient was modified independent using a rollator for functional mobility per patient, however patient is questionable historian. Patient notably with gross muscle wasting in LEs and UEs. HOME SUPPORT PRIOR TO ADMISSION: The patient lived alone with no local support. Physical Therapy Goals  Updated 9/8/2022  1. Patient will move from supine to sit and sit to supine  in bed with modified independence within 7 day(s). 2.  Patient will transfer from bed to chair and chair to bed with modified independence using the least restrictive device within 7 day(s). 3.  Patient will perform sit to stand with modified independence within 7 day(s). 4.  Patient will ambulate with modified independence for 30 feet with the least restrictive device within 7 day(s). 5.  Patient will ascend/descend 3 stairs with bilateral handrail(s) with modified independence within 7 day(s). Physical Therapy Goals  Initiated 9/1/2022  1. Patient will move from supine to sit and sit to supine  in bed with modified independence within 7 day(s). 2.  Patient will transfer from bed to chair and chair to bed with modified independence using the least restrictive device within 7 day(s). 3.  Patient will perform sit to stand with modified independence within 7 day(s). 4.  Patient will ambulate with modified independence for 50 feet with the least restrictive device within 7 day(s). 5.  Patient will ascend/descend 3 stairs with bilateral handrail(s) with modified independence within 7 day(s).     Outcome: Progressing Towards Goal   PHYSICAL THERAPY TREATMENT: WEEKLY REASSESSMENT  Patient: Ham Gimenez (17 y.o. female)  Date: 9/8/2022  Primary Diagnosis: Alcoholic cirrhosis of liver with ascites ([facility] [address] [K70.31]       Precautions:   Fall      ASSESSMENT  Patient continues with skilled PT services and is progressing towards goals. Patient making slow but steady progress towards increasing activity tolerance, strength, and gait independence. Patient continues to demonstrate delayed command process at times requiring additional cues, but follows all commands. Patient completes tranfers with CGA with multiple attempts needed for lift off, and is able to ambulate up to 8 feet with min A x 1 and RW. Patient requires manual sequencing of RW by therapist due to decreased strength and motor planning. Patient limited by frequent bowel incontinence during mobility, with increased fatigue from long bouts of standing or rolling for cleaning. Patient continues to demonstrate progress and will require SNF at discharge for continued rehab. Patient's progression toward goals since last assessment: continue all goals - progressing towards each    Current Level of Function Impacting Discharge (mobility/balance): ambulates 8 feet with min A x 1 and RW    Functional Outcome Measure: The patient scored 12/28 on the Tinetti outcome measure which is indicative of high risk for falls. Other factors to consider for discharge: bowel incontinence, malnutrition          PLAN :  Goals have been updated based on progression since last assessment. Patient continues to benefit from skilled intervention to address the above impairments. Recommendations and Planned Interventions: bed mobility training, transfer training, gait training, therapeutic exercises, neuromuscular re-education, patient and family training/education, and therapeutic activities      Frequency/Duration: Patient will be followed by physical therapy:  5 times a week to address goals.     Recommendation for discharge: (in order for the patient to meet his/her long term goals)  Therapy up to 5 days/week in SNF setting    This discharge recommendation:  A follow-up discussion with the attending provider and/or case management is planned    IF patient discharges home will need the following DME: rolling walker         SUBJECTIVE:   Patient stated Severiano Grieves probably need to use that.  referring to [facility]    OBJECTIVE DATA SUMMARY:   HISTORY:    Past Medical History:   Diagnosis Date    Liver disease      Past Surgical History:   Procedure Laterality Date    HX ORTHOPAEDIC Right     hip replacement       Personal factors and/or comorbidities impacting plan of care: alcoholic cirrhosis     Home Situation  Home Environment: Apartment  # Steps to Enter: 3  Rails to Enter: Yes  Hand Rails : Bilateral  One/Two Story Residence: One story  Living Alone: Yes  Support Systems: Other Family Member(s), Friend/Neighbor  Patient Expects to be Discharged to[de-identified] [facility]  Current DME Used/Available at Home: [address], [place], straight, [place], rolling, [place], rollator, Shower chair (patient states she uses rollator within home, [facility] when out in community)  Tub or Shower Type: Tub/Shower combination    EXAMINATION/PRESENTATION/DECISION MAKING:   Critical Behavior:  Neurologic State: Alert  Orientation Level: Oriented X4  Cognition: Poor safety awareness, Follows commands  Safety/Judgement: Decreased insight into deficits, Fall prevention  Hearing: Auditory  Auditory Impairment: None  Skin:  intact  Edema: none noted  Range Of Motion:  AROM: Generally decreased, functional           PROM: Within functional limits           Strength:    Strength: Generally decreased, functional                    Tone & Sensation:   Tone: Normal              Sensation: Intact               Coordination:  Coordination: Generally decreased, functional  Vision:      Functional Mobility:  Bed Mobility:     Supine to Sit: Moderate assistance  Sit to Supine: Moderate assistance;Assist x1  Scooting: Minimum assistance;Assist x1  Transfers:  Sit to Stand: Contact guard assistance;Assist x1;Additional time; Other (comment) (cues for hand placement; required multiple attempts)  Stand to Sit: Contact guard assistance;Assist x1 Bed to Chair: Contact guard assistance;Assist x1              Balance:   Sitting: Intact; Without support  Sitting - Static: Good (unsupported)  Sitting - Dynamic: Good (unsupported)  Standing: Impaired; With support  Standing - Static: Fair;Constant support  Standing - Dynamic : Fair;Constant support  Ambulation/Gait Training:  Distance (ft): 8 Feet (ft)  Assistive Device: Walker, rolling;Gait belt  Ambulation - Level of Assistance: Minimal assistance;Assist x1        Gait Abnormalities: Decreased step clearance;Shuffling gait;Trunk sway increased        Base of Support: Widened     Speed/Deana: Slow;Shuffled  Step Length: Right shortened;Left shortened                      Functional Measure:  Tinetti test:    Sitting Balance: 1  Arises: 1  Attempts to Rise: 1  Immediate Standing Balance: 0  Standing Balance: 1  Nudged: 0  Eyes Closed: 0  Turn 360 Degrees - Continuous/Discontinuous: 0  Turn 360 Degrees - Steady/Unsteady: 0  Sitting Down: 1  Balance Score: 5 Balance total score  Indication of Gait: 1  R Step Length/Height: 1  L Step Length/Height: 1  R Foot Clearance: 1  L Foot Clearance: 1  Step Symmetry: 1  Step Continuity: 0  Path: 1  Trunk: 0  Walking Time: 0  Gait Score: 7 Gait total score  Total Score: 12/28 Overall total score         Tinetti Tool Score Risk of Falls  <19 = High Fall Risk  19-24 = Moderate Fall Risk  25-28 = Low Fall Risk  Tinetti ME. Performance-Oriented Assessment of Mobility Problems in Elderly Patients. [place] [address]; [id number].  (Scoring Description: PT Bulletin Feb. 10, 1993)    Older adults: Judy Fenton et al, 2009; n = 1000 [facility] elderly evaluated with ABC, BRAXTON, ADL, and IADL)  · Mean BRAXTON score for males aged 69-68 years = 26.21(3.40)  · Mean BRAXTON score for females age 69-68 years = 25.16(4.30)  · Mean BRXATON score for males over 80 years = 23.29(6.02)  · Mean BRAXTON score for females over 80 years = 17.20(8.32)           Pain Ratin/10    Activity Tolerance:   Good and requires rest breaks    After treatment patient left in no apparent distress:   Supine in bed, Heels elevated for pressure relief, Call bell within reach, and Bed / chair alarm activated    COMMUNICATION/EDUCATION:   The patients plan of care was discussed with: Occupational therapist and Registered nurse. Fall prevention education was provided and the patient/caregiver indicated understanding.     Thank you for this referral.  Sharad Tabor, PT   Time Calculation: 27 mins

## 2022-09-08 NOTE — CONSULTS
Infectious Disease Consult Note    Reason for Consult: Leukocytosis  Date of Consultation: September 8, 2022  Date of Admission: 8/29/2022  Referring Physician: Dr Marlena Hicks       HPI:  Ms Kyra Jain is a 81-year-old lady with hypertension, severe alcoholic hepatitis/cirrhosis who was transferred from [facility] for liver disease. Patient is somewhat of a poor historian but denies any significant pain in the abdomen. Denies fevers chills nausea vomiting. She admits to diarrhea but was receiving lactulose for liver disease. She denies cough or shortness of breath. She denies headache or any particular symptoms such as sore throat pruritus focal pain, myalgia. She denies any rash. She denies any vaginal discharge or large. She denies any dysuria particularly. She denies any oral ulcers or dental pain. From chart review at [facility] she had a paracentesis done on 8/29/2022 that showed 50 white cells with 15% neutrophils. Her hepatitis serologies were negative. A CT scan dated 8/4/2022 showed diffuse wall thickening involving the right ascending colon which may represent diffuse colitis versus portal hypertensive colopathy. Underlying neoplasm is considered less likely given the diffuse appearance. There was trace amount of abdominal ascites and pelvic ascites, small amount of edema involving the pancreas likely reactive. Since presentation she has had an elevated white count between the range of 18-26.2. [place] She also has anemia on labs. She has thrombocytopenia. The differential is positive for neutrophil predominance. 8/29/22 WBC 30.3 and 11.8 on 8/8/22 from review         Past Medical History:  Past Medical History:   Diagnosis Date    Liver disease          Surgical History:  Past Surgical History:   Procedure Laterality Date    HX ORTHOPAEDIC Right     hip replacement         Family History:   History reviewed. No pertinent family history.       Social History:     Living Situation: Was living by self before  Tobacco:denied   Alcohol:past   Illicit Drugs:denied   Sexual History: denied recent activity   Travel History denied  Exposures:   Outdoor/Hiking: denied  Animal/Pet: denied  Construction: denied  Hot Tub: denied   Brackish/stagnant exposure: denied      Allergies: Allergies   Allergen Reactions    Lisinopril Nausea Only    Omeprazole Nausea and Vomiting         Review of Systems:  10 point ROS per HPI, all others negative     Medications:  No current facility-administered medications on file prior to encounter. Current Outpatient Medications on File Prior to Encounter   Medication Sig Dispense Refill    zolpidem (AMBIEN) 10 mg tablet zolpidem 10 mg tablet   TK 1 T PO QHS PRN      promethazine (PHENERGAN) 25 mg tablet promethazine 25 mg tablet      predniSONE (DELTASONE) 50 mg tablet prednisone 50 mg tablet      oxyCODONE IR (ROXICODONE) 5 mg immediate release tablet oxycodone 5 mg tablet      omeprazole (PRILOSEC) 20 mg capsule       methylPREDNISolone acetate (DEPO-MEDROL) 80 mg/mL injection 80 mg. LORazepam (ATIVAN) 1 mg tablet lorazepam 1 mg tablet   TAKE ONE TABLET BY MOUTH BID PRN      loratadine 10 mg cap loratadine 10 mg capsule   Take 1 capsule every other day by oral route as needed. HYDROcodone-acetaminophen (NORCO) 5-325 mg per tablet hydrocodone 5 mg-acetaminophen 325 mg tablet   TK 1 T PO Q 8 H PRN      gabapentin (NEURONTIN) 600 mg tablet Take 600 mg by mouth three (3) times daily.       DULoxetine (CYMBALTA) 60 mg capsule duloxetine 60 mg capsule,delayed release      cyclobenzaprine (FLEXERIL) 10 mg tablet cyclobenzaprine 10 mg tablet      carvediloL (COREG) 25 mg tablet carvedilol 25 mg tablet      buPROPion XL (WELLBUTRIN XL) 150 mg tablet bupropion HCl  mg 24 hr tablet, extended release   TK 1 T PO QD      ARIPiprazole (ABILIFY) 2 mg tablet aripiprazole 2 mg tablet      acyclovir (ZOVIRAX) 200 mg capsule acyclovir 200 mg capsule             Physical Exam:    Vitals: Patient Vitals for the past 24 hrs:   Temp Pulse Resp BP SpO2   09/08/22 2001 -- 77 -- -- --   09/08/22 1903 97.6 °F (36.4 °C) 74 22 (!) 96/33 99 %   09/08/22 1800 -- 77 -- -- --   09/08/22 1500 98.1 °F (36.7 °C) 79 16 (!) 98/47 97 %   09/08/22 1400 -- 70 -- -- --   09/08/22 1200 -- 70 -- -- --   09/08/22 1103 98.9 °F (37.2 °C) 80 20 (!) 104/49 99 %   09/08/22 1000 -- 64 -- -- --   09/08/22 0900 -- 71 18 (!) 96/43 100 %   09/08/22 0739 99 °F (37.2 °C) 73 19 (!) 92/40 96 %   09/08/22 0600 -- 76 -- -- --   09/08/22 0402 98.6 °F (37 °C) 77 13 (!) 86/45 96 %   09/08/22 0400 -- 80 -- -- --   09/08/22 0200 -- 78 -- -- --   09/08/22 0053 98.9 °F (37.2 °C) 80 21 (!) 94/42 96 %   09/08/22 0008 -- 83 -- -- --   09/07/22 2200 -- 84 -- -- --   09/07/22 2026 99 °F (37.2 °C) -- -- -- --     GEN: NAD, jaundice   HEENT:+ scleral icterus,   no thrush  CV: S1, S2 heard regularly,   Lungs: Clear to auscultation bilaterally  Abdomen: soft, distended, non tender   Neuro: Alert, oriented to self, place and situation, moves all extremities to commands, verbal   Skin: no rash  Psych: flat affect   MSK no erythema of knees, ankles, wrists       Labs:   Recent Results (from the past 24 hour(s))   RENAL FUNCTION PANEL    Collection Time: 09/08/22  6:03 AM   Result Value Ref Range    Sodium 137 136 - 145 mmol/L    Potassium 3.7 3.5 - 5.1 mmol/L    Chloride 98 97 - 108 mmol/L    CO2 28 21 - 32 mmol/L    Anion gap 11 5 - 15 mmol/L    Glucose 92 65 - 100 mg/dL    BUN 57 (H) 6 - 20 MG/DL    Creatinine 2.07 (H) 0.55 - 1.02 MG/DL    BUN/Creatinine ratio 28 (H) 12 - 20      GFR est AA 30 (L) >60 ml/min/1.73m2    GFR est non-AA 24 (L) >60 ml/min/1.73m2    Calcium 8.7 8.5 - 10.1 MG/DL    Phosphorus 3.6 2.6 - 4.7 MG/DL    Albumin 3.5 3.5 - 5.0 g/dL   SAMPLES BEING HELD    Collection Time: 09/08/22  6:03 AM   Result Value Ref Range    SAMPLES BEING HELD 1LAV     COMMENT        Add-on orders for these samples will be processed based on acceptable specimen integrity and analyte stability, which may vary by analyte. MAGNESIUM    Collection Time: 09/08/22  6:03 AM   Result Value Ref Range    Magnesium 1.5 (L) 1.6 - 2.4 mg/dL       Microbiology Data:      CULTURE, BODY FLUID Najma Rowe  Order: [id number]  Collected 8/31/2022 15:35    Status: Final result    Specimen Information: Abdominal Fluid; Body Fluid        0 Result Notes  Component Ref Range & Units 8/31/22 1535    Special Requests:   NO SPECIAL REQUESTS    GRAM STAIN   RARE WBCS SEEN    GRAM STAIN   NO ORGANISMS SEEN    Culture result:   NO GROWTH 4 DAYS         Date: 9/4/2022 Department: [facility] Released By/Authorizing: Rory Arrington MD (auto-released)     CULTURE, BLOOD, PAIRED  Order: [id number]  Collected 9/4/2022 14:21    Status: Preliminary result    Specimen Information: Blood        0 Result Notes  Component Ref Range & Units 9/4/22 1421    Special Requests:   NO SPECIAL REQUESTS P    Culture result:   NO GROWTH 4 DAYS P         Pathology Results:    Peritoneal Fluid, ThinPrep and cell block:   No cells diagnostic for malignancy     Imaging:     CT ABD PELV WO CONT: Patient Communication     Add Comments   Not seen     Study Result    Narrative & Impression   EXAM: CT CHEST WO CONT, CT ABD PELV WO CONT     INDICATION: Leukocytosis, evaluate for pneumonia     COMPARISON: None     TECHNIQUE:   Thin axial images were obtained through the chest, abdomen and pelvis, without  administration of IV or oral contrast. Coronal and sagittal reformats were  generated. CT dose reduction was achieved through use of a standardized protocol  tailored for this examination and automatic exposure control for dose  modulation. FINDINGS:      CHEST WALL: No mass or axillary lymphadenopathy. THYROID: No nodule. MEDIASTINUM: No mass or lymphadenopathy. LUCAS: No mass or lymphadenopathy. THORACIC AORTA: No dissection or aneurysm.   MAIN PULMONARY ARTERY: Normal in caliber. TRACHEA/BRONCHI: Patent. ESOPHAGUS: No wall thickening or dilatation. HEART: Top normal heart size with small pericardial fluid. PLEURA: Trace pleural fluid bilaterally. No pneumothorax. LUNGS: No suspicious pulmonary nodule, focal consolidations, or masses. LIVER: Cirrhotic liver. No obvious mass, within the limitation of lack of IV  contrast.  BILIARY TREE: Distended gallbladder. CBD is not dilated. SPLEEN: within normal limits. PANCREAS: No mass or ductal dilatation. ADRENALS: Unremarkable. KIDNEYS: No mass, calculus, or hydronephrosis. STOMACH: Unremarkable. SMALL BOWEL: There is wall thickening involving proximal small bowel loops,  likely secondary to cirrhosis. COLON: There is wall thickening involving cecum/ascending colon. APPENDIX: Not visualized. PERITONEUM: Large volume ascites. No pneumoperitoneum. RETROPERITONEUM: No abdominal aortic aneurysm. Small prominent retroperitoneal  lymph nodes are likely reactive. REPRODUCTIVE ORGANS: Unremarkable. URINARY BLADDER: No mass or calculus. BONES: Right shoulder total arthroplasty. Orthopedic hardware involving  bilateral proximal femurs. ABDOMINAL WALL: Right inguinal hernia containing small volume ascites. ADDITIONAL COMMENTS: N/A     IMPRESSION  1. No evidence of pneumonia. 2.  Wall thickening involving the right colon, suggestive of  infectious/inflammatory colitis. 3.  Cirrhosis and large volume ascites. 4.  Distended gallbladder, commonly seen in the setting of prolonged fasting. Correlate with physical examination. Procedures:   8/31/22 US guided paracentesis with drainage catheter     Assessment / Plan:       Ms Negin Devine is a 67-year-old lady with hypertension, severe alcoholic hepatitis/cirrhosis who was transferred from [facility] for liver disease.  ID consulted for leukocytosis    1) leukocytosis   Persistent and worse 8/29/22 at [address] [place] [address] for superimposed infection but no clear evidence at present Blood cx negative  No clinical or radiographic evidence of pneumonia   Ascitic fluid and wbc not suggestive of SBP  Has been on empiric ceftriaxone earlier in admission    CT with wall thickening involving the right colon. This was also present on CT in August at outside hospital.   Patient has had diarrhea but also received lactulose for liver disease  At risk for C. Difficile. If There is clinical worsening, worsening diarrhea /abdominal pain, consider but doubt clinically at present as patient says she feels better overall    will check a peripheral smear  Can see leukocytosis from a stress-induced reaction as well    2) alcoholic cirrhosis  3) HTN  4) RANDY        Thank for the opportunity to participate in the care of this patient. Please contact with questions or concerns.        Sarah Horvath,   8:42 PM

## 2022-09-08 NOTE — PROGRESS NOTES
Problem: Falls - Risk of  Goal: *Absence of Falls  Description: Document Leafy Velázquez Fall Risk and appropriate interventions in the flowsheet. Outcome: Progressing Towards Goal  Note: Fall Risk Interventions:  Mobility Interventions: Bed/chair exit alarm, Communicate number of staff needed for ambulation/transfer, Patient to call before getting OOB    Mentation Interventions: Bed/chair exit alarm, Door open when patient unattended, Increase mobility, More frequent rounding, Reorient patient    Medication Interventions: Bed/chair exit alarm, Evaluate medications/consider consulting pharmacy, Patient to call before getting OOB, Teach patient to arise slowly    Elimination Interventions: Bed/chair exit alarm, Call light in reach, Patient to call for help with toileting needs, Toileting schedule/hourly rounds    History of Falls Interventions: Bed/chair exit alarm, Door open when patient unattended         Problem: Pressure Injury - Risk of  Goal: *Prevention of pressure injury  Description: Document Chandana Scale and appropriate interventions in the flowsheet.   Outcome: Progressing Towards Goal  Note: Pressure Injury Interventions:  Sensory Interventions: Assess changes in LOC, Avoid rigorous massage over bony prominences, Check visual cues for pain, Float heels, Keep linens dry and wrinkle-free    Moisture Interventions: Absorbent underpads, Internal/External urinary devices, Check for incontinence Q2 hours and as needed, Minimize layers    Activity Interventions: Assess need for specialty bed, Pressure redistribution bed/mattress(bed type), Increase time out of bed    Mobility Interventions: Assess need for specialty bed, Float heels, HOB 30 degrees or less, Pressure redistribution bed/mattress (bed type)    Nutrition Interventions: Document food/fluid/supplement intake, Offer support with meals,snacks and hydration    Friction and Shear Interventions: Apply protective barrier, creams and emollients, HOB 30 degrees or less, Lift team/patient mobility team, Minimize layers                Problem: Hypotension  Goal: *Blood pressure within specified parameters  Outcome: Progressing Towards Goal  Goal: *Fluid volume balance  Outcome: Progressing Towards Goal  Goal: *Labs within defined limits  Outcome: Progressing Towards Goal     Problem: General Medical Care Plan  Goal: *Vital signs within specified parameters  Outcome: Progressing Towards Goal  Goal: *Labs within defined limits  Outcome: Progressing Towards Goal  Goal: *Absence of infection signs and symptoms  Outcome: Progressing Towards Goal  Goal: *Optimal pain control at patient's stated goal  Outcome: Progressing Towards Goal  Goal: *Skin integrity maintained  Outcome: Progressing Towards Goal  Goal: *Fluid volume balance  Outcome: Progressing Towards Goal  Goal: *Optimize nutritional status  Outcome: Progressing Towards Goal  Goal: *Anxiety reduced or absent  Outcome: Progressing Towards Goal  Goal: *Progressive mobility and function (eg: ADL's)  Outcome: Progressing Towards Goal     Problem:  Body Temperature -  Risk of, Imbalanced  Goal: *Absence of cold stress or hypothermia signs and symptoms  Outcome: Progressing Towards Goal     Problem: Patient Education: Go to Patient Education Activity  Goal: Patient/Family Education  Outcome: Progressing Towards Goal     Problem: Patient Education: Go to Patient Education Activity  Goal: Patient/Family Education  Outcome: Progressing Towards Goal

## 2022-09-09 LAB
ALBUMIN SERPL-MCNC: 3.7 G/DL (ref 3.5–5)
ANION GAP SERPL CALC-SCNC: 8 MMOL/L (ref 5–15)
BACTERIA SPEC CULT: NORMAL
BUN SERPL-MCNC: 64 MG/DL (ref 6–20)
BUN/CREAT SERPL: 23 (ref 12–20)
CALCIUM SERPL-MCNC: 8.5 MG/DL (ref 8.5–10.1)
CHLORIDE SERPL-SCNC: 98 MMOL/L (ref 97–108)
CO2 SERPL-SCNC: 30 MMOL/L (ref 21–32)
CREAT SERPL-MCNC: 2.79 MG/DL (ref 0.55–1.02)
GLUCOSE SERPL-MCNC: 105 MG/DL (ref 65–100)
PERIPHERAL SMEAR,PSM: NORMAL
PHOSPHATE SERPL-MCNC: 3.1 MG/DL (ref 2.6–4.7)
POTASSIUM SERPL-SCNC: 3.5 MMOL/L (ref 3.5–5.1)
SERVICE CMNT-IMP: NORMAL
SODIUM SERPL-SCNC: 136 MMOL/L (ref 136–145)

## 2022-09-09 PROCEDURE — 74011000250 HC RX REV CODE- 250: Performed by: INTERNAL MEDICINE

## 2022-09-09 PROCEDURE — P9045 ALBUMIN (HUMAN), 5%, 250 ML: HCPCS | Performed by: FAMILY MEDICINE

## 2022-09-09 PROCEDURE — 77030038269 HC DRN EXT URIN PURWCK BARD -A

## 2022-09-09 PROCEDURE — 99232 SBSQ HOSP IP/OBS MODERATE 35: CPT | Performed by: INTERNAL MEDICINE

## 2022-09-09 PROCEDURE — 65660000001 HC RM ICU INTERMED STEPDOWN

## 2022-09-09 PROCEDURE — 80069 RENAL FUNCTION PANEL: CPT

## 2022-09-09 PROCEDURE — 36415 COLL VENOUS BLD VENIPUNCTURE: CPT

## 2022-09-09 PROCEDURE — P9047 ALBUMIN (HUMAN), 25%, 50ML: HCPCS | Performed by: INTERNAL MEDICINE

## 2022-09-09 PROCEDURE — 97530 THERAPEUTIC ACTIVITIES: CPT

## 2022-09-09 PROCEDURE — 74011250636 HC RX REV CODE- 250/636: Performed by: INTERNAL MEDICINE

## 2022-09-09 PROCEDURE — 74011250636 HC RX REV CODE- 250/636: Performed by: FAMILY MEDICINE

## 2022-09-09 PROCEDURE — 74011250637 HC RX REV CODE- 250/637: Performed by: INTERNAL MEDICINE

## 2022-09-09 PROCEDURE — 74011636637 HC RX REV CODE- 636/637: Performed by: INTERNAL MEDICINE

## 2022-09-09 PROCEDURE — C9113 INJ PANTOPRAZOLE SODIUM, VIA: HCPCS | Performed by: INTERNAL MEDICINE

## 2022-09-09 PROCEDURE — 74011250637 HC RX REV CODE- 250/637: Performed by: HOSPITALIST

## 2022-09-09 RX ORDER — ALBUMIN HUMAN 50 G/1000ML
12.5 SOLUTION INTRAVENOUS ONCE
Status: COMPLETED | OUTPATIENT
Start: 2022-09-09 | End: 2022-09-09

## 2022-09-09 RX ADMIN — MIDODRINE HYDROCHLORIDE 15 MG: 5 TABLET ORAL at 21:44

## 2022-09-09 RX ADMIN — NYSTATIN 500000 UNITS: 100000 SUSPENSION ORAL at 17:29

## 2022-09-09 RX ADMIN — POLYETHYLENE GLYCOL 3350 17 G: 17 POWDER, FOR SOLUTION ORAL at 11:19

## 2022-09-09 RX ADMIN — NYSTATIN 500000 UNITS: 100000 SUSPENSION ORAL at 08:59

## 2022-09-09 RX ADMIN — SODIUM CHLORIDE, PRESERVATIVE FREE 10 ML: 5 INJECTION INTRAVENOUS at 07:26

## 2022-09-09 RX ADMIN — MIDODRINE HYDROCHLORIDE 15 MG: 5 TABLET ORAL at 07:26

## 2022-09-09 RX ADMIN — SODIUM BICARBONATE 650 MG: 650 TABLET ORAL at 17:28

## 2022-09-09 RX ADMIN — BUPROPION HYDROCHLORIDE 150 MG: 150 TABLET, EXTENDED RELEASE ORAL at 08:58

## 2022-09-09 RX ADMIN — ALBUMIN (HUMAN) 12.5 G: 12.5 INJECTION, SOLUTION INTRAVENOUS at 14:35

## 2022-09-09 RX ADMIN — MIDODRINE HYDROCHLORIDE 15 MG: 5 TABLET ORAL at 13:28

## 2022-09-09 RX ADMIN — ALBUMIN (HUMAN) 25 G: 0.25 INJECTION, SOLUTION INTRAVENOUS at 21:44

## 2022-09-09 RX ADMIN — ACETAMINOPHEN 650 MG: 325 TABLET, FILM COATED ORAL at 11:19

## 2022-09-09 RX ADMIN — Medication 1 CAPSULE: at 08:58

## 2022-09-09 RX ADMIN — HEPARIN SODIUM 5000 UNITS: 5000 INJECTION INTRAVENOUS; SUBCUTANEOUS at 01:02

## 2022-09-09 RX ADMIN — SODIUM CHLORIDE, PRESERVATIVE FREE 10 ML: 5 INJECTION INTRAVENOUS at 21:45

## 2022-09-09 RX ADMIN — NYSTATIN 500000 UNITS: 100000 SUSPENSION ORAL at 21:44

## 2022-09-09 RX ADMIN — NYSTATIN 500000 UNITS: 100000 SUSPENSION ORAL at 13:28

## 2022-09-09 RX ADMIN — SODIUM BICARBONATE 650 MG: 650 TABLET ORAL at 08:58

## 2022-09-09 RX ADMIN — PANTOPRAZOLE SODIUM 40 MG: 40 INJECTION, POWDER, FOR SOLUTION INTRAVENOUS at 07:26

## 2022-09-09 RX ADMIN — SODIUM CHLORIDE 500 ML: 9 INJECTION, SOLUTION INTRAVENOUS at 14:32

## 2022-09-09 RX ADMIN — OCTREOTIDE ACETATE 50 MCG/HR: 500 INJECTION, SOLUTION INTRAVENOUS; SUBCUTANEOUS at 09:39

## 2022-09-09 RX ADMIN — SODIUM CHLORIDE, PRESERVATIVE FREE 10 ML: 5 INJECTION INTRAVENOUS at 17:29

## 2022-09-09 RX ADMIN — HEPARIN SODIUM 5000 UNITS: 5000 INJECTION INTRAVENOUS; SUBCUTANEOUS at 17:29

## 2022-09-09 RX ADMIN — HEPARIN SODIUM 5000 UNITS: 5000 INJECTION INTRAVENOUS; SUBCUTANEOUS at 08:58

## 2022-09-09 RX ADMIN — ALBUMIN (HUMAN) 25 G: 0.25 INJECTION, SOLUTION INTRAVENOUS at 08:59

## 2022-09-09 RX ADMIN — OCTREOTIDE ACETATE 50 MCG/HR: 500 INJECTION, SOLUTION INTRAVENOUS; SUBCUTANEOUS at 21:45

## 2022-09-09 RX ADMIN — ARIPIPRAZOLE 2 MG: 2 TABLET ORAL at 08:59

## 2022-09-09 NOTE — PROGRESS NOTES
1940 Bedside and Verbal shift change report given to Juan Avilez, RN and Rolando Carvajal, student nurse (oncoming nurse) by Luzmaria Keller (offgoing nurse). Report included the following information SBAR, Kardex, Intake/Output, MAR, Recent Results, and Cardiac Rhythm NSR .  0740 Bedside and Verbal shift change report given to [facility] (oncoming nurse) by Juan Avilez RN and Rolando Carvajal student RN (offgoing nurse). Report included the following information SBAR, Kardex, Intake/Output, MAR, and Cardiac Rhythm NSR . Problem: Falls - Risk of  Goal: *Absence of Falls  Description: Document Rosi Brantley Fall Risk and appropriate interventions in the flowsheet. Outcome: Progressing Towards Goal  Note: Fall Risk Interventions:  Mobility Interventions: Bed/chair exit alarm    Mentation Interventions: Bed/chair exit alarm    Medication Interventions: Bed/chair exit alarm    Elimination Interventions: Bed/chair exit alarm    History of Falls Interventions: Bed/chair exit alarm         Problem: Pressure Injury - Risk of  Goal: *Prevention of pressure injury  Description: Document Chandana Scale and appropriate interventions in the flowsheet.   Outcome: Progressing Towards Goal  Note: Pressure Injury Interventions:  Sensory Interventions: Assess changes in LOC    Moisture Interventions: Absorbent underpads    Activity Interventions: Assess need for specialty bed    Mobility Interventions: Assess need for specialty bed    Nutrition Interventions: Document food/fluid/supplement intake    Friction and Shear Interventions: Apply protective barrier, creams and emollients                Problem: Hypotension  Goal: *Blood pressure within specified parameters  Outcome: Progressing Towards Goal  Goal: *Fluid volume balance  Outcome: Progressing Towards Goal  Goal: *Labs within defined limits  Outcome: Progressing Towards Goal     Problem: Patient Education: Go to Patient Education Activity  Goal: Patient/Family Education  Outcome: Progressing Towards Goal     Problem: General Medical Care Plan  Goal: *Vital signs within specified parameters  Outcome: Progressing Towards Goal  Goal: *Labs within defined limits  Outcome: Progressing Towards Goal  Goal: *Absence of infection signs and symptoms  Outcome: Progressing Towards Goal  Goal: *Optimal pain control at patient's stated goal  Outcome: Progressing Towards Goal  Goal: *Skin integrity maintained  Outcome: Progressing Towards Goal  Goal: *Fluid volume balance  Outcome: Progressing Towards Goal  Goal: *Optimize nutritional status  Outcome: Progressing Towards Goal  Goal: *Anxiety reduced or absent  Outcome: Progressing Towards Goal  Goal: *Progressive mobility and function (eg: ADL's)  Outcome: Progressing Towards Goal     Problem: Patient Education: Go to Patient Education Activity  Goal: Patient/Family Education  Outcome: Progressing Towards Goal     Problem:  Body Temperature -  Risk of, Imbalanced  Goal: *Absence of cold stress or hypothermia signs and symptoms  Outcome: Progressing Towards Goal     Problem: Patient Education: Go to Patient Education Activity  Goal: Patient/Family Education  Outcome: Progressing Towards Goal     Problem: Patient Education: Go to Patient Education Activity  Goal: Patient/Family Education  Outcome: Progressing Towards Goal

## 2022-09-09 NOTE — PROGRESS NOTES
0730: Bedside and Verbal shift change report given to [address] [place] [address] (oncoming nurse) by Poly Munoz (offgoing nurse). Report included the following information SBAR, Kardex, Intake/Output, and MAR.   1400: During dressing change of paracentesis drain the line was noted to be kinked, line was de-kinked. Drain started to flow. 1415: 950 mL of fluid removed from  drain collection bag.  1420: patient moved to bedside commode  1425: 625 mL of fluid removed from  drain  1430: patient moved off toilet  1435: bp taken, 70s/30s  1437: rapid called  1439: 500mL normal saline bolus given, 12.5g 5% albumin administered. 1442: B/P recovered and rapid response ended. 1445: Dr. Andrez Puckett instructed to clamp paracentesis drain until 9/10 0700  1600: Patient stable and back in bed  1900: Bedside and Verbal shift change report given to Rosa PAK (oncoming nurse) by [address] [place] [address] (offgoing nurse). Report included the following information SBAR, Kardex, Intake/Output, and MAR.

## 2022-09-09 NOTE — PROGRESS NOTES
Renal Progress Note    NAME:  Allie Mitchell   :   1960   MRN:   [id number]     Date/Time:  2022   Subjective:       , feels ok, no N/v/SOB. Worry about her pets   seen and examined, has no complaint, not oriented to place or time, had ascitis drain placed by IR, 3 lit drained sofar, 400 ml uo   more sleepy and less interactive, does not answer the questions. Oliguric, no significant change in cr. Possibly HRS. WBC still elevated. Has NGT on TF  9/3 awake, weak, denies pain, NGT is out and will be replaced. Had 600 ml urine/o, BP better, cr stable   more awake and alert, interactive today, wants to eat, , renal function improving, unable to place back the NGT, still significant leukocytosis. Denies abd pain, cough   awake and alert, denies pain, nausea, abd pain. On po diet now, refused TF. Renal function improving, borderline UO     sleeping, UO borderline 450ml, still draining>1.5 lit /day from ascitis, persistent leukocytosis, OFF Abx. Renal function improving slowly.  sitting in chair, feels ok, appetite better. Renal function improving   awake, denies complaints, low po intake,  ml, paredes has been removed. Renal function improving    22 --> F/U RANDY    Reported abdominal discomfort.     Medications reviewed:  Current Facility-Administered Medications   Medication Dose Route Frequency    albumin human 25% (BUMINATE) solution 25 g  25 g IntraVENous Q12H    nystatin (MYCOSTATIN) 100,000 unit/mL oral suspension 500,000 Units  500,000 Units Oral QID    [Held by provider] sodium bicarbonate (8.4%) 150 mEq in dextrose 5% 1,000 mL infusion   IntraVENous CONTINUOUS    lactulose (CHRONULAC) 10 gram/15 mL solution 30 mL  20 g Oral DAILY    midodrine (PROAMATINE) tablet 15 mg  15 mg Oral Q8H    octreotide (SANDOSTATIN) 500 mcg in 0.9% sodium chloride 500 mL infusion  50 mcg/hr IntraVENous CONTINUOUS    sodium bicarbonate tablet 650 mg  650 mg Oral BID    0.9% sodium chloride infusion 250 mL  250 mL IntraVENous PRN    [Held by provider] oxyCODONE IR (ROXICODONE) tablet 5 mg  5 mg Oral Q4H PRN    [Held by provider] cefTRIAXone (ROCEPHIN) 2 g in 0.9% sodium chloride 20 mL IV syringe  2 g IntraVENous Q24H    ARIPiprazole (ABILIFY) tablet 2 mg  2 mg Oral DAILY    buPROPion SR (WELLBUTRIN SR) tablet 150 mg  150 mg Oral DAILY    [Held by provider] LORazepam (ATIVAN) tablet 1 mg  1 mg Oral BID PRN    pantoprazole (PROTONIX) 40 mg in 0.9% sodium chloride 10 mL injection  40 mg IntraVENous ACB    sodium chloride (NS) flush 5-40 mL  5-40 mL IntraVENous Q8H    sodium chloride (NS) flush 5-40 mL  5-40 mL IntraVENous PRN    acetaminophen (TYLENOL) tablet 650 mg  650 mg Oral Q6H PRN    Or    acetaminophen (TYLENOL) suppository 650 mg  650 mg Rectal Q6H PRN    polyethylene glycol (MIRALAX) packet 17 g  17 g Oral DAILY PRN    ondansetron (ZOFRAN ODT) tablet 4 mg  4 mg Oral Q8H PRN    Or    ondansetron (ZOFRAN) injection 4 mg  4 mg IntraVENous Q6H PRN    heparin (porcine) injection 5,000 Units  5,000 Units SubCUTAneous Q8H    L.acidophilus-paracasei-S.thermophil-bifidobacter (RISAQUAD) 8 billion cell capsule  1 Capsule Oral DAILY    nicotine (NICODERM CQ) 21 mg/24 hr patch 1 Patch  1 Patch TransDERmal DAILY        Objective:   Vitals:  Visit Vitals  BP (!) 96/44 (BP 1 Location: Right upper arm)   Pulse 71   Temp 98.8 °F (37.1 °C)   Resp 17   Ht 5' 3\" (1.6 m)   Wt 50.4 kg (111 lb 1.8 oz)   SpO2 97%   BMI 19.68 kg/m²     Temp (24hrs), Av.5 °F (36.9 °C), Min:97.6 °F (36.4 °C), Max:99.7 °F (37.6 °C)      O2 Device: None (Room air)    Last 24hr Input/Output:    Intake/Output Summary (Last 24 hours) at 2022 1124  Last data filed at 2022 0350  Gross per 24 hour   Intake 1085.83 ml   Output 200 ml   Net 885.83 ml          Physical Exam:    Seen in Room 464.     General: Chronically ill-looking lady , jaundiced, awake , confused    HEENT:  sclerae icteric,    Neck: Supple    Lungs : Clear to auscultation, no wheezes, no rales    CVS: RRR, S1 S2 normal, No rub, no  LE edema    Abdomen: distended ++,   Ascites+,    Extremities: No edema    Skin: jaundice    Neurologic: Awake and interacting    Lab Data Reviewed:    Recent Results (from the past 24 hour(s))   RENAL FUNCTION PANEL    Collection Time: 09/09/22  2:19 AM   Result Value Ref Range    Sodium 136 136 - 145 mmol/L    Potassium 3.5 3.5 - 5.1 mmol/L    Chloride 98 97 - 108 mmol/L    CO2 30 21 - 32 mmol/L    Anion gap 8 5 - 15 mmol/L    Glucose 105 (H) 65 - 100 mg/dL    BUN 64 (H) 6 - 20 MG/DL    Creatinine 2.79 (H) 0.55 - 1.02 MG/DL    BUN/Creatinine ratio 23 (H) 12 - 20      GFR est AA 21 (L) >60 ml/min/1.73m2    GFR est non-AA 17 (L) >60 ml/min/1.73m2    Calcium 8.5 8.5 - 10.1 MG/DL    Phosphorus 3.1 2.6 - 4.7 MG/DL    Albumin 3.7 3.5 - 5.0 g/dL         Assessment/Plan:   Principal Problem:    Alcoholic cirrhosis of liver with ascites (HCC) (8/30/2022)    Active Problems:    Severe protein-calorie malnutrition ([facility] [address] (8/31/2022)     Acute kidney injury -cr 0.4 in early August,   -RANDY/possible ATN due to intravascular volume depletion in setting of poor po intake, hypotension /third spacing and possible sepsis/NSAIDs. Hepatorenal in DDx . Low FeNa-low urine output  Possible HRS. Not a candidate liver transplant Per Dr Ama Purvis. Not a candidate for RRT.    -renal function improving  -c/w  po bicarb   -off tube feeding-nutritional support  -strict Is and Os  -avoid nephrotoxins, IV contrast/NSAIDs  -dose meds per GFR  -f/up  renal function  - has persistent leukocytosis and worsening, ID consulted  -c/w with IV albumin, change to q12hr, octreotide and midodrine       Hypotension-with h/o HTN on Coreg, due to effective intravascular volume depletion  -on prednisone per med list, but patient denies taking that.  -midodrine     Hyponatremia, due to liver cirrhosis and volume depletion, chronic since early August  -improving with IVF  -Avoid rapid correction  -improved    Hypokalemia/hypophosphatemia, hypomagnesemia   -replete prn  -check Mg    Alcoholic liver cirrhosis-decompensated. -Hypoalbuminemia/ascitis/coagulopathy-nl ammonia  -hepatology following-not a candidate for liver transplant.  Poor prognosis  - on lactulose    Anemia  -high iron stores  -Hb has dropped, might be due to hydration, r/o bleed  -transfuse for Hb<7    Persistent Leukocytosis: off Rocephin-source of infection? -manegaement per primary team    Hypoglycemia, resolved    DNR    Poor prognosis  ___________________________________________________    Total time spent with patient:  [] 15   [] 25   [x] 35   []  __ minutes    [] Critical Care Provided    Care Plan discussed with        [x] Patient   [] Family    [] Care Manager   [] Consultant/Specialist :      []   >50% of visit spent in counseling and coordination of care   (Discussed [] CODE status,  [] Care Plan, [] D/C Planning)    ___________________________________________________    Attending Physician: Lopez Montoya, [facility]

## 2022-09-09 NOTE — PROGRESS NOTES
[facility] Adult  Hospitalist Group                                                                                          Hospitalist Progress Note  Domingo Bobo MD  Answering service: [phone] OR 36 from in house phone        Date of Service:  2022  NAME:  Keyur Landa  :  1960  MRN:  [id number]      Admission Summary:   Per H&P, This is a 41-year-old woman with a past medical history significant for hypertension, alcoholic liver cirrhosis, who presented at the emergency room with abdominal pain. The patient presented at [facility] Emergency Room. When the patient arrived at the emergency room, the patient was found to have decompensation of liver cirrhosis. The hospitalist service at [facility] was asked to directly admit the patient to [facility], so that the patient can be seen by the hepatologist.  The patient came to the emergency room at [facility], because of lack of bed on the on the medical floor. When the patient arrived at the emergency room, the patient's lab work shows significant abnormality. She was subsequently referred to the hospitalist service for admission. No record of prior admission to this hospital.  It was reported that the patient underwent abdominal paracentesis before coming to [facility].  The amount of fluid removed is not known. The patient's abdominal pain is diffuse in location, dull ache, constant, 7/10 in severity with no known aggravating or relieving factors associated with nausea, but no vomiting, no diarrhea, and no constipation. Interval history / Subjective:   Wants to advance diet, states she isn't eating much because she doesn't like the food. Denies pain, dyspnea, n/v/d.      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 Serum ammonia was mildly elevated. Likely developed HE. Will continue lactulose at low dose with goal 2 bm's daily. Severe protein calorie malnutrition  Muscle wasting, bitemporal wasting. Due to her disease process  DHT advanced to duodenum 9/1 and resumed TFs - FT came out overnight, could not be replaced. Became more alert after lactulose so now on diet but PO intake remains poor. Refusing TF placement now. Discussed at length with her to at least finish her Ensures otherwise the FT has to be reinserted else she won't survive due to malnutrition. Acute kidney injury  Suspected ATN, IVVD due to poor PO intake, hypotension, NSAID use. HRS on ddx. No improvement with IV albumin but continue the same. IVF resumed 9/4 due to NGT being out. D/C bicarb drip today as she is developing an alkalosis. Renal function now improving. Hypotension  Continue midodrine, IV albumin (now off) and IV fluids as above    Anemia  Iron studies consistent with chronic disease  Transfuse PRN - unit ordered for 9/2  Vitamin K per hepatology for coagulopathy    Persistent leukocytosis: unclear etiology, no evidence of infection clinically. Afebrile. Has been on empiric IV ceftriaxone for 6 days, stopped 9/4. Sent UA and blood cultures. UA with yeast and bacteria, minimal pyuria, unlikely source of leukocytosis. Removed paredes 9/6. Follow-up blood cultures. Peritoneal fluid cell counts now c/w peritonitis. Consider chest/abd CT but utility is low given she can't get contrast due to renal failure, and clinically does not appear infected.     History of HTN    Labs off today - repeat before basic any treatments on these    Code status: DNR - continue full current care otherwise but do not escalate (if pt rapidly deteriorates make comfort care and inform sister over the phone 24/7). See 9/2 PN for my discussion with her. Prophylaxis: SCD  Care Plan discussed with: RN     Hospital Problems  Date Reviewed: 8/30/2022            Codes Class Noted POA    Severe protein-calorie malnutrition ([facility] [address] (Chronic) ICD-10-CM: X29  ICD-9-CM: 822  8/31/2022 Yes        * (Principal) Alcoholic cirrhosis of liver with ascites ([facility] [address] ICD-10-CM: K70.31  ICD-9-CM: 571.2  8/30/2022 Yes       Review of Systems:   Review of systems not obtained due to patient factors. Vital Signs:    Last 24hrs VS reviewed since prior progress note.  Most recent are:  Visit Vitals  BP (!) 96/33 (BP 1 Location: Right upper arm, BP Patient Position: At rest;Supine;Reclining)   Pulse 77   Temp 97.6 °F (36.4 °C)   Resp 22   Ht 5' 3\" (1.6 m)   Wt 50.4 kg (111 lb 1.8 oz)   SpO2 99%   BMI 19.68 kg/m²     Patient Vitals for the past 24 hrs:   Temp Pulse Resp BP SpO2   09/08/22 2207 -- 77 -- -- --   09/08/22 2001 -- 77 -- -- --   09/08/22 1903 97.6 °F (36.4 °C) 74 22 (!) 96/33 99 %   09/08/22 1800 -- 77 -- -- --   09/08/22 1500 98.1 °F (36.7 °C) 79 16 (!) 98/47 97 %   09/08/22 1400 -- 70 -- -- --   09/08/22 1200 -- 70 -- -- --   09/08/22 1103 98.9 °F (37.2 °C) 80 20 (!) 104/49 99 %   09/08/22 1000 -- 64 -- -- --   09/08/22 0900 -- 71 18 (!) 96/43 100 %   09/08/22 0739 99 °F (37.2 °C) 73 19 (!) 92/40 96 %   09/08/22 0600 -- 76 -- -- --   09/08/22 0402 98.6 °F (37 °C) 77 13 (!) 86/45 96 %   09/08/22 0400 -- 80 -- -- --   09/08/22 0200 -- 78 -- -- --   09/08/22 0053 98.9 °F (37.2 °C) 80 21 (!) 94/42 96 %   09/08/22 0008 -- 83 -- -- --       Intake/Output Summary (Last 24 hours) at 9/8/2022 2315  Last data filed at 9/8/2022 2015  Gross per 24 hour   Intake 1371.66 ml   Output 1125 ml   Net 246.66 ml          Physical Examination:     I had a face to face encounter with this patient and independently examined them on 9/8/2022 as outlined below:          Constitutional:  NAD, cachectic, chronically-ill appearing   ENT:  Oral mucosa moist, Icteric sclerae    Resp:  CTA bilaterally. No wheezing/rhonchi/rales. No accessory muscle use. CV:  Regular rhythm, normal rate, soft systolic murmur     GI: Soft, more distended today, NT, bs+    Musculoskeletal:  No edema, warm, 2+     Neurologic:  Grossly non-focal, alert currently but intermittently drowsy  Psych: not anxious nor agitated     Skin: jaundiced       Data Review:    Review and/or order of clinical lab test  Review and/or order of tests in the radiology section of [facility]      Labs:     Recent Labs     09/07/22  0004 09/06/22  0915   WBC 26.2* 22.8*   HGB 8.7* 8.7*   HCT 25.1* 24.3*   PLT 86* 89*       Recent Labs     09/08/22 0603 09/07/22  0004 09/06/22  0915 09/06/22  0653    139 140 140   K 3.7 3.4* 2.6* 2.2*   CL 98 98 102 96*   CO2 28 31 29 38*   BUN 57* 53* 54* 46*   CREA 2.07* 2.39* 2.51* 2.12*   GLU 92 113* 132* 533*   CA 8.7 8.4* 9.0 7.1*   MG 1.5* 1.5*  --   --    PHOS 3.6 1.5*  --  1.3*       Recent Labs     09/08/22 0603 09/07/22  0004 09/06/22  0653   ALB 3.5 3.6 3.0*       No results for input(s): INR, PTP, APTT, INREXT, INREXT in the last 72 hours. No results for input(s): FE, TIBC, PSAT, FERR in the last 72 hours. Lab Results   Component Value Date/Time    Folate 14.7 08/30/2022 01:52 AM        No results for input(s): PH, PCO2, PO2 in the last 72 hours. No results for input(s): CPK, CKNDX, TROIQ in the last 72 hours.     No lab exists for component: CPKMB  No results found for: CHOL, CHOLX, CHLST, CHOLV, HDL, HDLP, LDL, LDLC, DLDLP, TGLX, TRIGL, TRIGP, CHHD, CHHDX  Lab Results   Component Value Date/Time    Glucose (POC) 145 (H) 09/06/2022 11:20 AM    Glucose (POC) 131 (H) 09/05/2022 11:55 PM    Glucose (POC) 171 (H) 09/03/2022 12:06 AM    Glucose (POC) 154 (H) 09/02/2022 01:43 PM    Glucose (POC) 113 09/02/2022 06:11 AM     Lab Results   Component Value Date/Time    Color DARK YELLOW 09/04/2022 05:22 PM    Appearance TURBID (A) 09/04/2022 05:22 PM    Specific gravity 1.017 09/04/2022 05:22 PM    pH (UA) 5.5 09/04/2022 05:22 PM    Protein 100 (A) 09/04/2022 05:22 PM    Glucose Negative 09/04/2022 05:22 PM    Ketone Negative 09/04/2022 05:22 PM    Urobilinogen 1.0 09/04/2022 05:22 PM    Nitrites Positive (A) 09/04/2022 05:22 PM    Leukocyte Esterase LARGE (A) 09/04/2022 05:22 PM    Epithelial cells FEW 09/04/2022 05:22 PM    Bacteria 1+ (A) 09/04/2022 05:22 PM    WBC 5-10 09/04/2022 05:22 PM    RBC 5-10 09/04/2022 05:22 PM         Medications Reviewed:     Current Facility-Administered Medications   Medication Dose Route Frequency    albumin human 25% (BUMINATE) solution 25 g  25 g IntraVENous Q12H    nystatin (MYCOSTATIN) 100,000 unit/mL oral suspension 500,000 Units  500,000 Units Oral QID    [Held by provider] sodium bicarbonate (8.4%) 150 mEq in dextrose 5% 1,000 mL infusion   IntraVENous CONTINUOUS    lactulose (CHRONULAC) 10 gram/15 mL solution 30 mL  20 g Oral DAILY    midodrine (PROAMATINE) tablet 15 mg  15 mg Oral Q8H    octreotide (SANDOSTATIN) 500 mcg in 0.9% sodium chloride 500 mL infusion  50 mcg/hr IntraVENous CONTINUOUS    sodium bicarbonate tablet 650 mg  650 mg Oral BID    0.9% sodium chloride infusion 250 mL  250 mL IntraVENous PRN    [Held by provider] oxyCODONE IR (ROXICODONE) tablet 5 mg  5 mg Oral Q4H PRN    [Held by provider] cefTRIAXone (ROCEPHIN) 2 g in 0.9% sodium chloride 20 mL IV syringe  2 g IntraVENous Q24H    ARIPiprazole (ABILIFY) tablet 2 mg  2 mg Oral DAILY    buPROPion SR (WELLBUTRIN SR) tablet 150 mg  150 mg Oral DAILY    [Held by provider] LORazepam (ATIVAN) tablet 1 mg  1 mg Oral BID PRN    pantoprazole (PROTONIX) 40 mg in 0.9% sodium chloride 10 mL injection  40 mg IntraVENous ACB    sodium chloride (NS) flush 5-40 mL  5-40 mL IntraVENous Q8H    sodium chloride (NS) flush 5-40 mL  5-40 mL IntraVENous PRN    acetaminophen (TYLENOL) tablet 650 mg  650 mg Oral Q6H PRN    Or    acetaminophen (TYLENOL) suppository 650 mg  650 mg Rectal Q6H PRN    polyethylene glycol (MIRALAX) packet 17 g  17 g Oral DAILY PRN    ondansetron (ZOFRAN ODT) tablet 4 mg  4 mg Oral Q8H PRN    Or    ondansetron (ZOFRAN) injection 4 mg  4 mg IntraVENous Q6H PRN    heparin (porcine) injection 5,000 Units  5,000 Units SubCUTAneous Q8H    L.acidophilus-paracasei-S.thermophil-bifidobacter (RISAQUAD) 8 billion cell capsule  1 Capsule Oral DAILY    nicotine (NICODERM CQ) 21 mg/24 hr patch 1 Patch  1 Patch TransDERmal DAILY     ______________________________________________________________________  EXPECTED LENGTH OF STAY: 4d 16h  ACTUAL LENGTH OF STAY:          9                 Christian Conrad MD

## 2022-09-09 NOTE — PROGRESS NOTES
Problem: Mobility Impaired (Adult and Pediatric)  Goal: *Acute Goals and Plan of Care (Insert Text)  Description: FUNCTIONAL STATUS PRIOR TO ADMISSION: Patient was modified independent using a rollator for functional mobility per patient, however patient is questionable historian. Patient notably with gross muscle wasting in LEs and UEs. HOME SUPPORT PRIOR TO ADMISSION: The patient lived alone with no local support. Physical Therapy Goals  Updated 9/8/2022  1. Patient will move from supine to sit and sit to supine  in bed with modified independence within 7 day(s). 2.  Patient will transfer from bed to chair and chair to bed with modified independence using the least restrictive device within 7 day(s). 3.  Patient will perform sit to stand with modified independence within 7 day(s). 4.  Patient will ambulate with modified independence for 30 feet with the least restrictive device within 7 day(s). 5.  Patient will ascend/descend 3 stairs with bilateral handrail(s) with modified independence within 7 day(s). Physical Therapy Goals  Initiated 9/1/2022  1. Patient will move from supine to sit and sit to supine  in bed with modified independence within 7 day(s). 2.  Patient will transfer from bed to chair and chair to bed with modified independence using the least restrictive device within 7 day(s). 3.  Patient will perform sit to stand with modified independence within 7 day(s). 4.  Patient will ambulate with modified independence for 50 feet with the least restrictive device within 7 day(s). 5.  Patient will ascend/descend 3 stairs with bilateral handrail(s) with modified independence within 7 day(s).     Outcome: Progressing Towards Goal     PHYSICAL THERAPY TREATMENT  Patient: Phyllis Shaw (42 y.o. female)  Date: 9/9/2022  Diagnosis: Alcoholic cirrhosis of liver with ascites (HCC) [O67.73] Alcoholic cirrhosis of liver with ascites ([facility] [address]      Precautions: Fall  Chart, physical therapy assessment, plan of care and goals were reviewed. ASSESSMENT  Patient continues with skilled PT services and is progressing towards goals. Patient received in bed agreeable to treatment. Noted to have distended abdomen that patient endorses is larger than usual. Patient demonstrates increased need for assistance for all mobility today including mod to max A x1 for bed mobility. Transfers required mod to max A. Almost constant VC requiring for SPT from bed to chair including assistance in movement of AD. Patient assisted in getting comfortable in chair with lumbar spine roll and pillows behind head. Vitals stable throughout. All needs met. RN notified of abdominal distention and discomfort noted. Will continue to follow, will recommend SNF at discharge. Current Level of Function Impacting Discharge (mobility/balance): mod A for rolling, max A for supine to sit, max A for transfers, constant support in standing balance    Other factors to consider for discharge: higher PLOF         PLAN :  Patient continues to benefit from skilled intervention to address the above impairments. Continue treatment per established plan of care. to address goals. Recommendation for discharge: (in order for the patient to meet his/her long term goals)  Therapy up to 5 days/week in SNF setting    This discharge recommendation:  Has been made in collaboration with the attending provider and/or case management    IF patient discharges home will need the following DME: to be determined (TBD)       SUBJECTIVE:   Patient stated I know I need to get out of this bed.     OBJECTIVE DATA SUMMARY:   Critical Behavior:  Neurologic State: Alert, Confused (period of confusion and forgetfullness)  Orientation Level: Oriented X4  Cognition: Follows commands, Other (comment) (slow responses at times/intermittent confusion)  Safety/Judgement: Decreased insight into deficits, Fall prevention  Functional Mobility Training:  Bed Mobility:  Rolling: Moderate assistance;Assist x1  Supine to Sit: Maximum assistance;Assist x1     Scooting: Maximum assistance;Assist x1    Transfers:  Sit to Stand: Moderate assistance;Assist x1  Stand to Sit: Moderate assistance;Assist x1  Stand Pivot Transfers: Moderate assistance;Maximum assistance;Assist x1        Balance:  Sitting: Impaired; Without support  Sitting - Static: Fair (occasional)  Sitting - Dynamic: Poor (constant support)  Standing: Impaired; With support  Standing - Static: Constant support;Fair;Occasional  Standing - Dynamic : Constant support;Poor      Pain Rating:  Discomfort in abdomen but no pain reported     Activity Tolerance:   Fair and requires frequent rest breaks    After treatment patient left in no apparent distress:   Sitting in chair, Call bell within reach, and Bed / chair alarm activated    COMMUNICATION/COLLABORATION:   The patients plan of care was discussed with: Registered nurse.      Rebeca Alicea PT   Time Calculation: 23 mins

## 2022-09-09 NOTE — PROGRESS NOTES
Occupational Therapy    Chart reviewed, rapid response just called for pt, will defer at this time and follow up as appropriate. LESLIE Traore    Regarding student involvement in patient care:  A student participated in this treatment session. Per CMS Medicare statements and AOTA guidelines I certify that the following was true:  1. I was present and directly observed the entire session. 2. I made all skilled judgments and clinical decisions regarding care. 3. I am the practitioner responsible for assessment, treatment, and documentation.

## 2022-09-09 NOTE — PROGRESS NOTES
Infectious Disease Progress Note      HPI:  Ms Dejesus seen  Somewhat intermittently confused       Physical Exam:    Vitals: Patient Vitals for the past 24 hrs:   Temp Pulse Resp BP SpO2   09/09/22 1200 -- 68 -- -- --   09/09/22 1113 98.8 °F (37.1 °C) 71 17 (!) 96/44 --   09/09/22 1000 -- 74 -- -- --   09/09/22 0725 98.7 °F (37.1 °C) 74 18 (!) 96/42 97 %   09/09/22 0604 -- 75 -- -- --   09/09/22 0400 -- 73 -- -- --   09/09/22 0310 99.7 °F (37.6 °C) 76 16 (!) 93/40 96 %   09/09/22 0203 -- 75 -- -- --   09/08/22 2342 98 °F (36.7 °C) 77 18 (!) 97/40 98 %   09/08/22 2207 -- 77 -- -- --   09/08/22 2001 -- 77 -- -- --   09/08/22 1903 97.6 °F (36.4 °C) 74 22 (!) 96/33 99 %   09/08/22 1800 -- 77 -- -- --   09/08/22 1500 98.1 °F (36.7 °C) 79 16 (!) 98/47 97 %   09/08/22 1400 -- 70 -- -- --     GEN: NAD, jaundice   HEENT:+ scleral icterus,   no thrush  CV: S1, S2 heard regularly,   Lungs: Clear to auscultation bilaterally  Abdomen: soft, distended, non tender , drain   Neuro: Alert, oriented to self, place verbal  Skin: no rash  Psych: flat affect   MSK no erythema of knees, ankles, wrists       Labs:   Recent Results (from the past 24 hour(s))   RENAL FUNCTION PANEL    Collection Time: 09/09/22  2:19 AM   Result Value Ref Range    Sodium 136 136 - 145 mmol/L    Potassium 3.5 3.5 - 5.1 mmol/L    Chloride 98 97 - 108 mmol/L    CO2 30 21 - 32 mmol/L    Anion gap 8 5 - 15 mmol/L    Glucose 105 (H) 65 - 100 mg/dL    BUN 64 (H) 6 - 20 MG/DL    Creatinine 2.79 (H) 0.55 - 1.02 MG/DL    BUN/Creatinine ratio 23 (H) 12 - 20      GFR est AA 21 (L) >60 ml/min/1.73m2    GFR est non-AA 17 (L) >60 ml/min/1.73m2    Calcium 8.5 8.5 - 10.1 MG/DL    Phosphorus 3.1 2.6 - 4.7 MG/DL    Albumin 3.7 3.5 - 5.0 g/dL       Microbiology Data:      CULTURE, BODY FLUID Hai Roman  Order: [id number]  Collected 8/31/2022 15:35    Status: Final result    Specimen Information: Abdominal Fluid;  Body Fluid        0 Result Notes  Component Ref Range & Units 8/31/22 1535    Special Requests:   NO SPECIAL REQUESTS    GRAM STAIN   RARE WBCS SEEN    GRAM STAIN   NO ORGANISMS SEEN    Culture result:   NO GROWTH 4 DAYS         Date: 9/4/2022 Department: [place] [id number] [facility] Released By/Authorizing: Milad Zazueta MD (auto-released)     CULTURE, BLOOD, PAIRED  Order: [id number]  Collected 9/4/2022 14:21    Status: Preliminary result    Specimen Information: Blood        0 Result Notes  Component Ref Range & Units 9/4/22 1421    Special Requests:   NO SPECIAL REQUESTS P    Culture result:   NO GROWTH 4 DAYS P         Pathology Results:    Peritoneal Fluid, ThinPrep and cell block:   No cells diagnostic for malignancy     Imaging:     CT ABD PELV WO CONT: Patient Communication     Add Comments   Not seen     Study Result    Narrative & Impression   EXAM: CT CHEST WO CONT, CT ABD PELV WO CONT     INDICATION: Leukocytosis, evaluate for pneumonia     COMPARISON: None     TECHNIQUE:   Thin axial images were obtained through the chest, abdomen and pelvis, without  administration of IV or oral contrast. Coronal and sagittal reformats were  generated. CT dose reduction was achieved through use of a standardized protocol  tailored for this examination and automatic exposure control for dose  modulation. FINDINGS:      CHEST WALL: No mass or axillary lymphadenopathy. THYROID: No nodule. MEDIASTINUM: No mass or lymphadenopathy. LUCAS: No mass or lymphadenopathy. THORACIC AORTA: No dissection or aneurysm. MAIN PULMONARY ARTERY: Normal in caliber. TRACHEA/BRONCHI: Patent. ESOPHAGUS: No wall thickening or dilatation. HEART: Top normal heart size with small pericardial fluid. PLEURA: Trace pleural fluid bilaterally. No pneumothorax. LUNGS: No suspicious pulmonary nodule, focal consolidations, or masses. LIVER: Cirrhotic liver. No obvious mass, within the limitation of lack of IV  contrast.  BILIARY TREE: Distended gallbladder. CBD is not dilated.   SPLEEN: within normal limits. PANCREAS: No mass or ductal dilatation. ADRENALS: Unremarkable. KIDNEYS: No mass, calculus, or hydronephrosis. STOMACH: Unremarkable. SMALL BOWEL: There is wall thickening involving proximal small bowel loops,  likely secondary to cirrhosis. COLON: There is wall thickening involving cecum/ascending colon. APPENDIX: Not visualized. PERITONEUM: Large volume ascites. No pneumoperitoneum. RETROPERITONEUM: No abdominal aortic aneurysm. Small prominent retroperitoneal  lymph nodes are likely reactive. REPRODUCTIVE ORGANS: Unremarkable. URINARY BLADDER: No mass or calculus. BONES: Right shoulder total arthroplasty. Orthopedic hardware involving  bilateral proximal femurs. ABDOMINAL WALL: Right inguinal hernia containing small volume ascites. ADDITIONAL COMMENTS: N/A     IMPRESSION  1. No evidence of pneumonia. 2.  Wall thickening involving the right colon, suggestive of  infectious/inflammatory colitis. 3.  Cirrhosis and large volume ascites. 4.  Distended gallbladder, commonly seen in the setting of prolonged fasting. Correlate with physical examination. Procedures:   8/31/22 US guided paracentesis with drainage catheter     Assessment / Plan:       Ms Ki Ferrell is a 64-year-old lady with hypertension, severe alcoholic hepatitis/cirrhosis who was transferred from [facility] for liver disease. ID consulted for leukocytosis    1) leukocytosis    Persistent and was worse 8/29/22 at [address] [place] [address] for superimposed infection but no clear evidence of infection  at present   Blood cx negative this admission   No clinical or radiographic evidence of pneumonia   Ascitic fluid and wbc not suggestive of SBP  Has been on empiric ceftriaxone earlier in admission    CT with wall thickening involving the right colon. This was also present on CT in August at outside hospital.   Patient has had diarrhea but also received lactulose for liver disease  At risk for C. Difficile.   If there is clinical worsening, worsening diarrhea /abdominal pain, consider but doubt clinically at present as patient says she feels better overall   Follow up smear results   Can see leukocytosis from a stress-induced reaction as well  Will sign off  Please reconsult if need be     2) alcoholic cirrhosis  3) HTN  4) RANDY        Thank for the opportunity to participate in the care of this patient. Please contact with questions or concerns.        Debbie Zurtia,   1:52 PM

## 2022-09-09 NOTE — PROGRESS NOTES
Charting and patient care of [address] 64 by Damon Russell, student nurse from 1993 to 0730 was supervised and reviewed by this RN.

## 2022-09-09 NOTE — PROGRESS NOTES
[facility], MD, FACP, Ayesha Estelle Fernandez, [place]      EDITH Price, [facility]     Argentina Sparks, [facility]   GAYATRI Godwin-WARREN Bain, [facility]       Eleonora Edgar [facility] [address]    at [address], [facility] Remington Watson  22.    [phone]    FAX: [address] [place] [address] Drive, [address], [address]    [phone]    FAX: [phone]       HEPATOLOGY PROGRESS NOTE  The patient is a 65 y.o.  female with a multi-decade history of consuming 1 bottle of wine per night. She was told by per medical provider in the past to cut back on alcohol use. She developed jaundice and scleral icterus 12 days ago. She has developed weakness and is unable to walk. She lives alone and has no family in the area. This is her first ever hospitalization for alcohol related illness. In the ED Laboratory studies were significant for:    WBC 29.9, HB 9.8 gms, , Sna 129, Scr 4.3 mg, AST 91, ALT 35, , TBILI 29.7 mg, INR 1.8, Sammonia 11,     Imaging of the liver with CT scan demonstrated cirrhosis with a large volume of ascites. I spoke with sister who is in [facility] and plans to visit next week. 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 She is too muscle wasted and has RANDY  Paracentesis catheter placed. Drainage down to about 500 cc per day. Scr is coming down from 4 to 2 mg. Today up to 2.7 mg.    U/O remains good     Feeding tube was placed  and she was getting tube feedings.   She removed the feeding tube and said she can eat on her own. She is more alert today and was eating some dinner this evening. Getting PT and is up in chair every day. Hepatology Plan:  I have discussed the role of tube feedings with her gain. She does not want feeding tube. She understands the importance of nutrition     All ascites has resolved and now that SCr coming down and marking urine can remove paracentesis catheter. Position her with puncture side facing up for 12 hours so site closes and does not leak  Needs repeat labs so we can see if INR and TBILI coming improving. Serum Albumin is now in normal range. Will stop IV albumin to avoid getting fluid overloaded and pulmonry edema. ASSESSMENT AND PLAN:  Cirrhosis  The diagnosis of cirrhosis is based upon imaging, laboratory studies, complications of cirrhosis. Cirrhosis is secondary to alcohol. The CTP is 10. Child class C. The MELD score is 39. Based upon the MELD and CTP scores the patient has a mortality of about 50-70% within the next 90 days   She is not a candidate for LT at this time because of severe malnutrition and advanced muscle wasting. Alcohol liver disease  Suspect the patient has alcohol induced liver disease based upon a history of consuming significant alcohol on a daily basis for many years, pattern of AST>ALT,   Serologies for other causes of chronic liver disease need to be obtained at some point    Ascites   Ascites developed for the first time in 8/2022. Paracentesis catheter placed and >5L ascites drained so far. Acute kidney injury  The patient has developed acute kidney injury with SCr 4 mg on admission. After several days of IV albumin, octreotide and midodrine SCr started to come down. Scr was down to 2.3 mg but now up to 2.7 mg  Continue IV octreotide and midodrine    Screening for Esophageal varices   The patient has not had an EGD to screen for varices.   Will perform EGD at some point during the hospitalization. Not bleeding. Not top priority. Risk of sedation given severe malnutrition and muscle wasting is considerable. Hepatic encephalopathy   Overt HE has not developed to date. There is no reason for treatment with lactulose of xifaxan  There is no reason to give her diarrhea. There is no need to restrict dietary protein at this time. Coagulopathy  This is secondary to cirrhosis, malnutrition,   Will treat with 3 doses of Vitamin K over 3 days. There is no evidence of bleeding. No need for FFP at this time. Malnutrition/muscle wasting  The patient has severe protein-calorie malnutrition with severe muscle wasting of the upper extremities, lower extremities, facial muscles  Malnutrition and muscle wasting is due to Alcohol abuse and poor caloric intake, cirrhosis and poor caloric utilization, refractory ascites and inability to eat sufficient calories,     The patient needs tube feedings. It is impossible for her to get any significant amount of calories by eating   She pulled out feeding tube and refuses to have this replaced. She clearly understands importance of nutritional support      PHYSICAL EXAMINATION:  VS: per nursing note  General:  Ill appearing  Eyes:  Sclera deeply icteric  ENT:  No oral lesions. Thyroid normal.  Nodes:  No adenopathy. Skin:  Numerous spider angiomata. Jaundice. Respiratory:  Lungs clear to auscultation. Cardiovascular:  Regular heart rate. Abdomen:  Distended with obvious ascites. Extremities:  No lower extremity edema. Severe muscle wasting. Neurologic:  Alert and oriented. Cranial nerves grossly intact. No asterixis.     LABORATORY:   Latest Reference Range & Units 9/5/22 05:01 9/6/22 06:53 9/6/22 09:15 9/7/22 00:04 9/8/22 06:03 9/9/22 02:19   WBC 3.6 - 11.0 K/uL 22.2 (H)  22.8 (H) 26.2 (H)     HGB 11.5 - 16.0 g/dL 10.1 (L)  8.7 (L) 8.7 (L)     PLATELET 922 - 844 K/uL 106 (L)  89 (L) 86 (L)     Sodium 136 - 145 mmol/L 138 140 140 139 137 136   Potassium 3.5 - 5.1 mmol/L 3.5 2.2 (LL) 2.6 (LL) 3.4 (L) 3.7 3.5   Chloride 97 - 108 mmol/L 108 96 (L) 102 98 98 98   CO2 21 - 32 mmol/L 18 (L) 38 (H) 29 31 28 30   Glucose 65 - 100 mg/dL 127 (H) 533 (H) 132 (H) 113 (H) 92 105 (H)   BUN 6 - 20 MG/DL 62 (H) 46 (H) 54 (H) 53 (H) 57 (H) 64 (H)   Creatinine 0.55 - 1.02 MG/DL 3.16 (H) 2.12 (H) 2.51 (H) 2.39 (H) 2.07 (H) 2.79 (H)   Bilirubin, total 0.2 - 1.0 MG/DL 19.3 (H)        Albumin 3.5 - 5.0 g/dL 4.2  4.1 3.0 (L)  3.6 3.5 3.7   ALT 12 - 78 U/L 14        AST 15 - 37 U/L 45 (H)        Alk.  phosphatase 45 - 117 U/L 100        (LL): Data is critically low  (H): Data is abnormally high  (L): Data is abnormally low      Syl Sprague MD  [facility] [address], [address] [place] [address] 22.  [phone]  [address] [place] [address]

## 2022-09-09 NOTE — PROGRESS NOTES
MELINA: anticipate d/c to LTC; mass referrals pending in careport in the [place], [place] area; Pt has no SNF benefit, will need U[employer] screening, Cm requested this completed by     Will need a Rapid Covid test    [facility] transport    Primary family contact: Sister Hayley Stevens  [phone]    RUR: 18%  -ETOH cirrhosis   -ID following  -malnutrition, DH out  -Nephrology following  -Hepatology following  -pt/ot recs. SNF  -1330-CM reviewed pt chart & discussed pt case in rounds. As per Attending, pt would be appropriate for SNF. CM spoke with sister, Ruddy Robertson and she is agreeable to LTC, mass referrals sent as noted above. CM to follow.   Eloy Wan RN BSN CCM

## 2022-09-10 LAB
ALBUMIN SERPL-MCNC: 3.4 G/DL (ref 3.5–5)
ALBUMIN SERPL-MCNC: 3.4 G/DL (ref 3.5–5)
ALBUMIN/GLOB SERPL: 3.1 {RATIO} (ref 1.1–2.2)
ALP SERPL-CCNC: 121 U/L (ref 45–117)
ALT SERPL-CCNC: 14 U/L (ref 12–78)
ANION GAP SERPL CALC-SCNC: 12 MMOL/L (ref 5–15)
APTT PPP: 95 SEC (ref 22.1–31)
AST SERPL-CCNC: 47 U/L (ref 15–37)
BILIRUB DIRECT SERPL-MCNC: 11.2 MG/DL (ref 0–0.2)
BILIRUB SERPL-MCNC: 17.3 MG/DL (ref 0.2–1)
BUN SERPL-MCNC: 63 MG/DL (ref 6–20)
BUN/CREAT SERPL: 25 (ref 12–20)
CALCIUM SERPL-MCNC: 8.4 MG/DL (ref 8.5–10.1)
CHLORIDE SERPL-SCNC: 100 MMOL/L (ref 97–108)
CO2 SERPL-SCNC: 25 MMOL/L (ref 21–32)
CREAT SERPL-MCNC: 2.5 MG/DL (ref 0.55–1.02)
GLOBULIN SER CALC-MCNC: 1.1 G/DL (ref 2–4)
GLUCOSE BLD STRIP.AUTO-MCNC: 121 MG/DL (ref 65–117)
GLUCOSE BLD STRIP.AUTO-MCNC: 128 MG/DL (ref 65–117)
GLUCOSE SERPL-MCNC: 77 MG/DL (ref 65–100)
PHOSPHATE SERPL-MCNC: 3.4 MG/DL (ref 2.6–4.7)
POTASSIUM SERPL-SCNC: 3.2 MMOL/L (ref 3.5–5.1)
PROT SERPL-MCNC: 4.5 G/DL (ref 6.4–8.2)
SERVICE CMNT-IMP: ABNORMAL
SERVICE CMNT-IMP: ABNORMAL
SODIUM SERPL-SCNC: 137 MMOL/L (ref 136–145)
THERAPEUTIC RANGE,PTTT: ABNORMAL SECS (ref 58–77)

## 2022-09-10 PROCEDURE — 74011250636 HC RX REV CODE- 250/636: Performed by: INTERNAL MEDICINE

## 2022-09-10 PROCEDURE — 74011250637 HC RX REV CODE- 250/637: Performed by: INTERNAL MEDICINE

## 2022-09-10 PROCEDURE — 36415 COLL VENOUS BLD VENIPUNCTURE: CPT

## 2022-09-10 PROCEDURE — 85730 THROMBOPLASTIN TIME PARTIAL: CPT

## 2022-09-10 PROCEDURE — 82962 GLUCOSE BLOOD TEST: CPT

## 2022-09-10 PROCEDURE — 74011000250 HC RX REV CODE- 250: Performed by: INTERNAL MEDICINE

## 2022-09-10 PROCEDURE — 94760 N-INVAS EAR/PLS OXIMETRY 1: CPT

## 2022-09-10 PROCEDURE — 74011636637 HC RX REV CODE- 636/637: Performed by: INTERNAL MEDICINE

## 2022-09-10 PROCEDURE — 74011250637 HC RX REV CODE- 250/637: Performed by: HOSPITALIST

## 2022-09-10 PROCEDURE — 80069 RENAL FUNCTION PANEL: CPT

## 2022-09-10 PROCEDURE — C9113 INJ PANTOPRAZOLE SODIUM, VIA: HCPCS | Performed by: INTERNAL MEDICINE

## 2022-09-10 PROCEDURE — 65660000001 HC RM ICU INTERMED STEPDOWN

## 2022-09-10 PROCEDURE — 80076 HEPATIC FUNCTION PANEL: CPT

## 2022-09-10 RX ORDER — LANOLIN ALCOHOL/MO/W.PET/CERES
400 CREAM (GRAM) TOPICAL DAILY
Status: DISCONTINUED | OUTPATIENT
Start: 2022-09-10 | End: 2022-09-15 | Stop reason: HOSPADM

## 2022-09-10 RX ORDER — POTASSIUM CHLORIDE 750 MG/1
10 TABLET, FILM COATED, EXTENDED RELEASE ORAL 2 TIMES DAILY
Status: DISCONTINUED | OUTPATIENT
Start: 2022-09-10 | End: 2022-09-15 | Stop reason: HOSPADM

## 2022-09-10 RX ADMIN — HEPARIN SODIUM 5000 UNITS: 5000 INJECTION INTRAVENOUS; SUBCUTANEOUS at 17:10

## 2022-09-10 RX ADMIN — Medication 400 MG: at 13:32

## 2022-09-10 RX ADMIN — MIDODRINE HYDROCHLORIDE 15 MG: 5 TABLET ORAL at 07:21

## 2022-09-10 RX ADMIN — NYSTATIN 500000 UNITS: 100000 SUSPENSION ORAL at 13:32

## 2022-09-10 RX ADMIN — BUPROPION HYDROCHLORIDE 150 MG: 150 TABLET, EXTENDED RELEASE ORAL at 10:37

## 2022-09-10 RX ADMIN — SODIUM BICARBONATE 650 MG: 650 TABLET ORAL at 17:11

## 2022-09-10 RX ADMIN — SODIUM CHLORIDE, PRESERVATIVE FREE 10 ML: 5 INJECTION INTRAVENOUS at 07:21

## 2022-09-10 RX ADMIN — MIDODRINE HYDROCHLORIDE 15 MG: 5 TABLET ORAL at 13:32

## 2022-09-10 RX ADMIN — OCTREOTIDE ACETATE 50 MCG/HR: 500 INJECTION, SOLUTION INTRAVENOUS; SUBCUTANEOUS at 21:11

## 2022-09-10 RX ADMIN — POTASSIUM CHLORIDE 10 MEQ: 750 TABLET, FILM COATED, EXTENDED RELEASE ORAL at 13:32

## 2022-09-10 RX ADMIN — Medication 1 CAPSULE: at 10:37

## 2022-09-10 RX ADMIN — OCTREOTIDE ACETATE 50 MCG/HR: 500 INJECTION, SOLUTION INTRAVENOUS; SUBCUTANEOUS at 08:40

## 2022-09-10 RX ADMIN — ARIPIPRAZOLE 2 MG: 2 TABLET ORAL at 10:36

## 2022-09-10 RX ADMIN — SODIUM CHLORIDE, PRESERVATIVE FREE 10 ML: 5 INJECTION INTRAVENOUS at 20:46

## 2022-09-10 RX ADMIN — SODIUM BICARBONATE 650 MG: 650 TABLET ORAL at 10:37

## 2022-09-10 RX ADMIN — POTASSIUM CHLORIDE 10 MEQ: 750 TABLET, FILM COATED, EXTENDED RELEASE ORAL at 17:11

## 2022-09-10 RX ADMIN — MIDODRINE HYDROCHLORIDE 15 MG: 5 TABLET ORAL at 20:45

## 2022-09-10 RX ADMIN — NYSTATIN 500000 UNITS: 100000 SUSPENSION ORAL at 20:45

## 2022-09-10 RX ADMIN — PANTOPRAZOLE SODIUM 40 MG: 40 INJECTION, POWDER, FOR SOLUTION INTRAVENOUS at 07:21

## 2022-09-10 RX ADMIN — NYSTATIN 500000 UNITS: 100000 SUSPENSION ORAL at 09:00

## 2022-09-10 RX ADMIN — OCTREOTIDE ACETATE 50 MCG/HR: 500 INJECTION, SOLUTION INTRAVENOUS; SUBCUTANEOUS at 08:46

## 2022-09-10 RX ADMIN — NYSTATIN 500000 UNITS: 100000 SUSPENSION ORAL at 17:10

## 2022-09-10 RX ADMIN — HEPARIN SODIUM 5000 UNITS: 5000 INJECTION INTRAVENOUS; SUBCUTANEOUS at 00:00

## 2022-09-10 NOTE — PROGRESS NOTES
[address] [facility] Adult  Hospitalist Group                                                                                          Hospitalist Progress Note  Rene Das MD  Answering service: [phone] or [phone] from in house phone        Date of Service:  9/10/2022  NAME:  Duncan Cline  :  1960  MRN:  [id number]      Admission Summary:   Per H&P, This is a 71-year-old woman with a past medical history significant for hypertension, alcoholic liver cirrhosis, who presented at the emergency room with abdominal pain. The patient presented at [facility] Emergency Room. When the patient arrived at the emergency room, the patient was found to have decompensation of liver cirrhosis. The hospitalist service at [facility] was asked to directly admit the patient to [facility], so that the patient can be seen by the hepatologist.  The patient came to the emergency room at [facility], because of lack of bed on the on the medical floor. When the patient arrived at the emergency room, the patient's lab work shows significant abnormality. She was subsequently referred to the hospitalist service for admission. No record of prior admission to this hospital.  It was reported that the patient underwent abdominal paracentesis before coming to [facility].  The amount of fluid removed is not known. The patient's abdominal pain is diffuse in location, dull ache, constant, 7/10 in severity with no known aggravating or relieving factors associated with nausea, but no vomiting, no diarrhea, and no constipation. Interval history / Subjective:   Wants to advance diet, states she isn't eating much because she doesn't like the food. Denies pain, dyspnea, n/v/d.      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 Serum ammonia was mildly elevated. Likely developed HE. Will continue lactulose at low dose with goal 2 bm's daily. Severe protein calorie malnutrition  Muscle wasting, bitemporal wasting. Due to her disease process  DHT advanced to duodenum 9/1 and resumed TFs - FT came out overnight, could not be replaced. Became more alert after lactulose so now on diet but PO intake remains poor. Refusing TF placement now. Discussed at length with her to at least finish her Ensures otherwise the FT has to be reinserted else she won't survive due to malnutrition. Acute kidney injury  Suspected ATN, IVVD due to poor PO intake, hypotension, NSAID use. HRS on ddx. No improvement with IV albumin but continue the same. IVF resumed 9/4 due to NGT being out. D/C bicarb drip today as she is developing an alkalosis. Renal function now improving. Hypotension  Continue midodrine, IV albumin (now off) and IV fluids as above    Anemia  Iron studies consistent with chronic disease  Transfuse PRN - unit ordered for 9/2  Vitamin K per hepatology for coagulopathy    Persistent leukocytosis: unclear etiology, no evidence of infection clinically. Afebrile. Has been on empiric IV ceftriaxone for 6 days, stopped 9/4. Sent UA and blood cultures. UA with yeast and bacteria, minimal pyuria, unlikely source of leukocytosis. Removed paredes 9/6. Follow-up blood cultures. Peritoneal fluid cell counts now c/w peritonitis. Consider chest/abd CT but utility is low given she can't get contrast due to renal failure, and clinically does not appear infected.     History of HTN    Labs off today - repeat before basic any treatments on these    Code status: DNR - continue full current care otherwise but do not escalate (if pt rapidly deteriorates make comfort care and inform sister over the phone 24/7). See 9/2 PN for my discussion with her. Prophylaxis: SCD  Care Plan discussed with: RN     Hospital Problems  Date Reviewed: 8/30/2022            Codes Class Noted POA    Severe protein-calorie malnutrition ([facility] (Chronic) ICD-10-CM: C91  ICD-9-CM: 042  8/31/2022 Yes        * (Principal) Alcoholic cirrhosis of liver with ascites ([facility] ICD-10-CM: K70.31  ICD-9-CM: 571.2  8/30/2022 Yes       Review of Systems:   Review of systems not obtained due to patient factors. Vital Signs:    Last 24hrs VS reviewed since prior progress note. Most recent are:  Visit Vitals  BP (!) 99/53 (BP 1 Location: Left upper arm, BP Patient Position: At rest)   Pulse 67   Temp 98.6 °F (37 °C)   Resp 20   Ht 5' 3\" (1.6 m)   Wt 50.4 kg (111 lb 1.8 oz)   SpO2 97%   BMI 19.68 kg/m²     Patient Vitals for the past 24 hrs:   Temp Pulse Resp BP SpO2   09/09/22 2330 98.6 °F (37 °C) 67 20 (!) 99/53 97 %   09/09/22 2155 -- 69 -- -- --   09/09/22 2000 -- 69 -- -- --   09/09/22 1958 98.6 °F (37 °C) 63 21 (!) 92/41 98 %   09/09/22 1800 -- 63 -- -- --   09/09/22 1518 98.8 °F (37.1 °C) 66 20 (!) 90/45 99 %   09/09/22 1400 -- 73 -- -- --   09/09/22 1200 -- 68 -- -- --   09/09/22 1113 98.8 °F (37.1 °C) 71 17 (!) 96/44 --   09/09/22 1000 -- 74 -- -- --   09/09/22 0725 98.7 °F (37.1 °C) 74 18 (!) 96/42 97 %   09/09/22 0604 -- 75 -- -- --   09/09/22 0400 -- 73 -- -- --   09/09/22 0310 99.7 °F (37.6 °C) 76 16 (!) 93/40 96 %   09/09/22 0203 -- 75 -- -- --         Intake/Output Summary (Last 24 hours) at 9/10/2022 0013  Last data filed at 9/9/2022 1518  Gross per 24 hour   Intake 883.33 ml   Output 2880 ml   Net -1996.67 ml          Physical Examination:     I had a face to face encounter with this patient and independently examined them on 9/10/2022 as outlined below:          Constitutional:  NAD, cachectic, chronically-ill appearing   ENT:  Oral mucosa moist, Icteric sclerae    Resp:  CTA bilaterally.  No wheezing/rhonchi/rales. No accessory muscle use. CV:  Regular rhythm, normal rate, soft systolic murmur     GI: Soft, more distended today, NT, bs+    Musculoskeletal:  No edema, warm, 2+     Neurologic:  Grossly non-focal, alert currently but intermittently drowsy  Psych: not anxious nor agitated     Skin: jaundiced       Data Review:    Review and/or order of clinical lab test  Review and/or order of tests in the radiology section of [facility]      Labs:     No results for input(s): WBC, HGB, HCT, PLT, HGBEXT, HCTEXT, PLTEXT, HGBEXT, HCTEXT, PLTEXT in the last 72 hours. Recent Labs     09/09/22 0219 09/08/22  0603    137   K 3.5 3.7   CL 98 98   CO2 30 28   BUN 64* 57*   CREA 2.79* 2.07*   * 92   CA 8.5 8.7   MG  --  1.5*   PHOS 3.1 3.6       Recent Labs     09/09/22 0219 09/08/22  0603   ALB 3.7 3.5       No results for input(s): INR, PTP, APTT, INREXT, INREXT in the last 72 hours. No results for input(s): FE, TIBC, PSAT, FERR in the last 72 hours. Lab Results   Component Value Date/Time    Folate 14.7 08/30/2022 01:52 AM        No results for input(s): PH, PCO2, PO2 in the last 72 hours. No results for input(s): CPK, CKNDX, TROIQ in the last 72 hours.     No lab exists for component: CPKMB  No results found for: CHOL, CHOLX, CHLST, CHOLV, HDL, HDLP, LDL, LDLC, DLDLP, TGLX, TRIGL, TRIGP, CHHD, CHHDX  Lab Results   Component Value Date/Time    Glucose (POC) 145 (H) 09/06/2022 11:20 AM    Glucose (POC) 131 (H) 09/05/2022 11:55 PM    Glucose (POC) 171 (H) 09/03/2022 12:06 AM    Glucose (POC) 154 (H) 09/02/2022 01:43 PM    Glucose (POC) 113 09/02/2022 06:11 AM     Lab Results   Component Value Date/Time    Color DARK YELLOW 09/04/2022 05:22 PM    Appearance TURBID (A) 09/04/2022 05:22 PM    Specific gravity 1.017 09/04/2022 05:22 PM    pH (UA) 5.5 09/04/2022 05:22 PM    Protein 100 (A) 09/04/2022 05:22 PM    Glucose Negative 09/04/2022 05:22 PM    Ketone Negative 09/04/2022 05:22 PM Urobilinogen 1.0 09/04/2022 05:22 PM    Nitrites Positive (A) 09/04/2022 05:22 PM    Leukocyte Esterase LARGE (A) 09/04/2022 05:22 PM    Epithelial cells FEW 09/04/2022 05:22 PM    Bacteria 1+ (A) 09/04/2022 05:22 PM    WBC 5-10 09/04/2022 05:22 PM    RBC 5-10 09/04/2022 05:22 PM         Medications Reviewed:     Current Facility-Administered Medications   Medication Dose Route Frequency    albumin human 25% (BUMINATE) solution 25 g  25 g IntraVENous Q12H    nystatin (MYCOSTATIN) 100,000 unit/mL oral suspension 500,000 Units  500,000 Units Oral QID    [Held by provider] sodium bicarbonate (8.4%) 150 mEq in dextrose 5% 1,000 mL infusion   IntraVENous CONTINUOUS    lactulose (CHRONULAC) 10 gram/15 mL solution 30 mL  20 g Oral DAILY    midodrine (PROAMATINE) tablet 15 mg  15 mg Oral Q8H    octreotide (SANDOSTATIN) 500 mcg in 0.9% sodium chloride 500 mL infusion  50 mcg/hr IntraVENous CONTINUOUS    sodium bicarbonate tablet 650 mg  650 mg Oral BID    0.9% sodium chloride infusion 250 mL  250 mL IntraVENous PRN    [Held by provider] oxyCODONE IR (ROXICODONE) tablet 5 mg  5 mg Oral Q4H PRN    [Held by provider] cefTRIAXone (ROCEPHIN) 2 g in 0.9% sodium chloride 20 mL IV syringe  2 g IntraVENous Q24H    ARIPiprazole (ABILIFY) tablet 2 mg  2 mg Oral DAILY    buPROPion SR (WELLBUTRIN SR) tablet 150 mg  150 mg Oral DAILY    [Held by provider] LORazepam (ATIVAN) tablet 1 mg  1 mg Oral BID PRN    pantoprazole (PROTONIX) 40 mg in 0.9% sodium chloride 10 mL injection  40 mg IntraVENous ACB    sodium chloride (NS) flush 5-40 mL  5-40 mL IntraVENous Q8H    sodium chloride (NS) flush 5-40 mL  5-40 mL IntraVENous PRN    acetaminophen (TYLENOL) tablet 650 mg  650 mg Oral Q6H PRN    Or    acetaminophen (TYLENOL) suppository 650 mg  650 mg Rectal Q6H PRN    polyethylene glycol (MIRALAX) packet 17 g  17 g Oral DAILY PRN    ondansetron (ZOFRAN ODT) tablet 4 mg  4 mg Oral Q8H PRN    Or    ondansetron (ZOFRAN) injection 4 mg  4 mg IntraVENous Q6H PRN    heparin (porcine) injection 5,000 Units  5,000 Units SubCUTAneous Q8H    L.acidophilus-paracasei-S.thermophil-bifidobacter (RISAQUAD) 8 billion cell capsule  1 Capsule Oral DAILY    nicotine (NICODERM CQ) 21 mg/24 hr patch 1 Patch  1 Patch TransDERmal DAILY     ______________________________________________________________________  EXPECTED LENGTH OF STAY: 4d 16h  ACTUAL LENGTH OF STAY:          11                 Celine Green MD

## 2022-09-10 NOTE — PROGRESS NOTES
Problem: Falls - Risk of  Goal: *Absence of Falls  Description: Document Noel Mcdonald Fall Risk and appropriate interventions in the flowsheet. Outcome: Progressing Towards Goal  Note: Fall Risk Interventions:  Mobility Interventions: Bed/chair exit alarm, Communicate number of staff needed for ambulation/transfer, Patient to call before getting OOB    Mentation Interventions: Adequate sleep, hydration, pain control, Bed/chair exit alarm, Door open when patient unattended, More frequent rounding, Reorient patient    Medication Interventions: Assess postural VS orthostatic hypotension, Teach patient to arise slowly    Elimination Interventions: Call light in reach, Patient to call for help with toileting needs, Toileting schedule/hourly rounds    History of Falls Interventions: Bed/chair exit alarm         Problem: Pressure Injury - Risk of  Goal: *Prevention of pressure injury  Description: Document Chandana Scale and appropriate interventions in the flowsheet.   Outcome: Progressing Towards Goal  Note: Pressure Injury Interventions:  Sensory Interventions: Assess changes in LOC, Avoid rigorous massage over bony prominences, Check visual cues for pain, Keep linens dry and wrinkle-free, Minimize linen layers    Moisture Interventions: Absorbent underpads, Apply protective barrier, creams and emollients, Check for incontinence Q2 hours and as needed, Internal/External urinary devices, Minimize layers    Activity Interventions: Increase time out of bed, Pressure redistribution bed/mattress(bed type), PT/OT evaluation    Mobility Interventions: Pressure redistribution bed/mattress (bed type), PT/OT evaluation    Nutrition Interventions: Document food/fluid/supplement intake, Offer support with meals,snacks and hydration    Friction and Shear Interventions: Apply protective barrier, creams and emollients, Foam dressings/transparent film/skin sealants, Minimize layers                Problem: General Medical Care Plan  Goal: *Vital signs within specified parameters  Outcome: Progressing Towards Goal  Goal: *Optimal pain control at patient's stated goal  Outcome: Progressing Towards Goal  Goal: *Anxiety reduced or absent  Outcome: Progressing Towards Goal

## 2022-09-10 NOTE — PROGRESS NOTES
1930: Bedside and Verbal shift change report given to Rosa RN (oncoming nurse) by Eloy Maguire RN (offgoing nurse). Report included the following information SBAR, Kardex, Intake/Output, MAR, Recent Results, and Cardiac Rhythm SR .      0730: Bedside and Verbal shift change report given to Trell Bain RN (oncoming nurse) by Bernard Smith RN (offgoing nurse).  Report included the following information SBAR, Kardex, Intake/Output, MAR, Recent Results, and Cardiac Rhythm SR .

## 2022-09-10 NOTE — PROGRESS NOTES
Renal Progress Note    NAME:  Angelica Mukherjee   :   1960   MRN:   [id number]     Date/Time:  9/10/2022   Subjective:       , feels ok, no N/v/SOB. Worry about her pets   seen and examined, has no complaint, not oriented to place or time, had ascitis drain placed by IR, 3 lit drained sofar, 400 ml uo   more sleepy and less interactive, does not answer the questions. Oliguric, no significant change in cr. Possibly HRS. WBC still elevated. Has NGT on TF  9/3 awake, weak, denies pain, NGT is out and will be replaced. Had 600 ml urine/o, BP better, cr stable   more awake and alert, interactive today, wants to eat, , renal function improving, unable to place back the NGT, still significant leukocytosis. Denies abd pain, cough   awake and alert, denies pain, nausea, abd pain. On po diet now, refused TF. Renal function improving, borderline UO     sleeping, UO borderline 450ml, still draining>1.5 lit /day from ascitis, persistent leukocytosis, OFF Abx. Renal function improving slowly.  sitting in chair, feels ok, appetite better. Renal function improving   awake, denies complaints, low po intake,  ml, paredes has been removed.   Renal function improving    22 --> F/U RANDY  9-10-22 awake, eating lunch, no complaints, seems lucid      Medications reviewed:  Current Facility-Administered Medications   Medication Dose Route Frequency    nystatin (MYCOSTATIN) 100,000 unit/mL oral suspension 500,000 Units  500,000 Units Oral QID    [Held by provider] sodium bicarbonate (8.4%) 150 mEq in dextrose 5% 1,000 mL infusion   IntraVENous CONTINUOUS    lactulose (CHRONULAC) 10 gram/15 mL solution 30 mL  20 g Oral DAILY    midodrine (PROAMATINE) tablet 15 mg  15 mg Oral Q8H    octreotide (SANDOSTATIN) 500 mcg in 0.9% sodium chloride 500 mL infusion  50 mcg/hr IntraVENous CONTINUOUS    sodium bicarbonate tablet 650 mg  650 mg Oral BID    0.9% sodium chloride infusion 250 mL  250 mL IntraVENous PRN    [Held by provider] oxyCODONE IR (ROXICODONE) tablet 5 mg  5 mg Oral Q4H PRN    [Held by provider] cefTRIAXone (ROCEPHIN) 2 g in 0.9% sodium chloride 20 mL IV syringe  2 g IntraVENous Q24H    ARIPiprazole (ABILIFY) tablet 2 mg  2 mg Oral DAILY    buPROPion SR (WELLBUTRIN SR) tablet 150 mg  150 mg Oral DAILY    [Held by provider] LORazepam (ATIVAN) tablet 1 mg  1 mg Oral BID PRN    pantoprazole (PROTONIX) 40 mg in 0.9% sodium chloride 10 mL injection  40 mg IntraVENous ACB    sodium chloride (NS) flush 5-40 mL  5-40 mL IntraVENous Q8H    sodium chloride (NS) flush 5-40 mL  5-40 mL IntraVENous PRN    acetaminophen (TYLENOL) tablet 650 mg  650 mg Oral Q6H PRN    Or    acetaminophen (TYLENOL) suppository 650 mg  650 mg Rectal Q6H PRN    polyethylene glycol (MIRALAX) packet 17 g  17 g Oral DAILY PRN    ondansetron (ZOFRAN ODT) tablet 4 mg  4 mg Oral Q8H PRN    Or    ondansetron (ZOFRAN) injection 4 mg  4 mg IntraVENous Q6H PRN    heparin (porcine) injection 5,000 Units  5,000 Units SubCUTAneous Q8H    L.acidophilus-paracasei-S.thermophil-bifidobacter (RISAQUAD) 8 billion cell capsule  1 Capsule Oral DAILY    nicotine (NICODERM CQ) 21 mg/24 hr patch 1 Patch  1 Patch TransDERmal DAILY        Objective:   Vitals:  Visit Vitals  BP (!) 101/51 (BP 1 Location: Right upper arm, BP Patient Position: At rest)   Pulse 64   Temp 98.7 °F (37.1 °C)   Resp 12   Ht 5' 3\" (1.6 m)   Wt 50.4 kg (111 lb 1.8 oz)   SpO2 98%   BMI 19.68 kg/m²     Temp (24hrs), Av.6 °F (37 °C), Min:98.5 °F (36.9 °C), Max:98.8 °F (37.1 °C)      O2 Device: None (Room air)    Last 24hr Input/Output:    Intake/Output Summary (Last 24 hours) at 9/10/2022 1255  Last data filed at 9/10/2022 0330  Gross per 24 hour   Intake 1736.67 ml   Output 2450 ml   Net -713.33 ml          Physical Exam:    Seen in Room 464.     General: Chronically ill-looking lady , jaundiced, awake     HEENT:  sclerae icteric,    Neck: Supple    Lungs : Clear to auscultation, no wheezes, no rales    CVS: RRR, S1 S2 normal, No rub, no  LE edema    Abdomen: distended ++,   Ascites+,    Extremities: No edema    Skin: jaundice    Neurologic: Awake and interacting    Lab Data Reviewed:    Recent Results (from the past 24 hour(s))   RENAL FUNCTION PANEL    Collection Time: 09/10/22  4:27 AM   Result Value Ref Range    Sodium 137 136 - 145 mmol/L    Potassium 3.2 (L) 3.5 - 5.1 mmol/L    Chloride 100 97 - 108 mmol/L    CO2 25 21 - 32 mmol/L    Anion gap 12 5 - 15 mmol/L    Glucose 77 65 - 100 mg/dL    BUN 63 (H) 6 - 20 MG/DL    Creatinine 2.50 (H) 0.55 - 1.02 MG/DL    BUN/Creatinine ratio 25 (H) 12 - 20      GFR est AA 24 (L) >60 ml/min/1.73m2    GFR est non-AA 20 (L) >60 ml/min/1.73m2    Calcium 8.4 (L) 8.5 - 10.1 MG/DL    Phosphorus 3.4 2.6 - 4.7 MG/DL    Albumin 3.4 (L) 3.5 - 5.0 g/dL   PTT    Collection Time: 09/10/22  4:27 AM   Result Value Ref Range    aPTT 95.0 (HH) 22.1 - 31.0 sec    aPTT, therapeutic range     58.0 - 77.0 SECS   HEPATIC FUNCTION PANEL    Collection Time: 09/10/22  4:27 AM   Result Value Ref Range    Protein, total 4.5 (L) 6.4 - 8.2 g/dL    Albumin 3.4 (L) 3.5 - 5.0 g/dL    Globulin 1.1 (L) 2.0 - 4.0 g/dL    A-G Ratio 3.1 (H) 1.1 - 2.2      Bilirubin, total 17.3 (H) 0.2 - 1.0 MG/DL    Bilirubin, direct 11.2 (H) 0.0 - 0.2 MG/DL    Alk. phosphatase 121 (H) 45 - 117 U/L    AST (SGOT) 47 (H) 15 - 37 U/L    ALT (SGPT) 14 12 - 78 U/L   GLUCOSE, POC    Collection Time: 09/10/22 11:23 AM   Result Value Ref Range    Glucose (POC) 128 (H) 65 - 117 mg/dL    Performed by Herbert Das  PCT          Assessment/Plan:   Principal Problem:    Alcoholic cirrhosis of liver with ascites ([facility] (8/30/2022)    Active Problems:    Severe protein-calorie malnutrition ([facility] (8/31/2022)     Acute kidney injury -cr 0.4 in early August,   -RANDY/possible ATN due to intravascular volume depletion in setting of poor po intake, hypotension /third spacing and possible sepsis/NSAIDs. Hepatorenal in DDx .  Low FeNa-low urine output  Possible HRS. Not a candidate liver transplant Per Dr Spencer Canela. Not a candidate for RRT. -renal function improving  -c/w  po bicarb   -off tube feeding-nutritional support  -strict Is and Os  -avoid nephrotoxins, IV contrast/NSAIDs  -dose meds per GFR  -f/up  renal function  - has persistent leukocytosis and worsening, ID consulted  -c/w with IV albumin, change to q12hr, octreotide and midodrine       Hypotension-with h/o HTN on Coreg, due to effective intravascular volume depletion  -on prednisone per med list, but patient denies taking that.  -midodrine     Hyponatremia,resolved, -due to liver cirrhosis and volume depletion, chronic since early August  -improving with IVF  -Avoid rapid correction  -improved    Hypokalemia/hypophosphatemia, hypomagnesemia   -replete   -check Mg    Alcoholic liver cirrhosis-decompensated. -Hypoalbuminemia/ascitis/coagulopathy-nl ammonia  -hepatology following-not a candidate for liver transplant.  Poor prognosis  - on lactulose    Anemia  -high iron stores  -Hb has dropped, might be due to hydration, r/o bleed  -transfuse for Hb<7    Persistent Leukocytosis: off Rocephin-source of infection? -manegaement per primary team    Hypoglycemia, resolved    DNR    Poor prognosis    Give oral Mg and K  ___________________________________________________    Total time spent with patient:  [] 15   [] 25   [x] 35   []  __ minutes    [] Critical Care Provided    Care Plan discussed with        [x] Patient   [] Family    [] Care Manager   [] Consultant/Specialist :      []   >50% of visit spent in counseling and coordination of care   (Discussed [] CODE status,  [] Care Plan, [] D/C Planning)    ___________________________________________________    Attending Physician: MD Randall Garcia

## 2022-09-11 LAB
ALBUMIN SERPL-MCNC: 3.2 G/DL (ref 3.5–5)
ALBUMIN SERPL-MCNC: 3.3 G/DL (ref 3.5–5)
ALBUMIN/GLOB SERPL: 2.3 {RATIO} (ref 1.1–2.2)
ALP SERPL-CCNC: 154 U/L (ref 45–117)
ALT SERPL-CCNC: 15 U/L (ref 12–78)
ANION GAP SERPL CALC-SCNC: 13 MMOL/L (ref 5–15)
AST SERPL-CCNC: 57 U/L (ref 15–37)
BILIRUB DIRECT SERPL-MCNC: 12.6 MG/DL (ref 0–0.2)
BILIRUB SERPL-MCNC: 18.6 MG/DL (ref 0.2–1)
BUN SERPL-MCNC: 65 MG/DL (ref 6–20)
BUN/CREAT SERPL: 26 (ref 12–20)
CALCIUM SERPL-MCNC: 8.6 MG/DL (ref 8.5–10.1)
CHLORIDE SERPL-SCNC: 101 MMOL/L (ref 97–108)
CO2 SERPL-SCNC: 22 MMOL/L (ref 21–32)
CREAT SERPL-MCNC: 2.46 MG/DL (ref 0.55–1.02)
GLOBULIN SER CALC-MCNC: 1.4 G/DL (ref 2–4)
GLUCOSE SERPL-MCNC: 80 MG/DL (ref 65–100)
INR PPP: 1.9 (ref 0.9–1.1)
MAGNESIUM SERPL-MCNC: 1.8 MG/DL (ref 1.6–2.4)
PHOSPHATE SERPL-MCNC: 3.4 MG/DL (ref 2.6–4.7)
POTASSIUM SERPL-SCNC: 3.3 MMOL/L (ref 3.5–5.1)
PROT SERPL-MCNC: 4.6 G/DL (ref 6.4–8.2)
PROTHROMBIN TIME: 19.2 SEC (ref 9–11.1)
SODIUM SERPL-SCNC: 136 MMOL/L (ref 136–145)

## 2022-09-11 PROCEDURE — 74011250637 HC RX REV CODE- 250/637: Performed by: INTERNAL MEDICINE

## 2022-09-11 PROCEDURE — 65660000001 HC RM ICU INTERMED STEPDOWN

## 2022-09-11 PROCEDURE — 94760 N-INVAS EAR/PLS OXIMETRY 1: CPT

## 2022-09-11 PROCEDURE — C9113 INJ PANTOPRAZOLE SODIUM, VIA: HCPCS | Performed by: INTERNAL MEDICINE

## 2022-09-11 PROCEDURE — 36415 COLL VENOUS BLD VENIPUNCTURE: CPT

## 2022-09-11 PROCEDURE — 83735 ASSAY OF MAGNESIUM: CPT

## 2022-09-11 PROCEDURE — 74011000250 HC RX REV CODE- 250: Performed by: INTERNAL MEDICINE

## 2022-09-11 PROCEDURE — 74011250636 HC RX REV CODE- 250/636: Performed by: INTERNAL MEDICINE

## 2022-09-11 PROCEDURE — 74011250637 HC RX REV CODE- 250/637: Performed by: HOSPITALIST

## 2022-09-11 PROCEDURE — 74011636637 HC RX REV CODE- 636/637: Performed by: INTERNAL MEDICINE

## 2022-09-11 PROCEDURE — 80069 RENAL FUNCTION PANEL: CPT

## 2022-09-11 PROCEDURE — 80076 HEPATIC FUNCTION PANEL: CPT

## 2022-09-11 PROCEDURE — 85610 PROTHROMBIN TIME: CPT

## 2022-09-11 RX ORDER — ALBUMIN HUMAN 250 G/1000ML
12.5 SOLUTION INTRAVENOUS ONCE
Status: ACTIVE | OUTPATIENT
Start: 2022-09-11 | End: 2022-09-11

## 2022-09-11 RX ADMIN — SODIUM BICARBONATE 650 MG: 650 TABLET ORAL at 17:21

## 2022-09-11 RX ADMIN — POTASSIUM CHLORIDE 10 MEQ: 750 TABLET, FILM COATED, EXTENDED RELEASE ORAL at 17:21

## 2022-09-11 RX ADMIN — SODIUM CHLORIDE, PRESERVATIVE FREE 10 ML: 5 INJECTION INTRAVENOUS at 14:05

## 2022-09-11 RX ADMIN — NYSTATIN 500000 UNITS: 100000 SUSPENSION ORAL at 17:21

## 2022-09-11 RX ADMIN — OCTREOTIDE ACETATE 50 MCG/HR: 500 INJECTION, SOLUTION INTRAVENOUS; SUBCUTANEOUS at 08:56

## 2022-09-11 RX ADMIN — MIDODRINE HYDROCHLORIDE 15 MG: 5 TABLET ORAL at 23:45

## 2022-09-11 RX ADMIN — Medication 1 CAPSULE: at 08:13

## 2022-09-11 RX ADMIN — HEPARIN SODIUM 5000 UNITS: 5000 INJECTION INTRAVENOUS; SUBCUTANEOUS at 15:39

## 2022-09-11 RX ADMIN — MIDODRINE HYDROCHLORIDE 15 MG: 5 TABLET ORAL at 14:04

## 2022-09-11 RX ADMIN — LACTULOSE 30 ML: 20 SOLUTION ORAL at 08:13

## 2022-09-11 RX ADMIN — OCTREOTIDE ACETATE 50 MCG/HR: 500 INJECTION, SOLUTION INTRAVENOUS; SUBCUTANEOUS at 20:17

## 2022-09-11 RX ADMIN — BUPROPION HYDROCHLORIDE 150 MG: 150 TABLET, EXTENDED RELEASE ORAL at 08:13

## 2022-09-11 RX ADMIN — MIDODRINE HYDROCHLORIDE 15 MG: 5 TABLET ORAL at 07:08

## 2022-09-11 RX ADMIN — NYSTATIN 500000 UNITS: 100000 SUSPENSION ORAL at 14:04

## 2022-09-11 RX ADMIN — HEPARIN SODIUM 5000 UNITS: 5000 INJECTION INTRAVENOUS; SUBCUTANEOUS at 00:07

## 2022-09-11 RX ADMIN — POTASSIUM CHLORIDE 10 MEQ: 750 TABLET, FILM COATED, EXTENDED RELEASE ORAL at 08:13

## 2022-09-11 RX ADMIN — PANTOPRAZOLE SODIUM 40 MG: 40 INJECTION, POWDER, FOR SOLUTION INTRAVENOUS at 07:09

## 2022-09-11 RX ADMIN — NYSTATIN 500000 UNITS: 100000 SUSPENSION ORAL at 23:45

## 2022-09-11 RX ADMIN — HEPARIN SODIUM 5000 UNITS: 5000 INJECTION INTRAVENOUS; SUBCUTANEOUS at 23:45

## 2022-09-11 RX ADMIN — HEPARIN SODIUM 5000 UNITS: 5000 INJECTION INTRAVENOUS; SUBCUTANEOUS at 08:14

## 2022-09-11 RX ADMIN — Medication 400 MG: at 08:13

## 2022-09-11 RX ADMIN — NYSTATIN 500000 UNITS: 100000 SUSPENSION ORAL at 08:13

## 2022-09-11 RX ADMIN — SODIUM CHLORIDE, PRESERVATIVE FREE 10 ML: 5 INJECTION INTRAVENOUS at 23:45

## 2022-09-11 RX ADMIN — SODIUM BICARBONATE 650 MG: 650 TABLET ORAL at 08:13

## 2022-09-11 RX ADMIN — SODIUM CHLORIDE, PRESERVATIVE FREE 10 ML: 5 INJECTION INTRAVENOUS at 07:09

## 2022-09-11 RX ADMIN — ARIPIPRAZOLE 2 MG: 2 TABLET ORAL at 08:13

## 2022-09-11 NOTE — PROGRESS NOTES
1553 Oral temp- 96.1 F at 1130. Warm blanket placed on patient. During reassessment, patient oral temp was still 96.1 F- orders given by Dr Andrez Puckett to start patsy hugger. 1730 Oral temperature now 100.0 F. Patsy hugger turned off.

## 2022-09-11 NOTE — PROGRESS NOTES
Transition of Care    RUR: 14%    Chart was reviewed. No facility has accepted at this time. Will continue to follow for discharge planning.   Signed By: Margarito Sebastian LCSW     September 11, 2022

## 2022-09-11 NOTE — PROGRESS NOTES
[facility] Adult  Hospitalist Group                                                                                          Hospitalist Progress Note  Philly Olivares MD  Answering service: [phone] OR [phone] from in house phone        Date of Service:  9/10/2022  NAME:  Emilia Chávez  :  1960  MRN:  [id number]      Admission Summary:   Per H&P, This is a 71-year-old woman with a past medical history significant for hypertension, alcoholic liver cirrhosis, who presented at the emergency room with abdominal pain. The patient presented at [facility] Emergency Room. When the patient arrived at the emergency room, the patient was found to have decompensation of liver cirrhosis. The hospitalist service at [facility] was asked to directly admit the patient to [facility], so that the patient can be seen by the hepatologist.  The patient came to the emergency room at [facility], because of lack of bed on the on the medical floor. When the patient arrived at the emergency room, the patient's lab work shows significant abnormality. She was subsequently referred to the hospitalist service for admission. No record of prior admission to this hospital.  It was reported that the patient underwent abdominal paracentesis before coming to [facility].  The amount of fluid removed is not known. The patient's abdominal pain is diffuse in location, dull ache, constant, 7/10 in severity with no known aggravating or relieving factors associated with nausea, but no vomiting, no diarrhea, and no constipation. Interval history / Subjective:   Wants to advance diet, states she isn't eating much because she doesn't like the food. Denies pain, dyspnea, n/v/d.      Assessment & Plan:        Decompensated alcoholic cirrhosis of the liver  S/p therapeutic paracentesis  She did have EGD on  at 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 Serum ammonia was mildly elevated. Likely developed HE. Will continue lactulose at low dose with goal 2 bm's daily. Severe protein calorie malnutrition  Muscle wasting, bitemporal wasting. Due to her disease process  DHT advanced to duodenum 9/1 and resumed TFs - FT came out overnight, could not be replaced. Became more alert after lactulose so now on diet but PO intake remains poor. Refusing TF placement now. Discussed at length with her to at least finish her Ensures otherwise the FT has to be reinserted else she won't survive due to malnutrition. Acute kidney injury  Suspected ATN, IVVD due to poor PO intake, hypotension, NSAID use. HRS on ddx. No improvement with IV albumin but continue the same. IVF resumed 9/4 due to NGT being out. D/C bicarb drip today as she is developing an alkalosis. Renal function now improving. Hypotension  Continue midodrine, IV albumin (now off) and IV fluids as above    Anemia  Iron studies consistent with chronic disease  Transfuse PRN - unit ordered for 9/2  Vitamin K per hepatology for coagulopathy    Persistent leukocytosis: unclear etiology, no evidence of infection clinically. Afebrile. Has been on empiric IV ceftriaxone for 6 days, stopped 9/4. Sent UA and blood cultures. UA with yeast and bacteria, minimal pyuria, unlikely source of leukocytosis. Removed paredes 9/6. Follow-up blood cultures. Peritoneal fluid cell counts now c/w peritonitis. Consider chest/abd CT but utility is low given she can't get contrast due to renal failure, and clinically does not appear infected.     History of HTN    Labs off today - repeat before basic any treatments on these    Code status: DNR - continue full current care otherwise but do not escalate (if pt rapidly deteriorates make comfort care and inform sister over the phone 24/7). See 9/2 PN for my discussion with her. Prophylaxis: SCD  Care Plan discussed with: RN     Hospital Problems  Date Reviewed: 8/30/2022            Codes Class Noted POA    Severe protein-calorie malnutrition ([facility] [address] (Chronic) ICD-10-CM: T93  ICD-9-CM: 399  8/31/2022 Yes        * (Principal) Alcoholic cirrhosis of liver with ascites ([facility] [address] ICD-10-CM: K70.31  ICD-9-CM: 571.2  8/30/2022 Yes       Review of Systems:   Review of systems not obtained due to patient factors. Vital Signs:    Last 24hrs VS reviewed since prior progress note. Most recent are:  Visit Vitals  BP (!) 94/44   Pulse 66   Temp 98.7 °F (37.1 °C)   Resp 18   Ht 5' 3\" (1.6 m)   Wt 50.4 kg (111 lb 1.8 oz)   SpO2 96%   BMI 19.68 kg/m²     Patient Vitals for the past 24 hrs:   Temp Pulse Resp BP SpO2   09/10/22 2155 -- 66 -- -- --   09/10/22 1957 -- 73 -- -- --   09/10/22 1900 98.7 °F (37.1 °C) 67 18 (!) 94/44 96 %   09/10/22 1600 -- 71 17 (!) 102/51 96 %   09/10/22 1500 -- 65 16 (!) 91/44 96 %   09/10/22 1400 -- 65 16 (!) 101/46 98 %   09/10/22 1300 -- 76 20 (!) 103/54 96 %   09/10/22 1200 98.7 °F (37.1 °C) 64 15 (!) 101/45 97 %   09/10/22 1100 -- 64 12 (!) 101/51 98 %   09/10/22 1000 -- 67 -- -- --   09/10/22 0700 98.7 °F (37.1 °C) 72 18 (!) 107/46 97 %   09/10/22 0558 -- 65 -- -- --   09/10/22 0400 -- 68 -- -- --   09/10/22 0330 98.5 °F (36.9 °C) 66 19 (!) 108/58 97 %   09/10/22 0159 -- 68 -- -- --         Intake/Output Summary (Last 24 hours) at 9/10/2022 2338  Last data filed at 9/10/2022 2000  Gross per 24 hour   Intake 1760 ml   Output 2635 ml   Net -875 ml          Physical Examination:     I had a face to face encounter with this patient and independently examined them on 9/10/2022 as outlined below:          Constitutional:  NAD, cachectic, chronically-ill appearing   ENT:  Oral mucosa moist, Icteric sclerae    Resp:  CTA bilaterally. No wheezing/rhonchi/rales. No accessory muscle use.     CV:  Regular rhythm, normal rate, soft systolic murmur     GI: Soft, more distended today, NT, bs+    Musculoskeletal:  No edema, warm, 2+     Neurologic:  Grossly non-focal, alert currently but intermittently drowsy  Psych: not anxious nor agitated     Skin: jaundiced       Data Review:    Review and/or order of clinical lab test  Review and/or order of tests in the radiology section of [facility]      Labs:     No results for input(s): WBC, HGB, HCT, PLT, HGBEXT, HCTEXT, PLTEXT, HGBEXT, HCTEXT, PLTEXT in the last 72 hours. Recent Labs     09/10/22  0427 09/09/22  0219 09/08/22  0603    136 137   K 3.2* 3.5 3.7    98 98   CO2 25 30 28   BUN 63* 64* 57*   CREA 2.50* 2.79* 2.07*   GLU 77 105* 92   CA 8.4* 8.5 8.7   MG  --   --  1.5*   PHOS 3.4 3.1 3.6       Recent Labs     09/10/22  0427 09/09/22  0219 09/08/22  0603   ALT 14  --   --    *  --   --    TBILI 17.3*  --   --    TP 4.5*  --   --    ALB 3.4*  3.4* 3.7 3.5   GLOB 1.1*  --   --        Recent Labs     09/10/22  0427   APTT 95.0*        No results for input(s): FE, TIBC, PSAT, FERR in the last 72 hours. Lab Results   Component Value Date/Time    Folate 14.7 08/30/2022 01:52 AM        No results for input(s): PH, PCO2, PO2 in the last 72 hours. No results for input(s): CPK, CKNDX, TROIQ in the last 72 hours.     No lab exists for component: CPKMB  No results found for: CHOL, CHOLX, CHLST, CHOLV, HDL, HDLP, LDL, LDLC, DLDLP, TGLX, TRIGL, TRIGP, CHHD, CHHDX  Lab Results   Component Value Date/Time    Glucose (POC) 121 (H) 09/10/2022 04:14 PM    Glucose (POC) 128 (H) 09/10/2022 11:23 AM    Glucose (POC) 145 (H) 09/06/2022 11:20 AM    Glucose (POC) 131 (H) 09/05/2022 11:55 PM    Glucose (POC) 171 (H) 09/03/2022 12:06 AM     Lab Results   Component Value Date/Time    Color DARK YELLOW 09/04/2022 05:22 PM    Appearance TURBID (A) 09/04/2022 05:22 PM    Specific gravity 1.017 09/04/2022 05:22 PM    pH (UA) 5.5 09/04/2022 05:22 PM    Protein 100 (A) 09/04/2022 05:22 PM    Glucose Negative 09/04/2022 05:22 PM    Ketone Negative 09/04/2022 05:22 PM    Urobilinogen 1.0 09/04/2022 05:22 PM    Nitrites Positive (A) 09/04/2022 05:22 PM    Leukocyte Esterase LARGE (A) 09/04/2022 05:22 PM    Epithelial cells FEW 09/04/2022 05:22 PM    Bacteria 1+ (A) 09/04/2022 05:22 PM    WBC 5-10 09/04/2022 05:22 PM    RBC 5-10 09/04/2022 05:22 PM         Medications Reviewed:     Current Facility-Administered Medications   Medication Dose Route Frequency    magnesium oxide (MAG-OX) tablet 400 mg  400 mg Oral DAILY    potassium chloride SR (KLOR-CON 10) tablet 10 mEq  10 mEq Oral BID    nystatin (MYCOSTATIN) 100,000 unit/mL oral suspension 500,000 Units  500,000 Units Oral QID    [Held by provider] sodium bicarbonate (8.4%) 150 mEq in dextrose 5% 1,000 mL infusion   IntraVENous CONTINUOUS    lactulose (CHRONULAC) 10 gram/15 mL solution 30 mL  20 g Oral DAILY    midodrine (PROAMATINE) tablet 15 mg  15 mg Oral Q8H    octreotide (SANDOSTATIN) 500 mcg in 0.9% sodium chloride 500 mL infusion  50 mcg/hr IntraVENous CONTINUOUS    sodium bicarbonate tablet 650 mg  650 mg Oral BID    0.9% sodium chloride infusion 250 mL  250 mL IntraVENous PRN    [Held by provider] oxyCODONE IR (ROXICODONE) tablet 5 mg  5 mg Oral Q4H PRN    [Held by provider] cefTRIAXone (ROCEPHIN) 2 g in 0.9% sodium chloride 20 mL IV syringe  2 g IntraVENous Q24H    ARIPiprazole (ABILIFY) tablet 2 mg  2 mg Oral DAILY    buPROPion SR (WELLBUTRIN SR) tablet 150 mg  150 mg Oral DAILY    [Held by provider] LORazepam (ATIVAN) tablet 1 mg  1 mg Oral BID PRN    pantoprazole (PROTONIX) 40 mg in 0.9% sodium chloride 10 mL injection  40 mg IntraVENous ACB    sodium chloride (NS) flush 5-40 mL  5-40 mL IntraVENous Q8H    sodium chloride (NS) flush 5-40 mL  5-40 mL IntraVENous PRN    acetaminophen (TYLENOL) tablet 650 mg  650 mg Oral Q6H PRN    Or    acetaminophen (TYLENOL) suppository 650 mg  650 mg Rectal Q6H PRN    polyethylene glycol (MIRALAX) packet 17 g  17 g Oral DAILY PRN    ondansetron (ZOFRAN ODT) tablet 4 mg  4 mg Oral Q8H PRN    Or    ondansetron (ZOFRAN) injection 4 mg  4 mg IntraVENous Q6H PRN    heparin (porcine) injection 5,000 Units  5,000 Units SubCUTAneous Q8H    L.acidophilus-paracasei-S.thermophil-bifidobacter (RISAQUAD) 8 billion cell capsule  1 Capsule Oral DAILY    nicotine (NICODERM CQ) 21 mg/24 hr patch 1 Patch  1 Patch TransDERmal DAILY     ______________________________________________________________________  EXPECTED LENGTH OF STAY: 4d 16h  ACTUAL LENGTH OF STAY:          11                 Bigg Hanley MD

## 2022-09-11 NOTE — PROGRESS NOTES
1930: Bedside and Verbal shift change report given to PASHA Lee (oncoming nurse) by PASHA Monroy (offgoing nurse). Report included the following information SBAR, Kardex, Intake/Output, MAR, Recent Results, and Cardiac Rhythm SR .      0730: Bedside and Verbal shift change report given to Yovany Vela RN (oncoming nurse) by So Caldwell RN (offgoing nurse).  Report included the following information SBAR, Kardex, Intake/Output, MAR, Recent Results, and Cardiac Rhythm SR . Quality 110: Preventive Care And Screening: Influenza Immunization: Influenza Immunization Administered during Influenza season Quality 111:Pneumonia Vaccination Status For Older Adults: Pneumococcal Vaccination not Administered or Previously Received, Reason not Otherwise Specified Quality 226: Preventive Care And Screening: Tobacco Use: Screening And Cessation Intervention: Patient screened for tobacco and never smoked Name And Contact Information For Health Care Proxy: Case Hyatt [phone] Detail Level: Detailed Quality 431: Preventive Care And Screening: Unhealthy Alcohol Use - Screening: Patient screened for unhealthy alcohol use using a single question and scores less than 2 times per year

## 2022-09-11 NOTE — PROGRESS NOTES
Problem: Falls - Risk of  Goal: *Absence of Falls  Description: Document Rosana Harvey Fall Risk and appropriate interventions in the flowsheet. Outcome: Progressing Towards Goal  Note: Fall Risk Interventions:  Mobility Interventions: Bed/chair exit alarm, Communicate number of staff needed for ambulation/transfer    Mentation Interventions: Adequate sleep, hydration, pain control, Bed/chair exit alarm, Reorient patient    Medication Interventions: Bed/chair exit alarm    Elimination Interventions: Call light in reach, Toileting schedule/hourly rounds    History of Falls Interventions: Bed/chair exit alarm, Door open when patient unattended, Vital signs minimum Q4HRs X 24 hrs (comment for end date)         Problem: Pressure Injury - Risk of  Goal: *Prevention of pressure injury  Description: Document Chandana Scale and appropriate interventions in the flowsheet.   Outcome: Progressing Towards Goal  Note: Pressure Injury Interventions:  Sensory Interventions: Assess changes in LOC, Check visual cues for pain, Minimize linen layers    Moisture Interventions: Absorbent underpads, Apply protective barrier, creams and emollients, Check for incontinence Q2 hours and as needed, Internal/External urinary devices    Activity Interventions: Pressure redistribution bed/mattress(bed type), PT/OT evaluation    Mobility Interventions: HOB 30 degrees or less, Pressure redistribution bed/mattress (bed type), PT/OT evaluation    Nutrition Interventions: Document food/fluid/supplement intake    Friction and Shear Interventions: Apply protective barrier, creams and emollients, Foam dressings/transparent film/skin sealants, Minimize layers

## 2022-09-11 NOTE — PROGRESS NOTES
Renal Progress Note    NAME:  Joss Mtz   :   1960   MRN:   [id number]     Date/Time:  2022   Subjective:       , feels ok, no N/v/SOB. Worry about her pets   seen and examined, has no complaint, not oriented to place or time, had ascitis drain placed by IR, 3 lit drained sofar, 400 ml uo   more sleepy and less interactive, does not answer the questions. Oliguric, no significant change in cr. Possibly HRS. WBC still elevated. Has NGT on TF  9/3 awake, weak, denies pain, NGT is out and will be replaced. Had 600 ml urine/o, BP better, cr stable   more awake and alert, interactive today, wants to eat, , renal function improving, unable to place back the NGT, still significant leukocytosis. Denies abd pain, cough   awake and alert, denies pain, nausea, abd pain. On po diet now, refused TF. Renal function improving, borderline UO     sleeping, UO borderline 450ml, still draining>1.5 lit /day from ascitis, persistent leukocytosis, OFF Abx. Renal function improving slowly.  sitting in chair, feels ok, appetite better. Renal function improving   awake, denies complaints, low po intake,  ml, paredes has been removed.   Renal function improving    22 --> F/U RNADY  9-10-22 awake, eating lunch, no complaints, seems lucid     no complaints, creatinine stable, not increasing, making urine, able to eat      Medications reviewed:  Current Facility-Administered Medications   Medication Dose Route Frequency    magnesium oxide (MAG-OX) tablet 400 mg  400 mg Oral DAILY    potassium chloride SR (KLOR-CON 10) tablet 10 mEq  10 mEq Oral BID    nystatin (MYCOSTATIN) 100,000 unit/mL oral suspension 500,000 Units  500,000 Units Oral QID    [Held by provider] sodium bicarbonate (8.4%) 150 mEq in dextrose 5% 1,000 mL infusion   IntraVENous CONTINUOUS    lactulose (CHRONULAC) 10 gram/15 mL solution 30 mL  20 g Oral DAILY    midodrine (PROAMATINE) tablet 15 mg  15 mg Oral Q8H octreotide (SANDOSTATIN) 500 mcg in 0.9% sodium chloride 500 mL infusion  50 mcg/hr IntraVENous CONTINUOUS    sodium bicarbonate tablet 650 mg  650 mg Oral BID    0.9% sodium chloride infusion 250 mL  250 mL IntraVENous PRN    [Held by provider] oxyCODONE IR (ROXICODONE) tablet 5 mg  5 mg Oral Q4H PRN    [Held by provider] cefTRIAXone (ROCEPHIN) 2 g in 0.9% sodium chloride 20 mL IV syringe  2 g IntraVENous Q24H    ARIPiprazole (ABILIFY) tablet 2 mg  2 mg Oral DAILY    buPROPion SR (WELLBUTRIN SR) tablet 150 mg  150 mg Oral DAILY    [Held by provider] LORazepam (ATIVAN) tablet 1 mg  1 mg Oral BID PRN    pantoprazole (PROTONIX) 40 mg in 0.9% sodium chloride 10 mL injection  40 mg IntraVENous ACB    sodium chloride (NS) flush 5-40 mL  5-40 mL IntraVENous Q8H    sodium chloride (NS) flush 5-40 mL  5-40 mL IntraVENous PRN    acetaminophen (TYLENOL) tablet 650 mg  650 mg Oral Q6H PRN    Or    acetaminophen (TYLENOL) suppository 650 mg  650 mg Rectal Q6H PRN    polyethylene glycol (MIRALAX) packet 17 g  17 g Oral DAILY PRN    ondansetron (ZOFRAN ODT) tablet 4 mg  4 mg Oral Q8H PRN    Or    ondansetron (ZOFRAN) injection 4 mg  4 mg IntraVENous Q6H PRN    heparin (porcine) injection 5,000 Units  5,000 Units SubCUTAneous Q8H    L.acidophilus-paracasei-S.thermophil-bifidobacter (RISAQUAD) 8 billion cell capsule  1 Capsule Oral DAILY    nicotine (NICODERM CQ) 21 mg/24 hr patch 1 Patch  1 Patch TransDERmal DAILY        Objective:   Vitals:  Visit Vitals  BP (!) 92/43   Pulse 66   Temp (!) 96.1 °F (35.6 °C)   Resp 15   Ht 5' 3\" (1.6 m)   Wt 50.4 kg (111 lb 1.8 oz)   SpO2 97%   BMI 19.68 kg/m²     Temp (24hrs), Av.2 °F (36.8 °C), Min:96.1 °F (35.6 °C), Max:98.9 °F (37.2 °C)      O2 Device: None (Room air)    Last 24hr Input/Output:    Intake/Output Summary (Last 24 hours) at 2022 1534  Last data filed at 2022 1130  Gross per 24 hour   Intake 1800 ml   Output 3110 ml   Net -1310 ml          Physical Exam:    Seen in Room 464. General: Chronically ill-looking lady , jaundiced, awake     HEENT:  sclerae icteric,    Neck: Supple    Lungs : Clear to auscultation, no wheezes, no rales    CVS: RRR, S1 S2 normal, No rub, no  LE edema    Abdomen: distended ++,   Ascites+,    Extremities: No edema    Skin: jaundice    Neurologic: Awake and interacting    Lab Data Reviewed:    Recent Results (from the past 24 hour(s))   GLUCOSE, POC    Collection Time: 09/10/22  4:14 PM   Result Value Ref Range    Glucose (POC) 121 (H) 65 - 117 mg/dL    Performed by Eleonora He    RENAL FUNCTION PANEL    Collection Time: 09/11/22  4:05 AM   Result Value Ref Range    Sodium 136 136 - 145 mmol/L    Potassium 3.3 (L) 3.5 - 5.1 mmol/L    Chloride 101 97 - 108 mmol/L    CO2 22 21 - 32 mmol/L    Anion gap 13 5 - 15 mmol/L    Glucose 80 65 - 100 mg/dL    BUN 65 (H) 6 - 20 MG/DL    Creatinine 2.46 (H) 0.55 - 1.02 MG/DL    BUN/Creatinine ratio 26 (H) 12 - 20      GFR est AA 24 (L) >60 ml/min/1.73m2    GFR est non-AA 20 (L) >60 ml/min/1.73m2    Calcium 8.6 8.5 - 10.1 MG/DL    Phosphorus 3.4 2.6 - 4.7 MG/DL    Albumin 3.3 (L) 3.5 - 5.0 g/dL   PROTHROMBIN TIME + INR    Collection Time: 09/11/22  4:05 AM   Result Value Ref Range    INR 1.9 (H) 0.9 - 1.1      Prothrombin time 19.2 (H) 9.0 - 11.1 sec   HEPATIC FUNCTION PANEL    Collection Time: 09/11/22  4:05 AM   Result Value Ref Range    Protein, total 4.6 (L) 6.4 - 8.2 g/dL    Albumin 3.2 (L) 3.5 - 5.0 g/dL    Globulin 1.4 (L) 2.0 - 4.0 g/dL    A-G Ratio 2.3 (H) 1.1 - 2.2      Bilirubin, total 18.6 (H) 0.2 - 1.0 MG/DL    Bilirubin, direct 12.6 (H) 0.0 - 0.2 MG/DL    Alk.  phosphatase 154 (H) 45 - 117 U/L    AST (SGOT) 57 (H) 15 - 37 U/L    ALT (SGPT) 15 12 - 78 U/L   MAGNESIUM    Collection Time: 09/11/22  4:05 AM   Result Value Ref Range    Magnesium 1.8 1.6 - 2.4 mg/dL         Assessment/Plan:   Principal Problem:    Alcoholic cirrhosis of liver with ascites ([facility] [address] (8/30/2022)    Active Problems: Severe protein-calorie malnutrition ([facility] [address] (8/31/2022)     Acute kidney injury -cr 0.4 in early August,   -RANDY/possible ATN due to intravascular volume depletion in setting of poor po intake, hypotension /third spacing and possible sepsis/NSAIDs. Hepatorenal in DDx . Low FeNa-low urine output  Possible HRS. Not a candidate liver transplant Per Dr Mackenzie Guardado. Not a candidate for RRT. -renal function improving  -c/w  po bicarb   -off tube feeding-nutritional support  -strict Is and Os  -avoid nephrotoxins, IV contrast/NSAIDs  -dose meds per GFR  -f/up  renal function  - has persistent leukocytosis and worsening, ID consulted  -c/w with IV albumin, change to q12hr, octreotide and midodrine       Hypotension-with h/o HTN on Coreg, due to effective intravascular volume depletion  -on prednisone per med list, but patient denies taking that.  -midodrine     Hyponatremia,resolved, -due to liver cirrhosis and volume depletion, chronic since early August  -improving with IVF  -Avoid rapid correction  -improved    Hypokalemia/hypophosphatemia, hypomagnesemia   -replete   -check Mg    Alcoholic liver cirrhosis-decompensated. -Hypoalbuminemia/ascitis/coagulopathy-nl ammonia  -hepatology following-not a candidate for liver transplant.  Poor prognosis  - on lactulose    Anemia  -high iron stores  -Hb has dropped, might be due to hydration, r/o bleed  -transfuse for Hb<7    Persistent Leukocytosis: off Rocephin-source of infection? -manegaement per primary team    Hypoglycemia, resolved    DNR    Poor prognosis    Give oral Mg and K  ___________________________________________________    Total time spent with patient:  [] 15   [] 25   [x] 35   []  __ minutes    [] Critical Care Provided    Care Plan discussed with        [x] Patient   [] Family    [] Care Manager   [] Consultant/Specialist :      []   >50% of visit spent in counseling and coordination of care   (Discussed [] CODE status,  [] Care Plan, [] D/C Planning)    ___________________________________________________    Attending Physician: MD Randall Piña

## 2022-09-12 LAB
ALBUMIN SERPL-MCNC: 2.9 G/DL (ref 3.5–5)
ANION GAP SERPL CALC-SCNC: 9 MMOL/L (ref 5–15)
BUN SERPL-MCNC: 63 MG/DL (ref 6–20)
BUN/CREAT SERPL: 29 (ref 12–20)
CALCIUM SERPL-MCNC: 8.7 MG/DL (ref 8.5–10.1)
CHLORIDE SERPL-SCNC: 102 MMOL/L (ref 97–108)
CO2 SERPL-SCNC: 23 MMOL/L (ref 21–32)
CREAT SERPL-MCNC: 2.21 MG/DL (ref 0.55–1.02)
GLUCOSE SERPL-MCNC: 86 MG/DL (ref 65–100)
PHOSPHATE SERPL-MCNC: 3.2 MG/DL (ref 2.6–4.7)
POTASSIUM SERPL-SCNC: 3.4 MMOL/L (ref 3.5–5.1)
SODIUM SERPL-SCNC: 134 MMOL/L (ref 136–145)

## 2022-09-12 PROCEDURE — 80069 RENAL FUNCTION PANEL: CPT

## 2022-09-12 PROCEDURE — 97530 THERAPEUTIC ACTIVITIES: CPT

## 2022-09-12 PROCEDURE — C9113 INJ PANTOPRAZOLE SODIUM, VIA: HCPCS | Performed by: INTERNAL MEDICINE

## 2022-09-12 PROCEDURE — 65660000001 HC RM ICU INTERMED STEPDOWN

## 2022-09-12 PROCEDURE — 74011250636 HC RX REV CODE- 250/636: Performed by: INTERNAL MEDICINE

## 2022-09-12 PROCEDURE — 74011250637 HC RX REV CODE- 250/637: Performed by: HOSPITALIST

## 2022-09-12 PROCEDURE — 74011250637 HC RX REV CODE- 250/637: Performed by: INTERNAL MEDICINE

## 2022-09-12 PROCEDURE — 74011000250 HC RX REV CODE- 250: Performed by: INTERNAL MEDICINE

## 2022-09-12 PROCEDURE — 74011636637 HC RX REV CODE- 636/637: Performed by: INTERNAL MEDICINE

## 2022-09-12 PROCEDURE — 36415 COLL VENOUS BLD VENIPUNCTURE: CPT

## 2022-09-12 RX ORDER — POTASSIUM CHLORIDE 750 MG/1
10 TABLET, FILM COATED, EXTENDED RELEASE ORAL
Status: COMPLETED | OUTPATIENT
Start: 2022-09-12 | End: 2022-09-12

## 2022-09-12 RX ADMIN — Medication 1 CAPSULE: at 09:46

## 2022-09-12 RX ADMIN — LACTULOSE 30 ML: 20 SOLUTION ORAL at 09:47

## 2022-09-12 RX ADMIN — NYSTATIN 500000 UNITS: 100000 SUSPENSION ORAL at 18:29

## 2022-09-12 RX ADMIN — Medication 400 MG: at 09:46

## 2022-09-12 RX ADMIN — SODIUM BICARBONATE 650 MG: 650 TABLET ORAL at 18:29

## 2022-09-12 RX ADMIN — NYSTATIN 500000 UNITS: 100000 SUSPENSION ORAL at 13:48

## 2022-09-12 RX ADMIN — SODIUM CHLORIDE, PRESERVATIVE FREE 10 ML: 5 INJECTION INTRAVENOUS at 21:43

## 2022-09-12 RX ADMIN — POTASSIUM CHLORIDE 10 MEQ: 750 TABLET, FILM COATED, EXTENDED RELEASE ORAL at 12:27

## 2022-09-12 RX ADMIN — MIDODRINE HYDROCHLORIDE 15 MG: 5 TABLET ORAL at 21:43

## 2022-09-12 RX ADMIN — ARIPIPRAZOLE 2 MG: 2 TABLET ORAL at 09:46

## 2022-09-12 RX ADMIN — HEPARIN SODIUM 5000 UNITS: 5000 INJECTION INTRAVENOUS; SUBCUTANEOUS at 18:29

## 2022-09-12 RX ADMIN — POTASSIUM CHLORIDE 10 MEQ: 750 TABLET, FILM COATED, EXTENDED RELEASE ORAL at 18:29

## 2022-09-12 RX ADMIN — MIDODRINE HYDROCHLORIDE 15 MG: 5 TABLET ORAL at 06:43

## 2022-09-12 RX ADMIN — SODIUM CHLORIDE, PRESERVATIVE FREE 10 ML: 5 INJECTION INTRAVENOUS at 06:43

## 2022-09-12 RX ADMIN — POTASSIUM CHLORIDE 10 MEQ: 750 TABLET, FILM COATED, EXTENDED RELEASE ORAL at 09:46

## 2022-09-12 RX ADMIN — NYSTATIN 500000 UNITS: 100000 SUSPENSION ORAL at 21:43

## 2022-09-12 RX ADMIN — OCTREOTIDE ACETATE 50 MCG/HR: 500 INJECTION, SOLUTION INTRAVENOUS; SUBCUTANEOUS at 19:29

## 2022-09-12 RX ADMIN — NYSTATIN 500000 UNITS: 100000 SUSPENSION ORAL at 09:47

## 2022-09-12 RX ADMIN — SODIUM BICARBONATE 650 MG: 650 TABLET ORAL at 09:46

## 2022-09-12 RX ADMIN — PANTOPRAZOLE SODIUM 40 MG: 40 INJECTION, POWDER, FOR SOLUTION INTRAVENOUS at 06:43

## 2022-09-12 RX ADMIN — BUPROPION HYDROCHLORIDE 150 MG: 150 TABLET, EXTENDED RELEASE ORAL at 09:46

## 2022-09-12 RX ADMIN — HEPARIN SODIUM 5000 UNITS: 5000 INJECTION INTRAVENOUS; SUBCUTANEOUS at 09:46

## 2022-09-12 RX ADMIN — MIDODRINE HYDROCHLORIDE 15 MG: 5 TABLET ORAL at 13:48

## 2022-09-12 NOTE — PROGRESS NOTES
Medicaid LTSS Screening submitted for processing as expected disposition is LTC placement.     [address]   [phone]

## 2022-09-12 NOTE — PROGRESS NOTES
Problem: Falls - Risk of  Goal: *Absence of Falls  Description: Document Marty Kraft Fall Risk and appropriate interventions in the flowsheet. Outcome: Progressing Towards Goal  Note: Fall Risk Interventions:  Mobility Interventions: Bed/chair exit alarm, Communicate number of staff needed for ambulation/transfer, Patient to call before getting OOB    Mentation Interventions: Adequate sleep, hydration, pain control, Bed/chair exit alarm, More frequent rounding, Reorient patient, Toileting rounds    Medication Interventions: Assess postural VS orthostatic hypotension, Bed/chair exit alarm, Patient to call before getting OOB, Teach patient to arise slowly    Elimination Interventions: Bed/chair exit alarm, Call light in reach, Patient to call for help with toileting needs, Toileting schedule/hourly rounds    History of Falls Interventions: Bed/chair exit alarm, Door open when patient unattended, Vital signs minimum Q4HRs X 24 hrs (comment for end date)         Problem: Pressure Injury - Risk of  Goal: *Prevention of pressure injury  Description: Document Chandana Scale and appropriate interventions in the flowsheet.   Outcome: Progressing Towards Goal  Note: Pressure Injury Interventions:  Sensory Interventions: Assess changes in LOC, Check visual cues for pain, Minimize linen layers    Moisture Interventions: Absorbent underpads, Apply protective barrier, creams and emollients, Check for incontinence Q2 hours and as needed, Minimize layers    Activity Interventions: Pressure redistribution bed/mattress(bed type), PT/OT evaluation    Mobility Interventions: HOB 30 degrees or less, Pressure redistribution bed/mattress (bed type), PT/OT evaluation    Nutrition Interventions: Document food/fluid/supplement intake, Offer support with meals,snacks and hydration    Friction and Shear Interventions: Apply protective barrier, creams and emollients, Foam dressings/transparent film/skin sealants, Minimize layers Problem: General Medical Care Plan  Goal: *Optimal pain control at patient's stated goal  Outcome: Progressing Towards Goal     Problem:  Body Temperature -  Risk of, Imbalanced  Goal: *Absence of cold stress or hypothermia signs and symptoms  Outcome: Progressing Towards Goal

## 2022-09-12 NOTE — PROGRESS NOTES
Transitions of Care Plan  RUR: 13% - low  Clinical Update: remains on IV medications; Hepatology to clear for d/c  Consults: Hepatology; ID; Nephrology; Therapy  Baseline: independent without DME; hx of heavy ETOH; no social support   Barriers to Discharge: medical; placement  Disposition: LTC Facility - mass referrals sent to [place], [place] area  Estimated Discharge Date: 2+ days    CM spoke with attending MD - patient will need LTC placement. Sister is agreeable to plan. Patient with intermittent confusion. Per MD, patient remains on some IV medications with Hepatology. Hepatology will need to clear for discharge to LTC facility. CM followed up on mass referrals to [address] [place] [address] in [place], [place] and attached clinical information to [facility] referrals for review.  Referral sent to the following facilities:     [facility] [address] at [place], [place]) -  [facility] of [address] of [address] [place] [address] [address] of [address] of [address]  [address] and [address] and [address] and [address] of [address]) -   [facility]) - [address] [facility] at [address] [facility]) -   [address] will continue to follow.     Tamiko Garcia, MPH  Care Manager [facility]  Available via [facility] or

## 2022-09-12 NOTE — PROGRESS NOTES
Problem: Mobility Impaired (Adult and Pediatric)  Goal: *Acute Goals and Plan of Care (Insert Text)  Description: FUNCTIONAL STATUS PRIOR TO ADMISSION: Patient was modified independent using a rollator for functional mobility per patient, however patient is questionable historian. Patient notably with gross muscle wasting in LEs and UEs. HOME SUPPORT PRIOR TO ADMISSION: The patient lived alone with no local support. Physical Therapy Goals  Updated 9/8/2022  1. Patient will move from supine to sit and sit to supine  in bed with modified independence within 7 day(s). 2.  Patient will transfer from bed to chair and chair to bed with modified independence using the least restrictive device within 7 day(s). 3.  Patient will perform sit to stand with modified independence within 7 day(s). 4.  Patient will ambulate with modified independence for 30 feet with the least restrictive device within 7 day(s). 5.  Patient will ascend/descend 3 stairs with bilateral handrail(s) with modified independence within 7 day(s). Physical Therapy Goals  Initiated 9/1/2022  1. Patient will move from supine to sit and sit to supine  in bed with modified independence within 7 day(s). 2.  Patient will transfer from bed to chair and chair to bed with modified independence using the least restrictive device within 7 day(s). 3.  Patient will perform sit to stand with modified independence within 7 day(s). 4.  Patient will ambulate with modified independence for 50 feet with the least restrictive device within 7 day(s). 5.  Patient will ascend/descend 3 stairs with bilateral handrail(s) with modified independence within 7 day(s).     Outcome: Progressing Towards Goal    PHYSICAL THERAPY TREATMENT  Patient: Halley Ace (54 y.o. female)  Date: 9/12/2022  Diagnosis: Alcoholic cirrhosis of liver with ascites (HCC) [T87.08] Alcoholic cirrhosis of liver with ascites ([facility] [address]      Precautions: Fall  Chart, physical therapy assessment, plan of care and goals were reviewed. ASSESSMENT  Patient continues with skilled PT services and is progressing towards goals. Pt agreeable to OOB. Pt reports initial dizziness with transition to EOB /43. Dizziness resolved with time. Pt was able to ambulate a short distance with assistance. . pt tolerating OOB in chair. /52 HR 77.     Current Level of Function Impacting Discharge (mobility/balance): mod A     Other factors to consider for discharge: activity tolerance          PLAN :  Patient continues to benefit from skilled intervention to address the above impairments. Continue treatment per established plan of care. to address goals. Recommendation for discharge: (in order for the patient to meet his/her long term goals)  Therapy up to 5 days/week in SNF setting  vs LTC    This discharge recommendation:  Has not yet been discussed the attending provider and/or case management    IF patient discharges home will need the following DME: rolling walker       SUBJECTIVE:   Patient stated I am ready now.     OBJECTIVE DATA SUMMARY:   Critical Behavior:  Neurologic State: Confused, Alert  Orientation Level: Oriented to person, Oriented to place, Disoriented to time, Disoriented to situation  Cognition: Memory loss, Follows commands, Poor safety awareness  Safety/Judgement: Decreased insight into deficits, Fall prevention  Functional Mobility Training:  Bed Mobility:     Supine to Sit: Moderate assistance     Scooting:  Moderate assistance        Transfers:  Sit to Stand: Minimum assistance  Stand to Sit: Minimum assistance                             Balance:  Sitting: Impaired  Sitting - Static: Fair (occasional)  Standing: Impaired  Standing - Static: Fair  Standing - Dynamic : Fair  Ambulation/Gait Training:  Distance (ft): 5 Feet (ft)  Assistive Device: Gait belt;Walker, rolling  Ambulation - Level of Assistance: Minimal assistance;Assist x1        Gait Abnormalities: Decreased step clearance;Trunk sway increased        Base of Support: Widened        Step Length: Right shortened;Left shortened                   Stairs: Therapeutic Exercises:     Pain Rating:  Abdomin     Activity Tolerance:   Limited     After treatment patient left in no apparent distress:   Sitting in chair, Call bell within reach, and Bed / chair alarm activated    COMMUNICATION/COLLABORATION:   The patients plan of care was discussed with: Registered nurse.      Sirena Carlson PTA   Time Calculation: 20 mins

## 2022-09-12 NOTE — PROGRESS NOTES
1930: Bedside and Verbal shift change report given to PASHA Lee (oncoming nurse) by Mellisa Santiago RN (offgoing nurse). Report included the following information SBAR, Kardex, Intake/Output, MAR, Recent Results, and Cardiac Rhythm SR .      0730: Bedside and Verbal shift change report given to Windy Santacruz RN (oncoming nurse) by Dane Figueroa RN (offgoing nurse).  Report included the following information SBAR, Kardex, Intake/Output, MAR, Recent Results, and Cardiac Rhythm SR .

## 2022-09-12 NOTE — PROGRESS NOTES
Renal Progress Note    NAME:  Nan Rosales   :   1960   MRN:   [id number]     Date/Time:  2022   Subjective:        seen and examined, has no complaint, not oriented to place or time, had ascitis drain placed by IR, 3 lit drained sofar, 400 ml uo   more sleepy and less interactive, does not answer the questions. Oliguric, no significant change in cr. Possibly HRS. WBC still elevated. Has NGT on TF  9/3 awake, weak, denies pain, NGT is out and will be replaced. Had 600 ml urine/o, BP better, cr stable   more awake and alert, interactive today, wants to eat, , renal function improving, unable to place back the NGT, still significant leukocytosis. Denies abd pain, cough   awake and alert, denies pain, nausea, abd pain. On po diet now, refused TF. Renal function improving, borderline UO     sleeping, UO borderline 450ml, still draining>1.5 lit /day from ascitis, persistent leukocytosis, OFF Abx. Renal function improving slowly.  sitting in chair, feels ok, appetite better. Renal function improving   awake, denies complaints, low po intake,  ml, paredes has been removed. Renal function improving  22 --> F/U RANDY  9-10-22 awake, eating lunch, no complaints, seems lucid   no complaints, creatinine stable, not increasing, making urine, able to eat   eating breakfast, denies N/V/Abd pain, UO not recoreded.  Cr decreasing, k low      Medications reviewed:  Current Facility-Administered Medications   Medication Dose Route Frequency    magnesium oxide (MAG-OX) tablet 400 mg  400 mg Oral DAILY    potassium chloride SR (KLOR-CON 10) tablet 10 mEq  10 mEq Oral BID    nystatin (MYCOSTATIN) 100,000 unit/mL oral suspension 500,000 Units  500,000 Units Oral QID    [Held by provider] sodium bicarbonate (8.4%) 150 mEq in dextrose 5% 1,000 mL infusion   IntraVENous CONTINUOUS    lactulose (CHRONULAC) 10 gram/15 mL solution 30 mL  20 g Oral DAILY    midodrine (PROAMATINE) tablet 15 mg  15 mg Oral Q8H    octreotide (SANDOSTATIN) 500 mcg in 0.9% sodium chloride 500 mL infusion  50 mcg/hr IntraVENous CONTINUOUS    sodium bicarbonate tablet 650 mg  650 mg Oral BID    0.9% sodium chloride infusion 250 mL  250 mL IntraVENous PRN    [Held by provider] oxyCODONE IR (ROXICODONE) tablet 5 mg  5 mg Oral Q4H PRN    ARIPiprazole (ABILIFY) tablet 2 mg  2 mg Oral DAILY    buPROPion SR (WELLBUTRIN SR) tablet 150 mg  150 mg Oral DAILY    [Held by provider] LORazepam (ATIVAN) tablet 1 mg  1 mg Oral BID PRN    pantoprazole (PROTONIX) 40 mg in 0.9% sodium chloride 10 mL injection  40 mg IntraVENous ACB    sodium chloride (NS) flush 5-40 mL  5-40 mL IntraVENous Q8H    sodium chloride (NS) flush 5-40 mL  5-40 mL IntraVENous PRN    acetaminophen (TYLENOL) tablet 650 mg  650 mg Oral Q6H PRN    Or    acetaminophen (TYLENOL) suppository 650 mg  650 mg Rectal Q6H PRN    polyethylene glycol (MIRALAX) packet 17 g  17 g Oral DAILY PRN    ondansetron (ZOFRAN ODT) tablet 4 mg  4 mg Oral Q8H PRN    Or    ondansetron (ZOFRAN) injection 4 mg  4 mg IntraVENous Q6H PRN    heparin (porcine) injection 5,000 Units  5,000 Units SubCUTAneous Q8H    L.acidophilus-paracasei-S.thermophil-bifidobacter (RISAQUAD) 8 billion cell capsule  1 Capsule Oral DAILY    nicotine (NICODERM CQ) 21 mg/24 hr patch 1 Patch  1 Patch TransDERmal DAILY        Objective:   Vitals:  Visit Vitals  BP (!) 93/27 (BP 1 Location: Right upper arm, BP Patient Position: At rest)   Pulse 64   Temp 97.9 °F (36.6 °C)   Resp 13   Ht 5' 3\" (1.6 m)   Wt 47.5 kg (104 lb 11.2 oz)   SpO2 98%   BMI 18.55 kg/m²     Temp (24hrs), Av.8 °F (36.6 °C), Min:96.1 °F (35.6 °C), Max:100 °F (37.8 °C)      O2 Device: None (Room air)    Last 24hr Input/Output:    Intake/Output Summary (Last 24 hours) at 2022 0944  Last data filed at 2022 0400  Gross per 24 hour   Intake 1190 ml   Output 3495 ml   Net -2305 ml          Physical Exam:    Seen in Room 464.     General: Chronically ill-looking lady , jaundiced, awake     HEENT:  sclerae icteric,    Neck: Supple    Lungs : Clear to auscultation, no wheezes, no rales    CVS: RRR, S1 S2 normal, No rub, no  LE edema    Abdomen: Ascites+, drain+, not tender    Extremities: No edema    Skin: jaundice    Neurologic: Awake and interacting    Lab Data Reviewed:    Recent Results (from the past 24 hour(s))   RENAL FUNCTION PANEL    Collection Time: 09/12/22  6:23 AM   Result Value Ref Range    Sodium 134 (L) 136 - 145 mmol/L    Potassium 3.4 (L) 3.5 - 5.1 mmol/L    Chloride 102 97 - 108 mmol/L    CO2 23 21 - 32 mmol/L    Anion gap 9 5 - 15 mmol/L    Glucose 86 65 - 100 mg/dL    BUN 63 (H) 6 - 20 MG/DL    Creatinine 2.21 (H) 0.55 - 1.02 MG/DL    BUN/Creatinine ratio 29 (H) 12 - 20      GFR est AA 27 (L) >60 ml/min/1.73m2    GFR est non-AA 23 (L) >60 ml/min/1.73m2    Calcium 8.7 8.5 - 10.1 MG/DL    Phosphorus 3.2 2.6 - 4.7 MG/DL    Albumin 2.9 (L) 3.5 - 5.0 g/dL         Assessment/Plan:   Principal Problem:    Alcoholic cirrhosis of liver with ascites (HCC) (8/30/2022)    Active Problems:    Severe protein-calorie malnutrition ([facility] [address] (8/31/2022)     Acute kidney injury -cr 0.4 in early August,   -RANDY/possible ATN due to intravascular volume depletion in setting of poor po intake, hypotension /third spacing and possible sepsis/NSAIDs. Hepatorenal in DDx . Low FeNa-low urine output  Possible HRS. Not a candidate liver transplant Per Dr Nadya Pate. Not a candidate for RRT.    -renal function improving  -c/w  po bicarb   -off tube feeding-nutritional support  -strict Is and Os  -avoid nephrotoxins, IV contrast/NSAIDs  -dose meds per GFR  - has persistent leukocytosis and worsening, ID consulted  -c/w with IV albumin, change to q12hr, octreotide and midodrine       Hypotension-with h/o HTN on Coreg, due to effective intravascular volume depletion  -on prednisone per med list, but patient denies taking that.  -midodrine     Hyponatremia,resolved, -due to liver cirrhosis and volume depletion, chronic since early August  -improving with IVF  -Avoid rapid correction  -improved    Hypokalemia/hypophosphatemia, hypomagnesemia   -replete   -replete Mg    Alcoholic liver cirrhosis-decompensated. -Hypoalbuminemia/ascitis/coagulopathy-nl ammonia  -hepatology following-not a candidate for liver transplant.  Poor prognosis  - on lactulose    Anemia  -high iron stores  -Hb has dropped, might be due to hydration, r/o bleed  -transfuse for Hb<7    Persistent Leukocytosis: off Rocephin-source of infection? -manegaement per primary team    Hypoglycemia, resolved    DNR    Overall Poor prognosis    Give oral Mg and K  ___________________________________________________    Total time spent with patient:  [] 15   [] 25   [x] 35   []  __ minutes    [] Critical Care Provided    Care Plan discussed with RN        [x] Patient   [] Family    [] Care Manager   [] Consultant/Specialist :      []   >50% of visit spent in counseling and coordination of care   (Discussed [] CODE status,  [] Care Plan, [] D/C Planning)    ___________________________________________________    Attending Physician: MD Randall Duran

## 2022-09-12 NOTE — PROGRESS NOTES
[facility] Adult  Hospitalist Group                                                                                          Hospitalist Progress Note  Taco Bender MD  Answering service: [phone] or [phone] from in house phone        Date of Service:  2022  NAME:  Allie Mitchell  :  1960  MRN:  [id number]      Admission Summary:   Per H&P, This is a 41-year-old woman with a past medical history significant for hypertension, alcoholic liver cirrhosis, who presented at the emergency room with abdominal pain. The patient presented at [facility] Emergency Room. When the patient arrived at the emergency room, the patient was found to have decompensation of liver cirrhosis. The hospitalist service at [facility] was asked to directly admit the patient to [facility], so that the patient can be seen by the hepatologist.  The patient came to the emergency room at [facility], because of lack of bed on the on the medical floor. When the patient arrived at the emergency room, the patient's lab work shows significant abnormality. She was subsequently referred to the hospitalist service for admission. No record of prior admission to this hospital.  It was reported that the patient underwent abdominal paracentesis before coming to [facility].  The amount of fluid removed is not known. The patient's abdominal pain is diffuse in location, dull ache, constant, 7/10 in severity with no known aggravating or relieving factors associated with nausea, but no vomiting, no diarrhea, and no constipation. Interval history / Subjective:   Wants to advance diet, states she isn't eating much because she doesn't like the food. Denies pain, dyspnea, n/v/d.      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 Serum ammonia was mildly elevated. Likely developed HE. Will continue lactulose at low dose with goal 2 bm's daily. Severe protein calorie malnutrition  Muscle wasting, bitemporal wasting. Due to her disease process  DHT advanced to duodenum 9/1 and resumed TFs - FT came out overnight, could not be replaced. Became more alert after lactulose so now on diet but PO intake remains poor. Refusing TF placement now. Discussed at length with her to at least finish her Ensures otherwise the FT has to be reinserted else she won't survive due to malnutrition. Acute kidney injury  Suspected ATN, IVVD due to poor PO intake, hypotension, NSAID use. HRS on ddx. No improvement with IV albumin but continue the same. IVF resumed 9/4 due to NGT being out. D/C bicarb drip today as she is developing an alkalosis. Renal function now improving. Hypotension  Continue midodrine, IV albumin (now off) and IV fluids as above    Anemia  Iron studies consistent with chronic disease  Transfuse PRN - unit ordered for 9/2  Vitamin K per hepatology for coagulopathy    Persistent leukocytosis: unclear etiology, no evidence of infection clinically. Afebrile. Has been on empiric IV ceftriaxone for 6 days, stopped 9/4. Sent UA and blood cultures. UA with yeast and bacteria, minimal pyuria, unlikely source of leukocytosis. Removed paredes 9/6. Follow-up blood cultures. Peritoneal fluid cell counts now c/w peritonitis. Consider chest/abd CT but utility is low given she can't get contrast due to renal failure, and clinically does not appear infected.     History of HTN    Hypothermia- Sandy mcintyre ordered    Code status: DNR - continue full current care otherwise but do not escalate (if pt rapidly deteriorates make comfort care and inform sister over the phone 24/7). See 9/2 PN for my discussion with her. Prophylaxis: SCD  Care Plan discussed with: RN     Hospital Problems  Date Reviewed: 8/30/2022            Codes Class Noted POA    Severe protein-calorie malnutrition ([address] (Chronic) ICD-10-CM: K76  ICD-9-CM: 170  8/31/2022 Yes        * (Principal) Alcoholic cirrhosis of liver with ascites ([address] ICD-10-CM: K70.31  ICD-9-CM: 571.2  8/30/2022 Yes       Review of Systems:   Review of systems not obtained due to patient factors. Vital Signs:    Last 24hrs VS reviewed since prior progress note.  Most recent are:  Visit Vitals  BP (!) 93/40 (BP 1 Location: Right upper arm, BP Patient Position: At rest)   Pulse 70   Temp 98.6 °F (37 °C)   Resp 17   Ht 5' 3\" (1.6 m)   Wt 50.4 kg (111 lb 1.8 oz)   SpO2 99%   BMI 19.68 kg/m²     Patient Vitals for the past 24 hrs:   Temp Pulse Resp BP SpO2   09/11/22 2000 -- 70 -- -- --   09/11/22 1915 98.6 °F (37 °C) 73 17 (!) 93/40 99 %   09/11/22 1800 -- 75 -- -- --   09/11/22 1726 100 °F (37.8 °C) -- -- -- --   09/11/22 1600 -- 66 -- -- --   09/11/22 1537 (!) 96.1 °F (35.6 °C) 70 15 (!) 101/46 100 %   09/11/22 1404 -- 66 -- (!) 92/43 --   09/11/22 1400 -- 67 -- -- --   09/11/22 1200 -- 65 -- -- --   09/11/22 1130 (!) 96.1 °F (35.6 °C) 63 15 (!) 90/46 97 %   09/11/22 1000 -- 73 -- -- --   09/11/22 0759 -- 66 -- -- --   09/11/22 0700 98.7 °F (37.1 °C) 71 19 (!) 106/51 94 %   09/11/22 0600 -- 71 -- -- --   09/11/22 0400 -- 66 -- -- --   09/11/22 0330 98.7 °F (37.1 °C) 72 17 (!) 96/50 92 %   09/11/22 0200 -- 68 -- -- --   09/10/22 2352 98.9 °F (37.2 °C) 71 18 (!) 109/49 90 %         Intake/Output Summary (Last 24 hours) at 9/11/2022 2211  Last data filed at 9/11/2022 2000  Gross per 24 hour   Intake 1400 ml   Output 3475 ml   Net -2075 ml          Physical Examination:     I had a face to face encounter with this patient and independently examined them on 9/11/2022 as outlined below:          Constitutional:  NAD, cachectic, chronically-ill appearing   ENT:  Oral mucosa moist, Icteric sclerae    Resp:  CTA bilaterally. No wheezing/rhonchi/rales. No accessory muscle use. CV:  Regular rhythm, normal rate, soft systolic murmur     GI: Soft, more distended today, NT, bs+    Musculoskeletal:  No edema, warm, 2+     Neurologic:  Grossly non-focal, alert currently but intermittently drowsy  Psych: not anxious nor agitated     Skin: jaundiced       Data Review:    Review and/or order of clinical lab test  Review and/or order of tests in the radiology section of [facility]      Labs:     No results for input(s): WBC, HGB, HCT, PLT, HGBEXT, HCTEXT, PLTEXT, HGBEXT, HCTEXT, PLTEXT in the last 72 hours. Recent Labs     09/11/22  0405 09/10/22  0427 09/09/22  0219    137 136   K 3.3* 3.2* 3.5    100 98   CO2 22 25 30   BUN 65* 63* 64*   CREA 2.46* 2.50* 2.79*   GLU 80 77 105*   CA 8.6 8.4* 8.5   MG 1.8  --   --    PHOS 3.4 3.4 3.1       Recent Labs     09/11/22  0405 09/10/22  0427 09/09/22  0219   ALT 15 14  --    * 121*  --    TBILI 18.6* 17.3*  --    TP 4.6* 4.5*  --    ALB 3.2*  3.3* 3.4*  3.4* 3.7   GLOB 1.4* 1.1*  --        Recent Labs     09/11/22  0405 09/10/22  0427   INR 1.9*  --    PTP 19.2*  --    APTT  --  95.0*        No results for input(s): FE, TIBC, PSAT, FERR in the last 72 hours. Lab Results   Component Value Date/Time    Folate 14.7 08/30/2022 01:52 AM        No results for input(s): PH, PCO2, PO2 in the last 72 hours. No results for input(s): CPK, CKNDX, TROIQ in the last 72 hours.     No lab exists for component: CPKMB  No results found for: CHOL, CHOLX, CHLST, CHOLV, HDL, HDLP, LDL, LDLC, DLDLP, TGLX, TRIGL, TRIGP, CHHD, CHHDX  Lab Results   Component Value Date/Time    Glucose (POC) 121 (H) 09/10/2022 04:14 PM    Glucose (POC) 128 (H) 09/10/2022 11:23 AM    Glucose (POC) 145 (H) 09/06/2022 11:20 AM    Glucose (POC) 131 (H) 09/05/2022 11:55 PM    Glucose (POC) 171 (H) 09/03/2022 12:06 AM     Lab Results Component Value Date/Time    Color DARK YELLOW 09/04/2022 05:22 PM    Appearance TURBID (A) 09/04/2022 05:22 PM    Specific gravity 1.017 09/04/2022 05:22 PM    pH (UA) 5.5 09/04/2022 05:22 PM    Protein 100 (A) 09/04/2022 05:22 PM    Glucose Negative 09/04/2022 05:22 PM    Ketone Negative 09/04/2022 05:22 PM    Urobilinogen 1.0 09/04/2022 05:22 PM    Nitrites Positive (A) 09/04/2022 05:22 PM    Leukocyte Esterase LARGE (A) 09/04/2022 05:22 PM    Epithelial cells FEW 09/04/2022 05:22 PM    Bacteria 1+ (A) 09/04/2022 05:22 PM    WBC 5-10 09/04/2022 05:22 PM    RBC 5-10 09/04/2022 05:22 PM         Medications Reviewed:     Current Facility-Administered Medications   Medication Dose Route Frequency    magnesium oxide (MAG-OX) tablet 400 mg  400 mg Oral DAILY    potassium chloride SR (KLOR-CON 10) tablet 10 mEq  10 mEq Oral BID    nystatin (MYCOSTATIN) 100,000 unit/mL oral suspension 500,000 Units  500,000 Units Oral QID    [Held by provider] sodium bicarbonate (8.4%) 150 mEq in dextrose 5% 1,000 mL infusion   IntraVENous CONTINUOUS    lactulose (CHRONULAC) 10 gram/15 mL solution 30 mL  20 g Oral DAILY    midodrine (PROAMATINE) tablet 15 mg  15 mg Oral Q8H    octreotide (SANDOSTATIN) 500 mcg in 0.9% sodium chloride 500 mL infusion  50 mcg/hr IntraVENous CONTINUOUS    sodium bicarbonate tablet 650 mg  650 mg Oral BID    0.9% sodium chloride infusion 250 mL  250 mL IntraVENous PRN    [Held by provider] oxyCODONE IR (ROXICODONE) tablet 5 mg  5 mg Oral Q4H PRN    [Held by provider] cefTRIAXone (ROCEPHIN) 2 g in 0.9% sodium chloride 20 mL IV syringe  2 g IntraVENous Q24H    ARIPiprazole (ABILIFY) tablet 2 mg  2 mg Oral DAILY    buPROPion SR (WELLBUTRIN SR) tablet 150 mg  150 mg Oral DAILY    [Held by provider] LORazepam (ATIVAN) tablet 1 mg  1 mg Oral BID PRN    pantoprazole (PROTONIX) 40 mg in 0.9% sodium chloride 10 mL injection  40 mg IntraVENous ACB    sodium chloride (NS) flush 5-40 mL  5-40 mL IntraVENous Q8H sodium chloride (NS) flush 5-40 mL  5-40 mL IntraVENous PRN    acetaminophen (TYLENOL) tablet 650 mg  650 mg Oral Q6H PRN    Or    acetaminophen (TYLENOL) suppository 650 mg  650 mg Rectal Q6H PRN    polyethylene glycol (MIRALAX) packet 17 g  17 g Oral DAILY PRN    ondansetron (ZOFRAN ODT) tablet 4 mg  4 mg Oral Q8H PRN    Or    ondansetron (ZOFRAN) injection 4 mg  4 mg IntraVENous Q6H PRN    heparin (porcine) injection 5,000 Units  5,000 Units SubCUTAneous Q8H    L.acidophilus-paracasei-S.thermophil-bifidobacter (RISAQUAD) 8 billion cell capsule  1 Capsule Oral DAILY    nicotine (NICODERM CQ) 21 mg/24 hr patch 1 Patch  1 Patch TransDERmal DAILY     ______________________________________________________________________  EXPECTED LENGTH OF STAY: 4d 16h  ACTUAL LENGTH OF STAY:          12                 Celine Green MD

## 2022-09-13 LAB
ALBUMIN SERPL-MCNC: 3.1 G/DL (ref 3.5–5)
ANION GAP SERPL CALC-SCNC: 12 MMOL/L (ref 5–15)
BASOPHILS # BLD: 0.2 K/UL (ref 0–0.1)
BASOPHILS NFR BLD: 1 % (ref 0–1)
BUN SERPL-MCNC: 65 MG/DL (ref 6–20)
BUN/CREAT SERPL: 31 (ref 12–20)
CALCIUM SERPL-MCNC: 8.7 MG/DL (ref 8.5–10.1)
CHLORIDE SERPL-SCNC: 103 MMOL/L (ref 97–108)
CO2 SERPL-SCNC: 20 MMOL/L (ref 21–32)
CREAT SERPL-MCNC: 2.13 MG/DL (ref 0.55–1.02)
DIFFERENTIAL METHOD BLD: ABNORMAL
EOSINOPHIL # BLD: 0.4 K/UL (ref 0–0.4)
EOSINOPHIL NFR BLD: 2 % (ref 0–7)
ERYTHROCYTE [DISTWIDTH] IN BLOOD BY AUTOMATED COUNT: 21.3 % (ref 11.5–14.5)
GLUCOSE BLD STRIP.AUTO-MCNC: 119 MG/DL (ref 65–117)
GLUCOSE SERPL-MCNC: 94 MG/DL (ref 65–100)
HCT VFR BLD AUTO: 23 % (ref 35–47)
HGB BLD-MCNC: 8 G/DL (ref 11.5–16)
IMM GRANULOCYTES # BLD AUTO: 0.2 K/UL (ref 0–0.04)
IMM GRANULOCYTES NFR BLD AUTO: 1 % (ref 0–0.5)
LYMPHOCYTES # BLD: 2.1 K/UL (ref 0.8–3.5)
LYMPHOCYTES NFR BLD: 10 % (ref 12–49)
MCH RBC QN AUTO: 34.8 PG (ref 26–34)
MCHC RBC AUTO-ENTMCNC: 34.8 G/DL (ref 30–36.5)
MCV RBC AUTO: 100 FL (ref 80–99)
MONOCYTES # BLD: 1.9 K/UL (ref 0–1)
MONOCYTES NFR BLD: 9 % (ref 5–13)
NEUTS SEG # BLD: 16.5 K/UL (ref 1.8–8)
NEUTS SEG NFR BLD: 77 % (ref 32–75)
NRBC # BLD: 0 K/UL (ref 0–0.01)
NRBC BLD-RTO: 0 PER 100 WBC
PHOSPHATE SERPL-MCNC: 3 MG/DL (ref 2.6–4.7)
PLATELET # BLD AUTO: 171 K/UL (ref 150–400)
PMV BLD AUTO: 11.6 FL (ref 8.9–12.9)
POTASSIUM SERPL-SCNC: 3.9 MMOL/L (ref 3.5–5.1)
RBC # BLD AUTO: 2.3 M/UL (ref 3.8–5.2)
RBC MORPH BLD: ABNORMAL
SERVICE CMNT-IMP: ABNORMAL
SODIUM SERPL-SCNC: 135 MMOL/L (ref 136–145)
WBC # BLD AUTO: 21.3 K/UL (ref 3.6–11)

## 2022-09-13 PROCEDURE — 74011250637 HC RX REV CODE- 250/637: Performed by: HOSPITALIST

## 2022-09-13 PROCEDURE — 74011000250 HC RX REV CODE- 250: Performed by: INTERNAL MEDICINE

## 2022-09-13 PROCEDURE — 74011636637 HC RX REV CODE- 636/637: Performed by: INTERNAL MEDICINE

## 2022-09-13 PROCEDURE — 74011000250 HC RX REV CODE- 250: Performed by: FAMILY MEDICINE

## 2022-09-13 PROCEDURE — C9113 INJ PANTOPRAZOLE SODIUM, VIA: HCPCS | Performed by: INTERNAL MEDICINE

## 2022-09-13 PROCEDURE — 74011250636 HC RX REV CODE- 250/636: Performed by: INTERNAL MEDICINE

## 2022-09-13 PROCEDURE — 65270000046 HC RM TELEMETRY

## 2022-09-13 PROCEDURE — 74011250637 HC RX REV CODE- 250/637: Performed by: INTERNAL MEDICINE

## 2022-09-13 PROCEDURE — 99233 SBSQ HOSP IP/OBS HIGH 50: CPT | Performed by: INTERNAL MEDICINE

## 2022-09-13 PROCEDURE — 82962 GLUCOSE BLOOD TEST: CPT

## 2022-09-13 PROCEDURE — 85025 COMPLETE CBC W/AUTO DIFF WBC: CPT

## 2022-09-13 PROCEDURE — 80069 RENAL FUNCTION PANEL: CPT

## 2022-09-13 PROCEDURE — 36415 COLL VENOUS BLD VENIPUNCTURE: CPT

## 2022-09-13 RX ORDER — PANTOPRAZOLE SODIUM 40 MG/1
40 TABLET, DELAYED RELEASE ORAL
Status: DISCONTINUED | OUTPATIENT
Start: 2022-09-14 | End: 2022-09-15 | Stop reason: HOSPADM

## 2022-09-13 RX ORDER — LIDOCAINE 4 G/100G
1 PATCH TOPICAL EVERY 24 HOURS
Status: DISCONTINUED | OUTPATIENT
Start: 2022-09-13 | End: 2022-09-15 | Stop reason: HOSPADM

## 2022-09-13 RX ADMIN — PANTOPRAZOLE SODIUM 40 MG: 40 INJECTION, POWDER, FOR SOLUTION INTRAVENOUS at 07:00

## 2022-09-13 RX ADMIN — HEPARIN SODIUM 5000 UNITS: 5000 INJECTION INTRAVENOUS; SUBCUTANEOUS at 23:36

## 2022-09-13 RX ADMIN — Medication 400 MG: at 08:55

## 2022-09-13 RX ADMIN — POTASSIUM CHLORIDE 10 MEQ: 750 TABLET, FILM COATED, EXTENDED RELEASE ORAL at 17:23

## 2022-09-13 RX ADMIN — Medication 1 CAPSULE: at 08:55

## 2022-09-13 RX ADMIN — BUPROPION HYDROCHLORIDE 150 MG: 150 TABLET, EXTENDED RELEASE ORAL at 08:55

## 2022-09-13 RX ADMIN — ARIPIPRAZOLE 2 MG: 2 TABLET ORAL at 08:55

## 2022-09-13 RX ADMIN — SODIUM CHLORIDE, PRESERVATIVE FREE 10 ML: 5 INJECTION INTRAVENOUS at 21:55

## 2022-09-13 RX ADMIN — SODIUM BICARBONATE 650 MG: 650 TABLET ORAL at 17:23

## 2022-09-13 RX ADMIN — OCTREOTIDE ACETATE 50 MCG/HR: 500 INJECTION, SOLUTION INTRAVENOUS; SUBCUTANEOUS at 08:52

## 2022-09-13 RX ADMIN — SODIUM CHLORIDE, PRESERVATIVE FREE 10 ML: 5 INJECTION INTRAVENOUS at 13:33

## 2022-09-13 RX ADMIN — SODIUM BICARBONATE 650 MG: 650 TABLET ORAL at 08:55

## 2022-09-13 RX ADMIN — MIDODRINE HYDROCHLORIDE 15 MG: 5 TABLET ORAL at 13:35

## 2022-09-13 RX ADMIN — HEPARIN SODIUM 5000 UNITS: 5000 INJECTION INTRAVENOUS; SUBCUTANEOUS at 17:23

## 2022-09-13 RX ADMIN — POTASSIUM CHLORIDE 10 MEQ: 750 TABLET, FILM COATED, EXTENDED RELEASE ORAL at 08:55

## 2022-09-13 RX ADMIN — MIDODRINE HYDROCHLORIDE 15 MG: 5 TABLET ORAL at 21:55

## 2022-09-13 RX ADMIN — MIDODRINE HYDROCHLORIDE 15 MG: 5 TABLET ORAL at 07:00

## 2022-09-13 RX ADMIN — SODIUM CHLORIDE, PRESERVATIVE FREE 10 ML: 5 INJECTION INTRAVENOUS at 07:00

## 2022-09-13 NOTE — PROGRESS NOTES
0730: Bedside shift change report given to Nehemias Park and Inez Kennedy (oncoming nurse) by PASHA Owen (offgoing nurse). Report included the following information SBAR, MAR, and Recent Results. 1530: TRANSFER - OUT REPORT:    Verbal report given to PASHA Magaña (name) on Joss Mtz  being transferred to [address] [place] [address] (unit) for routine progression of care       Report consisted of patients Situation, Background, Assessment and   Recommendations(SBAR). Information from the following report(s) SBAR, MAR, and Recent Results was reviewed with the receiving nurse. Lines:   Peripheral IV 09/11/22 Left Antecubital (Active)   Site Assessment Other (Comment) 09/13/22 1505   Phlebitis Assessment 0 09/13/22 0845   Infiltration Assessment 0 09/13/22 0845   Dressing Status Clean, dry, & intact 09/13/22 0845   Dressing Type Transparent;Tape 09/13/22 0845   Hub Color/Line Status Blue;Capped 09/13/22 0845   Action Taken Open ports on tubing capped 09/13/22 0845   Alcohol Cap Used Yes 09/13/22 0845        Opportunity for questions and clarification was provided. Patient transported with:   [employer]    Charting and patient care of Joss Mtz by Jose Evans RN from 0730 to  was supervised and reviewed by this RN.

## 2022-09-13 NOTE — PROGRESS NOTES
1930 Bedside shift change report given to Mila Gomez (oncoming nurse) by Devyn Vicente (offgoing nurse). Report included the following information SBAR, Kardex, Intake/Output, MAR, Recent Results, and Cardiac Rhythm NSR . Sandostatin running at 50mcg/hr, per order. 0300 pt had small, soft, formed BM w/ red streaks. Blood and hemorrhoids noted at rectum. Unable to tell if bleeding is due to hemorrhoids. 0730 Bedside shift change report given to Sophie (oncoming nurse) by Mila Gomez (offgoing nurse). Report included the following information SBAR, Kardex, Intake/Output, MAR, Recent Results, and Cardiac Rhythm NSR . Problem: Falls - Risk of  Goal: *Absence of Falls  Description: Document Fern Bare Fall Risk and appropriate interventions in the flowsheet.   Outcome: Progressing Towards Goal  Note: Fall Risk Interventions:  Mobility Interventions: Assess mobility with egress test, Bed/chair exit alarm, Communicate number of staff needed for ambulation/transfer, OT consult for ADLs, Patient to call before getting OOB, PT Consult for mobility concerns, Utilize walker, cane, or other assistive device, Strengthening exercises (ROM-active/passive)    Mentation Interventions: Adequate sleep, hydration, pain control, Bed/chair exit alarm, Door open when patient unattended, Evaluate medications/consider consulting pharmacy, Increase mobility, More frequent rounding, Reorient patient, Room close to nurse's station, Toileting rounds, Update white board    Medication Interventions: Bed/chair exit alarm, Evaluate medications/consider consulting pharmacy, Patient to call before getting OOB, Teach patient to arise slowly    Elimination Interventions: Bed/chair exit alarm, Call light in reach, Patient to call for help with toileting needs, Stay With Me (per policy), Toileting schedule/hourly rounds    History of Falls Interventions: Bed/chair exit alarm, Consult care management for discharge planning, Door open when patient unattended, Evaluate medications/consider consulting pharmacy, Room close to nurse's station         Problem: Patient Education: Go to Patient Education Activity  Goal: Patient/Family Education  Outcome: Progressing Towards Goal     Problem: Pressure Injury - Risk of  Goal: *Prevention of pressure injury  Description: Document Chandana Scale and appropriate interventions in the flowsheet. Outcome: Progressing Towards Goal  Note: Pressure Injury Interventions:  Sensory Interventions: Assess changes in LOC, Assess need for specialty bed, Avoid rigorous massage over bony prominences, Check visual cues for pain, Discuss PT/OT consult with provider, Float heels, Keep linens dry and wrinkle-free, Maintain/enhance activity level, Minimize linen layers, Pressure redistribution bed/mattress (bed type), Turn and reposition approx. every two hours (pillows and wedges if needed)    Moisture Interventions: Absorbent underpads, Apply protective barrier, creams and emollients, Assess need for specialty bed, Check for incontinence Q2 hours and as needed, Limit adult briefs, Minimize layers    Activity Interventions: Assess need for specialty bed, Increase time out of bed, Pressure redistribution bed/mattress(bed type), PT/OT evaluation    Mobility Interventions: Assess need for specialty bed, Float heels, Pressure redistribution bed/mattress (bed type), PT/OT evaluation, Turn and reposition approx.  every two hours(pillow and wedges)    Nutrition Interventions: Document food/fluid/supplement intake, Discuss nutritional consult with provider    Friction and Shear Interventions: Apply protective barrier, creams and emollients, Foam dressings/transparent film/skin sealants, Lift sheet, Minimize layers                Problem: Patient Education: Go to Patient Education Activity  Goal: Patient/Family Education  Outcome: Progressing Towards Goal     Problem: Hypotension  Goal: *Blood pressure within specified parameters  Outcome: Progressing Towards Goal  Goal: *Fluid volume balance  Outcome: Progressing Towards Goal  Goal: *Labs within defined limits  Outcome: Progressing Towards Goal     Problem: Patient Education: Go to Patient Education Activity  Goal: Patient/Family Education  Outcome: Progressing Towards Goal     Problem: General Medical Care Plan  Goal: *Vital signs within specified parameters  Outcome: Progressing Towards Goal  Goal: *Labs within defined limits  Outcome: Progressing Towards Goal  Goal: *Absence of infection signs and symptoms  Outcome: Progressing Towards Goal  Goal: *Optimal pain control at patient's stated goal  Outcome: Progressing Towards Goal  Goal: *Skin integrity maintained  Outcome: Progressing Towards Goal  Goal: *Fluid volume balance  Outcome: Progressing Towards Goal  Goal: *Optimize nutritional status  Outcome: Progressing Towards Goal  Goal: *Anxiety reduced or absent  Outcome: Progressing Towards Goal  Goal: *Progressive mobility and function (eg: ADL's)  Outcome: Progressing Towards Goal     Problem: Patient Education: Go to Patient Education Activity  Goal: Patient/Family Education  Outcome: Progressing Towards Goal     Problem:  Body Temperature -  Risk of, Imbalanced  Goal: *Absence of cold stress or hypothermia signs and symptoms  Outcome: Progressing Towards Goal     Problem: Patient Education: Go to Patient Education Activity  Goal: Patient/Family Education  Outcome: Progressing Towards Goal

## 2022-09-13 NOTE — PROGRESS NOTES
Renal Progress Note    NAME:  Marcy Lin   :   1960   MRN:   [id number]     Date/Time:  2022   Subjective:        seen and examined, has no complaint, not oriented to place or time, had ascitis drain placed by IR, 3 lit drained sofar, 400 ml uo   more sleepy and less interactive, does not answer the questions. Oliguric, no significant change in cr. Possibly HRS. WBC still elevated. Has NGT on TF  9/3 awake, weak, denies pain, NGT is out and will be replaced. Had 600 ml urine/o, BP better, cr stable   more awake and alert, interactive today, wants to eat, , renal function improving, unable to place back the NGT, still significant leukocytosis. Denies abd pain, cough   awake and alert, denies pain, nausea, abd pain. On po diet now, refused TF. Renal function improving, borderline UO     sleeping, UO borderline 450ml, still draining>1.5 lit /day from ascitis, persistent leukocytosis, OFF Abx. Renal function improving slowly.  sitting in chair, feels ok, appetite better. Renal function improving   awake, denies complaints, low po intake,  ml, paredes has been removed. Renal function improving  22 --> F/U RANDY  9-10-22 awake, eating lunch, no complaints, seems lucid   no complaints, creatinine stable, not increasing, making urine, able to eat   eating breakfast, denies N/V/Abd pain, UO not recoreded.  Cr decreasing, k low  , feeling ok, no complaints,       Medications reviewed:  Current Facility-Administered Medications   Medication Dose Route Frequency    magnesium oxide (MAG-OX) tablet 400 mg  400 mg Oral DAILY    potassium chloride SR (KLOR-CON 10) tablet 10 mEq  10 mEq Oral BID    nystatin (MYCOSTATIN) 100,000 unit/mL oral suspension 500,000 Units  500,000 Units Oral QID    [Held by provider] sodium bicarbonate (8.4%) 150 mEq in dextrose 5% 1,000 mL infusion   IntraVENous CONTINUOUS    lactulose (CHRONULAC) 10 gram/15 mL solution 30 mL  20 g Oral DAILY midodrine (PROAMATINE) tablet 15 mg  15 mg Oral Q8H    octreotide (SANDOSTATIN) 500 mcg in 0.9% sodium chloride 500 mL infusion  50 mcg/hr IntraVENous CONTINUOUS    sodium bicarbonate tablet 650 mg  650 mg Oral BID    0.9% sodium chloride infusion 250 mL  250 mL IntraVENous PRN    [Held by provider] oxyCODONE IR (ROXICODONE) tablet 5 mg  5 mg Oral Q4H PRN    ARIPiprazole (ABILIFY) tablet 2 mg  2 mg Oral DAILY    buPROPion SR (WELLBUTRIN SR) tablet 150 mg  150 mg Oral DAILY    [Held by provider] LORazepam (ATIVAN) tablet 1 mg  1 mg Oral BID PRN    pantoprazole (PROTONIX) 40 mg in 0.9% sodium chloride 10 mL injection  40 mg IntraVENous ACB    sodium chloride (NS) flush 5-40 mL  5-40 mL IntraVENous Q8H    sodium chloride (NS) flush 5-40 mL  5-40 mL IntraVENous PRN    acetaminophen (TYLENOL) tablet 650 mg  650 mg Oral Q6H PRN    Or    acetaminophen (TYLENOL) suppository 650 mg  650 mg Rectal Q6H PRN    polyethylene glycol (MIRALAX) packet 17 g  17 g Oral DAILY PRN    ondansetron (ZOFRAN ODT) tablet 4 mg  4 mg Oral Q8H PRN    Or    ondansetron (ZOFRAN) injection 4 mg  4 mg IntraVENous Q6H PRN    heparin (porcine) injection 5,000 Units  5,000 Units SubCUTAneous Q8H    L.acidophilus-paracasei-S.thermophil-bifidobacter (RISAQUAD) 8 billion cell capsule  1 Capsule Oral DAILY    nicotine (NICODERM CQ) 21 mg/24 hr patch 1 Patch  1 Patch TransDERmal DAILY        Objective:   Vitals:  Visit Vitals  BP (!) 104/57   Pulse 68   Temp 97.8 °F (36.6 °C)   Resp 16   Ht 5' 3\" (1.6 m)   Wt 47.2 kg (104 lb 0.9 oz)   SpO2 96%   BMI 18.43 kg/m²     Temp (24hrs), Av.1 °F (36.7 °C), Min:97.8 °F (36.6 °C), Max:98.3 °F (36.8 °C)      O2 Device: None (Room air)    Last 24hr Input/Output:    Intake/Output Summary (Last 24 hours) at 2022 1025  Last data filed at 2022 0324  Gross per 24 hour   Intake 1220 ml   Output 2600 ml   Net -1380 ml          Physical Exam:    Seen in Room 464.     General: Chronically ill-looking lady , jaundiced, awake     HEENT:  sclerae icteric,    Neck: Supple    Lungs : Clear to auscultation, no wheezes, no rales    CVS: RRR, S1 S2 normal, No rub, no  LE edema    Abdomen: Ascites+, drain+, not tender    Extremities: No edema    Skin: jaundice    Neurologic: Awake and interacting    Lab Data Reviewed:    Recent Results (from the past 24 hour(s))   RENAL FUNCTION PANEL    Collection Time: 09/13/22  3:23 AM   Result Value Ref Range    Sodium 135 (L) 136 - 145 mmol/L    Potassium 3.9 3.5 - 5.1 mmol/L    Chloride 103 97 - 108 mmol/L    CO2 20 (L) 21 - 32 mmol/L    Anion gap 12 5 - 15 mmol/L    Glucose 94 65 - 100 mg/dL    BUN 65 (H) 6 - 20 MG/DL    Creatinine 2.13 (H) 0.55 - 1.02 MG/DL    BUN/Creatinine ratio 31 (H) 12 - 20      GFR est AA 29 (L) >60 ml/min/1.73m2    GFR est non-AA 24 (L) >60 ml/min/1.73m2    Calcium 8.7 8.5 - 10.1 MG/DL    Phosphorus 3.0 2.6 - 4.7 MG/DL    Albumin 3.1 (L) 3.5 - 5.0 g/dL   CBC WITH AUTOMATED DIFF    Collection Time: 09/13/22  3:23 AM   Result Value Ref Range    WBC 21.3 (H) 3.6 - 11.0 K/uL    RBC 2.30 (L) 3.80 - 5.20 M/uL    HGB 8.0 (L) 11.5 - 16.0 g/dL    HCT 23.0 (L) 35.0 - 47.0 %    .0 (H) 80.0 - 99.0 FL    MCH 34.8 (H) 26.0 - 34.0 PG    MCHC 34.8 30.0 - 36.5 g/dL    RDW 21.3 (H) 11.5 - 14.5 %    PLATELET 659 596 - 164 K/uL    MPV 11.6 8.9 - 12.9 FL    NRBC 0.0 0  WBC    ABSOLUTE NRBC 0.00 0.00 - 0.01 K/uL    NEUTROPHILS 77 (H) 32 - 75 %    LYMPHOCYTES 10 (L) 12 - 49 %    MONOCYTES 9 5 - 13 %    EOSINOPHILS 2 0 - 7 %    BASOPHILS 1 0 - 1 %    IMMATURE GRANULOCYTES 1 (H) 0.0 - 0.5 %    ABS. NEUTROPHILS 16.5 (H) 1.8 - 8.0 K/UL    ABS. LYMPHOCYTES 2.1 0.8 - 3.5 K/UL    ABS. MONOCYTES 1.9 (H) 0.0 - 1.0 K/UL    ABS. EOSINOPHILS 0.4 0.0 - 0.4 K/UL    ABS. BASOPHILS 0.2 (H) 0.0 - 0.1 K/UL    ABS. IMM.  GRANS. 0.2 (H) 0.00 - 0.04 K/UL    DF SMEAR SCANNED      RBC COMMENTS ANISOCYTOSIS  2+        RBC COMMENTS MACROCYTOSIS  1+        RBC COMMENTS CM CELLS  PRESENT RBC COMMENTS SCHISTOCYTES  PRESENT             Assessment/Plan:   Principal Problem:    Alcoholic cirrhosis of liver with ascites ([facility] (8/30/2022)    Active Problems:    Severe protein-calorie malnutrition ([facility] [address] (8/31/2022)     Acute kidney injury -cr 0.4 in early August,   -RANDY/possible ATN due to intravascular volume depletion in setting of poor po intake, hypotension /third spacing and possible sepsis/NSAIDs. Hepatorenal in DDx . Low FeNa-low urine output  Possible HRS. Not a candidate liver transplant Per Dr Katie Morrison. Not a candidate for RRT. -renal function improving  -c/w  po bicarb   -off tube feeding-nutritional support  -strict Is and Os  -avoid nephrotoxins, IV contrast/NSAIDs  -dose meds per GFR  - has persistent leukocytosis and worsening, ID consulted  -c/w with IV albumin, change to q12hr, octreotide and midodrine  -on octreotide day#12, will stop in 2 days       Hypotension-with h/o HTN on Coreg, due to effective intravascular volume depletion  -on prednisone per med list, but patient denies taking that.  -midodrine     Hyponatremia,resolved, -due to liver cirrhosis and volume depletion, chronic since early August  -improving with IVF  -Avoid rapid correction  -improved    Hypokalemia/hypophosphatemia, hypomagnesemia   -replete prn  -replete Mg    Alcoholic liver cirrhosis-decompensated. -Hypoalbuminemia/ascitis/coagulopathy-nl ammonia  -hepatology following-not a candidate for liver transplant.  Poor prognosis  - on lactulose    Anemia  -high iron stores  -Hb has dropped, might be due to hydration, r/o bleed  -transfuse for Hb<7    Persistent Leukocytosis: off Rocephin-source of infection? -manegaement per primary team    Hypoglycemia, resolved    DNR    Overall Poor prognosis      ___________________________________________________    Total time spent with patient:  [] 15   [] 25   [] 35   []  __ minutes    [] Critical Care Provided    Care Plan discussed with RN        [x] Patient   [] Family [] Care Manager   [] Consultant/Specialist :      []   >50% of visit spent in counseling and coordination of care   (Discussed [] CODE status,  [] Care Plan, [] D/C Planning)    ___________________________________________________    Attending Physician: Natalia Loving, [id number]

## 2022-09-13 NOTE — PROGRESS NOTES
[facility]  Hospitalist Group                                                                                          Hospitalist Progress Note  Weston Garcia MD  Answering service: [phone] OR [phone] from in house phone        Date of Service:  2022  NAME:  Benjamin Baker  :  1960  MRN:  [id number]      Admission Summary:   Per H&P, This is a 41-year-old woman with a past medical history significant for hypertension, alcoholic liver cirrhosis, who presented at the emergency room with abdominal pain. The patient presented at [facility] Emergency Room. When the patient arrived at the emergency room, the patient was found to have decompensation of liver cirrhosis. The hospitalist service at [facility] was asked to directly admit the patient to [facility], so that the patient can be seen by the hepatologist.  The patient came to the emergency room at [facility], because of lack of bed on the on the medical floor. When the patient arrived at the emergency room, the patient's lab work shows significant abnormality. She was subsequently referred to the hospitalist service for admission. No record of prior admission to this hospital.  It was reported that the patient underwent abdominal paracentesis before coming to [facility].  The amount of fluid removed is not known. The patient's abdominal pain is diffuse in location, dull ache, constant, 7/10 in severity with no known aggravating or relieving factors associated with nausea, but no vomiting, no diarrhea, and no constipation. Interval history / Subjective:   Wants to advance diet, states she isn't eating much because she doesn't like the food. Denies pain, dyspnea, n/v/d.      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 Serum ammonia was mildly elevated. Likely developed HE. Will continue lactulose at low dose with goal 2 bm's daily. Severe protein calorie malnutrition  Muscle wasting, bitemporal wasting. Due to her disease process  DHT advanced to duodenum 9/1 and resumed TFs - FT came out overnight, could not be replaced. Became more alert after lactulose so now on diet but PO intake remains poor. Refusing TF placement now. Discussed at length with her to at least finish her Ensures otherwise the FT has to be reinserted else she won't survive due to malnutrition. Acute kidney injury  Suspected ATN, IVVD due to poor PO intake, hypotension, NSAID use. HRS on ddx. No improvement with IV albumin but continue the same. IVF resumed 9/4 due to NGT being out. D/C bicarb drip today as she is developing an alkalosis. Renal function now improving. Hypotension  Continue midodrine, IV albumin (now off) and IV fluids as above    Anemia  Iron studies consistent with chronic disease  Transfuse PRN - unit ordered for 9/2  Vitamin K per hepatology for coagulopathy    Persistent leukocytosis: unclear etiology, no evidence of infection clinically. Afebrile. Has been on empiric IV ceftriaxone for 6 days, stopped 9/4. Sent UA and blood cultures. UA with yeast and bacteria, minimal pyuria, unlikely source of leukocytosis. Removed paredes 9/6. Follow-up blood cultures. Peritoneal fluid cell counts now c/w peritonitis. Consider chest/abd CT but utility is low given she can't get contrast due to renal failure, and clinically does not appear infected.     History of HTN    Hypothermia- Sandy mcintyre ordered    Code status: DNR - continue full current care otherwise but do not escalate (if pt rapidly deteriorates make comfort care and inform sister over the phone 24/7). See 9/2 PN for my discussion with her. Prophylaxis: SCD  Care Plan discussed with: RN     Hospital Problems  Date Reviewed: 8/30/2022            Codes Class Noted POA    Severe protein-calorie malnutrition ([facility] [address] (Chronic) ICD-10-CM: N58  ICD-9-CM: 448  8/31/2022 Yes        * (Principal) Alcoholic cirrhosis of liver with ascites ([facility] [address] ICD-10-CM: K70.31  ICD-9-CM: 571.2  8/30/2022 Yes       Review of Systems:   Review of systems not obtained due to patient factors. Vital Signs:    Last 24hrs VS reviewed since prior progress note.  Most recent are:  Visit Vitals  BP (!) 95/47 (BP 1 Location: Right upper arm, BP Patient Position: Sitting)   Pulse 76   Temp 98.1 °F (36.7 °C)   Resp 16   Ht 5' 3\" (1.6 m)   Wt 47.5 kg (104 lb 11.2 oz)   SpO2 100%   BMI 18.55 kg/m²     Patient Vitals for the past 24 hrs:   Temp Pulse Resp BP SpO2   09/12/22 2159 -- 76 -- -- --   09/12/22 2001 98.1 °F (36.7 °C) 74 16 (!) 95/47 100 %   09/12/22 1958 -- 71 -- -- --   09/12/22 1822 -- 75 -- -- --   09/12/22 1800 98.2 °F (36.8 °C) 74 18 (!) 128/92 --   09/12/22 1600 98.1 °F (36.7 °C) 68 18 (!) 111/51 99 %   09/12/22 1400 -- 76 -- -- --   09/12/22 1200 -- 64 17 (!) 95/43 --   09/12/22 1103 97.8 °F (36.6 °C) 71 20 (!) 108/52 98 %   09/12/22 1000 -- 74 -- -- --   09/12/22 0715 97.9 °F (36.6 °C) 64 13 (!) 93/27 98 %   09/12/22 0600 -- 66 -- -- --   09/12/22 0400 98 °F (36.7 °C) 65 16 (!) 98/46 98 %   09/12/22 0200 -- 70 -- -- --   09/11/22 2300 98.2 °F (36.8 °C) 70 17 (!) 107/52 99 %         Intake/Output Summary (Last 24 hours) at 9/12/2022 2248  Last data filed at 9/12/2022 2001  Gross per 24 hour   Intake 1200.83 ml   Output 2020 ml   Net -819.17 ml          Physical Examination:     I had a face to face encounter with this patient and independently examined them on 9/12/2022 as outlined below:          Constitutional:  NAD, cachectic, chronically-ill appearing   ENT:  Oral mucosa moist, Icteric sclerae    Resp:  CTA bilaterally. No wheezing/rhonchi/rales. No accessory muscle use. CV:  Regular rhythm, normal rate, soft systolic murmur     GI: Soft, more distended today, NT, bs+    Musculoskeletal:  No edema, warm, 2+     Neurologic:  Grossly non-focal, alert currently but intermittently drowsy  Psych: not anxious nor agitated     Skin: jaundiced       Data Review:    Review and/or order of clinical lab test  Review and/or order of tests in the radiology section of [facility]      Labs:     No results for input(s): WBC, HGB, HCT, PLT, HGBEXT, HCTEXT, PLTEXT, HGBEXT, HCTEXT, PLTEXT in the last 72 hours. Recent Labs     09/12/22  0623 09/11/22  0405 09/10/22  0427   * 136 137   K 3.4* 3.3* 3.2*    101 100   CO2 23 22 25   BUN 63* 65* 63*   CREA 2.21* 2.46* 2.50*   GLU 86 80 77   CA 8.7 8.6 8.4*   MG  --  1.8  --    PHOS 3.2 3.4 3.4       Recent Labs     09/12/22  0623 09/11/22  0405 09/10/22  0427   ALT  --  15 14   AP  --  154* 121*   TBILI  --  18.6* 17.3*   TP  --  4.6* 4.5*   ALB 2.9* 3.2*  3.3* 3.4*  3.4*   GLOB  --  1.4* 1.1*       Recent Labs     09/11/22  0405 09/10/22  0427   INR 1.9*  --    PTP 19.2*  --    APTT  --  95.0*        No results for input(s): FE, TIBC, PSAT, FERR in the last 72 hours. Lab Results   Component Value Date/Time    Folate 14.7 08/30/2022 01:52 AM        No results for input(s): PH, PCO2, PO2 in the last 72 hours. No results for input(s): CPK, CKNDX, TROIQ in the last 72 hours.     No lab exists for component: CPKMB  No results found for: CHOL, CHOLX, CHLST, CHOLV, HDL, HDLP, LDL, LDLC, DLDLP, TGLX, TRIGL, TRIGP, CHHD, CHHDX  Lab Results   Component Value Date/Time    Glucose (POC) 121 (H) 09/10/2022 04:14 PM    Glucose (POC) 128 (H) 09/10/2022 11:23 AM    Glucose (POC) 145 (H) 09/06/2022 11:20 AM    Glucose (POC) 131 (H) 09/05/2022 11:55 PM    Glucose (POC) 171 (H) 09/03/2022 12:06 AM     Lab Results   Component Value Date/Time    Color DARK YELLOW 09/04/2022 05:22 PM    Appearance TURBID (A) 09/04/2022 05:22 PM    Specific gravity 1.017 09/04/2022 05:22 PM    pH (UA) 5.5 09/04/2022 05:22 PM    Protein 100 (A) 09/04/2022 05:22 PM    Glucose Negative 09/04/2022 05:22 PM    Ketone Negative 09/04/2022 05:22 PM    Urobilinogen 1.0 09/04/2022 05:22 PM    Nitrites Positive (A) 09/04/2022 05:22 PM    Leukocyte Esterase LARGE (A) 09/04/2022 05:22 PM    Epithelial cells FEW 09/04/2022 05:22 PM    Bacteria 1+ (A) 09/04/2022 05:22 PM    WBC 5-10 09/04/2022 05:22 PM    RBC 5-10 09/04/2022 05:22 PM         Medications Reviewed:     Current Facility-Administered Medications   Medication Dose Route Frequency    magnesium oxide (MAG-OX) tablet 400 mg  400 mg Oral DAILY    potassium chloride SR (KLOR-CON 10) tablet 10 mEq  10 mEq Oral BID    nystatin (MYCOSTATIN) 100,000 unit/mL oral suspension 500,000 Units  500,000 Units Oral QID    [Held by provider] sodium bicarbonate (8.4%) 150 mEq in dextrose 5% 1,000 mL infusion   IntraVENous CONTINUOUS    lactulose (CHRONULAC) 10 gram/15 mL solution 30 mL  20 g Oral DAILY    midodrine (PROAMATINE) tablet 15 mg  15 mg Oral Q8H    octreotide (SANDOSTATIN) 500 mcg in 0.9% sodium chloride 500 mL infusion  50 mcg/hr IntraVENous CONTINUOUS    sodium bicarbonate tablet 650 mg  650 mg Oral BID    0.9% sodium chloride infusion 250 mL  250 mL IntraVENous PRN    [Held by provider] oxyCODONE IR (ROXICODONE) tablet 5 mg  5 mg Oral Q4H PRN    ARIPiprazole (ABILIFY) tablet 2 mg  2 mg Oral DAILY    buPROPion SR (WELLBUTRIN SR) tablet 150 mg  150 mg Oral DAILY    [Held by provider] LORazepam (ATIVAN) tablet 1 mg  1 mg Oral BID PRN    pantoprazole (PROTONIX) 40 mg in 0.9% sodium chloride 10 mL injection  40 mg IntraVENous ACB    sodium chloride (NS) flush 5-40 mL  5-40 mL IntraVENous Q8H    sodium chloride (NS) flush 5-40 mL  5-40 mL IntraVENous PRN    acetaminophen (TYLENOL) tablet 650 mg  650 mg Oral Q6H PRN    Or    acetaminophen (TYLENOL) suppository 650 mg  650 mg Rectal Q6H PRN polyethylene glycol (MIRALAX) packet 17 g  17 g Oral DAILY PRN    ondansetron (ZOFRAN ODT) tablet 4 mg  4 mg Oral Q8H PRN    Or    ondansetron (ZOFRAN) injection 4 mg  4 mg IntraVENous Q6H PRN    heparin (porcine) injection 5,000 Units  5,000 Units SubCUTAneous Q8H    L.acidophilus-paracasei-S.thermophil-bifidobacter (RISAQUAD) 8 billion cell capsule  1 Capsule Oral DAILY    nicotine (NICODERM CQ) 21 mg/24 hr patch 1 Patch  1 Patch TransDERmal DAILY     ______________________________________________________________________  EXPECTED LENGTH OF STAY: 4d 16h  ACTUAL LENGTH OF STAY:          13                 Arnaldo Angeles MD

## 2022-09-13 NOTE — PROGRESS NOTES
For Nephro:    Output inaccurate. Patient is voiding 3-4x per dayshift today. One void was caught in bedside commode 275cc; other voids similar volume.

## 2022-09-13 NOTE — PROGRESS NOTES
0730 Bedside shift change report given to cristine (oncoming nurse) by Evelia Cabral (offgoing nurse). Report included the following information SBAR, Kardex, ED Summary, Intake/Output, MAR, and Recent Results. 1530 TRANSFER - OUT REPORT:    Verbal report given to Georgina(name) on Verónica Rogers  being transferred to (unit) for routine progression of care       Report consisted of patients Situation, Background, Assessment and   Recommendations(SBAR). Information from the following report(s) SBAR, Kardex, ED Summary, Intake/Output, MAR, and Recent Results was reviewed with the receiving nurse. Lines:   Peripheral IV 09/11/22 Left Antecubital (Active)   Site Assessment Other (Comment) 09/13/22 1505   Phlebitis Assessment 0 09/13/22 0845   Infiltration Assessment 0 09/13/22 0845   Dressing Status Clean, dry, & intact 09/13/22 0845   Dressing Type Transparent;Tape 09/13/22 0845   Hub Color/Line Status Blue;Capped 09/13/22 0845   Action Taken Open ports on tubing capped 09/13/22 0845   Alcohol Cap Used Yes 09/13/22 0845        Opportunity for questions and clarification was provided.       Patient transported with:   [employer]

## 2022-09-13 NOTE — PROGRESS NOTES
Clinical Pharmacy Note: IV to PO Automatic Conversion  Please note: [facility] protonix has been changed from IV to PO based on the following critiera:    Patient is tolerating oral medications  Patient is tolerating a diet more advanced than clear liquids  Patient is not requiring vasopressors    This IV to PO conversion is based on the [employer] approved automatic conversion policy for eligible patients. Please call with questions.

## 2022-09-14 ENCOUNTER — ANESTHESIA (OUTPATIENT)
Dept: ENDOSCOPY | Age: 62
DRG: 280 | End: 2022-09-14
Payer: MEDICAID

## 2022-09-14 ENCOUNTER — ANESTHESIA EVENT (OUTPATIENT)
Dept: ENDOSCOPY | Age: 62
DRG: 280 | End: 2022-09-14
Payer: MEDICAID

## 2022-09-14 LAB
ALBUMIN SERPL-MCNC: 3.3 G/DL (ref 3.5–5)
ANION GAP SERPL CALC-SCNC: 10 MMOL/L (ref 5–15)
BUN SERPL-MCNC: 65 MG/DL (ref 6–20)
BUN/CREAT SERPL: 31 (ref 12–20)
CALCIUM SERPL-MCNC: 9.7 MG/DL (ref 8.5–10.1)
CHLORIDE SERPL-SCNC: 103 MMOL/L (ref 97–108)
CO2 SERPL-SCNC: 19 MMOL/L (ref 21–32)
CREAT SERPL-MCNC: 2.12 MG/DL (ref 0.55–1.02)
GLUCOSE SERPL-MCNC: 81 MG/DL (ref 65–100)
PHOSPHATE SERPL-MCNC: 3.3 MG/DL (ref 2.6–4.7)
POTASSIUM SERPL-SCNC: 4.5 MMOL/L (ref 3.5–5.1)
SODIUM SERPL-SCNC: 132 MMOL/L (ref 136–145)

## 2022-09-14 PROCEDURE — 2709999900 HC NON-CHARGEABLE SUPPLY: Performed by: INTERNAL MEDICINE

## 2022-09-14 PROCEDURE — 65270000046 HC RM TELEMETRY

## 2022-09-14 PROCEDURE — 76060000031 HC ANESTHESIA FIRST 0.5 HR: Performed by: INTERNAL MEDICINE

## 2022-09-14 PROCEDURE — 74011250637 HC RX REV CODE- 250/637: Performed by: INTERNAL MEDICINE

## 2022-09-14 PROCEDURE — 74011000250 HC RX REV CODE- 250: Performed by: FAMILY MEDICINE

## 2022-09-14 PROCEDURE — 76040000019: Performed by: INTERNAL MEDICINE

## 2022-09-14 PROCEDURE — 74011250636 HC RX REV CODE- 250/636: Performed by: INTERNAL MEDICINE

## 2022-09-14 PROCEDURE — 74011250636 HC RX REV CODE- 250/636: Performed by: NURSE ANESTHETIST, CERTIFIED REGISTERED

## 2022-09-14 PROCEDURE — 94760 N-INVAS EAR/PLS OXIMETRY 1: CPT

## 2022-09-14 PROCEDURE — 74011000250 HC RX REV CODE- 250: Performed by: INTERNAL MEDICINE

## 2022-09-14 PROCEDURE — 74011250637 HC RX REV CODE- 250/637: Performed by: FAMILY MEDICINE

## 2022-09-14 PROCEDURE — 99232 SBSQ HOSP IP/OBS MODERATE 35: CPT | Performed by: INTERNAL MEDICINE

## 2022-09-14 PROCEDURE — 97535 SELF CARE MNGMENT TRAINING: CPT

## 2022-09-14 PROCEDURE — 80069 RENAL FUNCTION PANEL: CPT

## 2022-09-14 PROCEDURE — 43235 EGD DIAGNOSTIC BRUSH WASH: CPT | Performed by: INTERNAL MEDICINE

## 2022-09-14 PROCEDURE — 36415 COLL VENOUS BLD VENIPUNCTURE: CPT

## 2022-09-14 PROCEDURE — 0DJ08ZZ INSPECTION OF UPPER INTESTINAL TRACT, VIA NATURAL OR ARTIFICIAL OPENING ENDOSCOPIC: ICD-10-PCS | Performed by: INTERNAL MEDICINE

## 2022-09-14 RX ORDER — PROPOFOL 10 MG/ML
INJECTION, EMULSION INTRAVENOUS AS NEEDED
Status: DISCONTINUED | OUTPATIENT
Start: 2022-09-14 | End: 2022-09-14 | Stop reason: HOSPADM

## 2022-09-14 RX ORDER — DEXTROMETHORPHAN/PSEUDOEPHED 2.5-7.5/.8
1.2 DROPS ORAL
Status: DISCONTINUED | OUTPATIENT
Start: 2022-09-14 | End: 2022-09-14 | Stop reason: HOSPADM

## 2022-09-14 RX ORDER — SODIUM CHLORIDE 0.9 % (FLUSH) 0.9 %
5-40 SYRINGE (ML) INJECTION AS NEEDED
Status: DISCONTINUED | OUTPATIENT
Start: 2022-09-14 | End: 2022-09-15 | Stop reason: HOSPADM

## 2022-09-14 RX ORDER — SODIUM CHLORIDE 9 MG/ML
INJECTION, SOLUTION INTRAVENOUS
Status: DISCONTINUED | OUTPATIENT
Start: 2022-09-14 | End: 2022-09-14 | Stop reason: HOSPADM

## 2022-09-14 RX ORDER — FENTANYL CITRATE 50 UG/ML
50-200 INJECTION, SOLUTION INTRAMUSCULAR; INTRAVENOUS
Status: DISCONTINUED | OUTPATIENT
Start: 2022-09-14 | End: 2022-09-14 | Stop reason: HOSPADM

## 2022-09-14 RX ORDER — EPINEPHRINE 0.1 MG/ML
1 INJECTION INTRACARDIAC; INTRAVENOUS
Status: DISCONTINUED | OUTPATIENT
Start: 2022-09-14 | End: 2022-09-14 | Stop reason: HOSPADM

## 2022-09-14 RX ORDER — SODIUM CHLORIDE 9 MG/ML
50 INJECTION, SOLUTION INTRAVENOUS CONTINUOUS
Status: DISPENSED | OUTPATIENT
Start: 2022-09-14 | End: 2022-09-14

## 2022-09-14 RX ORDER — ATROPINE SULFATE 0.1 MG/ML
0.5 INJECTION INTRAVENOUS
Status: DISCONTINUED | OUTPATIENT
Start: 2022-09-14 | End: 2022-09-14 | Stop reason: HOSPADM

## 2022-09-14 RX ORDER — FLUMAZENIL 0.1 MG/ML
0.2 INJECTION INTRAVENOUS
Status: DISCONTINUED | OUTPATIENT
Start: 2022-09-14 | End: 2022-09-14 | Stop reason: HOSPADM

## 2022-09-14 RX ORDER — SODIUM CHLORIDE 0.9 % (FLUSH) 0.9 %
5-40 SYRINGE (ML) INJECTION EVERY 8 HOURS
Status: DISCONTINUED | OUTPATIENT
Start: 2022-09-14 | End: 2022-09-15 | Stop reason: HOSPADM

## 2022-09-14 RX ADMIN — SODIUM CHLORIDE, PRESERVATIVE FREE 10 ML: 5 INJECTION INTRAVENOUS at 21:13

## 2022-09-14 RX ADMIN — PROPOFOL 25 MG: 10 INJECTION, EMULSION INTRAVENOUS at 15:11

## 2022-09-14 RX ADMIN — BUPROPION HYDROCHLORIDE 150 MG: 150 TABLET, EXTENDED RELEASE ORAL at 09:16

## 2022-09-14 RX ADMIN — SODIUM CHLORIDE: 900 INJECTION, SOLUTION INTRAVENOUS at 15:00

## 2022-09-14 RX ADMIN — SODIUM BICARBONATE 650 MG: 650 TABLET ORAL at 17:09

## 2022-09-14 RX ADMIN — MIDODRINE HYDROCHLORIDE 15 MG: 5 TABLET ORAL at 17:09

## 2022-09-14 RX ADMIN — Medication 400 MG: at 09:16

## 2022-09-14 RX ADMIN — MIDODRINE HYDROCHLORIDE 15 MG: 5 TABLET ORAL at 21:12

## 2022-09-14 RX ADMIN — PROPOFOL 25 MG: 10 INJECTION, EMULSION INTRAVENOUS at 15:10

## 2022-09-14 RX ADMIN — SODIUM CHLORIDE, PRESERVATIVE FREE 10 ML: 5 INJECTION INTRAVENOUS at 17:23

## 2022-09-14 RX ADMIN — HEPARIN SODIUM 5000 UNITS: 5000 INJECTION INTRAVENOUS; SUBCUTANEOUS at 17:10

## 2022-09-14 RX ADMIN — PANTOPRAZOLE SODIUM 40 MG: 40 TABLET, DELAYED RELEASE ORAL at 06:49

## 2022-09-14 RX ADMIN — HEPARIN SODIUM 5000 UNITS: 5000 INJECTION INTRAVENOUS; SUBCUTANEOUS at 06:49

## 2022-09-14 RX ADMIN — MIDODRINE HYDROCHLORIDE 15 MG: 5 TABLET ORAL at 06:48

## 2022-09-14 RX ADMIN — PROPOFOL 25 MG: 10 INJECTION, EMULSION INTRAVENOUS at 15:12

## 2022-09-14 RX ADMIN — ARIPIPRAZOLE 2 MG: 2 TABLET ORAL at 09:22

## 2022-09-14 RX ADMIN — POTASSIUM CHLORIDE 10 MEQ: 750 TABLET, FILM COATED, EXTENDED RELEASE ORAL at 17:09

## 2022-09-14 RX ADMIN — SODIUM BICARBONATE 650 MG: 650 TABLET ORAL at 09:16

## 2022-09-14 RX ADMIN — SODIUM CHLORIDE, PRESERVATIVE FREE 10 ML: 5 INJECTION INTRAVENOUS at 06:58

## 2022-09-14 NOTE — PROGRESS NOTES
Nela Chairez MD, FACP, [facility] [place] [place], [place]      EDITH Rae, [facility]     April S Bridger, [facility]   ERIC Martin, [facility]       Eleonora [facility] [address]    at [facility]    [address], [address] [place]  22.    [phone]    FAX: [address] [place] [address] Drive, [address], [address]    [phone]    FAX: [phone]       HEPATOLOGY PROGRESS NOTE  The patient is a 65 y.o.  female with a multi-decade history of consuming 1 bottle of wine per night. She was told by per medical provider in the past to cut back on alcohol use. She developed jaundice and scleral icterus 12 days ago. She has developed weakness and is unable to walk. She lives alone and has no family in the area. This is her first ever hospitalization for alcohol related illness. In the ED Laboratory studies were significant for:    WBC 29.9, HB 9.8 gms, , Sna 129, Scr 4.3 mg, AST 91, ALT 35, , TBILI 29.7 mg, INR 1.8, Sammonia 11,     Imaging of the liver with CT scan demonstrated cirrhosis with a large volume of ascites. I spoke with sister who is in [facility] and plans to visit next week. 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 She is too muscle wasted and has RANDY  Ascites continues to drain about 1400 cc/day. Scr is coming down from 4 to 2.13 mg.    U/O remains good     Feeding tube was placed  and she was getting tube feedings.   She removed the feeding tube and said she can eat on her own. Continues to be very alert, mostly oriented and can carry on conversation. Getting PT and can now walk to door with assistance. Alcoholic hepatitis is getting better. DF is now 46. EGD today showed no esophageal varices. Hepatology Plan:  All ascites has resolved and now that SCr coming down and marking urine can remove paracentesis catheter. Alcohol hepatitis is better but still has DF 51. I would not give steroids. Will eventually need to discharge to SNF/rehab    ASSESSMENT AND PLAN:  Cirrhosis  The diagnosis of cirrhosis is based upon imaging, laboratory studies, complications of cirrhosis. Cirrhosis is secondary to alcohol. The CTP is 10. Child class C. The MELD score is down from 39 to 35. Based upon the MELD and CTP scores the patient has a mortality of about 50% within the next 90 days   She is not a candidate for LT at this time because of severe malnutrition and advanced muscle wasting and no social support system    Alcohol liver disease  Suspect the patient has alcohol induced liver disease based upon a history of consuming significant alcohol on a daily basis for many years, pattern of AST>ALT,   Serologies for other causes of chronic liver disease were ordered. Ascites   Ascites developed for the first time in 8/2022. Paracentesis continues to drain >1L per day. Acute kidney injury  The patient has developed acute kidney injury with SCr 4 mg on admission. After several days of IV albumin, octreotide and midodrine SCr started to come down. Scr was down to 2.3 mg but now up to 2.1 mg  Continue midodrine    Screening for Esophageal varices   The patient has not had an EGD to screen for varices. Will perform EGD at some point during the hospitalization. Not bleeding. Not top priority. Risk of sedation given severe malnutrition and muscle wasting is considerable. Plan to perform EGD Thursday. NPO after MN on Wednesday.     Hepatic encephalopathy Overt HE has not developed to date. There is no reason for treatment with lactulose of xifaxan  There is no reason to give her diarrhea. There is no need to restrict dietary protein at this time. Coagulopathy  This is secondary to cirrhosis, malnutrition,   Will treat with 3 doses of Vitamin K over 3 days. There is no evidence of bleeding. No need for FFP at this time. Malnutrition/muscle wasting  The patient has severe protein-calorie malnutrition with severe muscle wasting of the upper extremities, lower extremities, facial muscles  Malnutrition and muscle wasting is due to Alcohol abuse and poor caloric intake, cirrhosis and poor caloric utilization, refractory ascites and inability to eat sufficient calories,     The patient needs tube feedings. It is impossible for her to get any significant amount of calories by eating   She pulled out feeding tube and refuses to have this replaced. She clearly understands importance of nutritional support      PHYSICAL EXAMINATION:  VS: per nursing note  General:  Ill appearing  Eyes:  Sclera deeply icteric  ENT:  No oral lesions. Thyroid normal.  Nodes:  No adenopathy. Skin:  Numerous spider angiomata. Jaundice. Respiratory:  Lungs clear to auscultation. Cardiovascular:  Regular heart rate. Abdomen:  Distended with obvious ascites. Extremities:  No lower extremity edema. Severe muscle wasting. Neurologic:  Alert and oriented. Cranial nerves grossly intact. No asterixis.     LABORATORY:   Latest Reference Range & Units 9/11/22 04:05 9/12/22 06:23 9/13/22 03:23   WBC 3.6 - 11.0 K/uL   21.3 (H)   HGB 11.5 - 16.0 g/dL   8.0 (L)   PLATELET 219 - 792 K/uL   171   INR 0.9 - 1.1   1.9 (H)     Sodium 136 - 145 mmol/L 136 134 (L) 135 (L)   Potassium 3.5 - 5.1 mmol/L 3.3 (L) 3.4 (L) 3.9   Chloride 97 - 108 mmol/L 101 102 103   CO2 21 - 32 mmol/L 22 23 20 (L)   Glucose 65 - 100 mg/dL 80 86 94   BUN 6 - 20 MG/DL 65 (H) 63 (H) 65 (H)   Creatinine 0.55 - 1.02 MG/DL 2.46 (H) 2.21 (H) 2.13 (H)   Bilirubin, total 0.2 - 1.0 MG/DL 18.6 (H)     Albumin 3.5 - 5.0 g/dL 3.2 (L)  3.3 (L) 2.9 (L) 3.1 (L)   ALT 12 - 78 U/L 15     AST 15 - 37 U/L 57 (H)     Alk.  phosphatase 45 - 117 U/L 154 (H)     (H): Data is abnormally high  (L): Data is abnormally low      MD Arleth Chiang 13 of [address], [address] [facility] 22  201 [facility]

## 2022-09-14 NOTE — ANESTHESIA POSTPROCEDURE EVALUATION
Procedure(s):  ESOPHAGOGASTRODUODENOSCOPY (EGD). MAC    Anesthesia Post Evaluation        Patient location during evaluation: PACU  Note status: Adequate. Level of consciousness: responsive to verbal stimuli and sleepy but conscious  Pain management: satisfactory to patient  Airway patency: patent  Anesthetic complications: no  Cardiovascular status: acceptable  Respiratory status: acceptable  Hydration status: acceptable  Comments: +Post-Anesthesia Evaluation and Assessment    Patient: eVrónica Rogers MRN: [id number]  SSN: [id number]   YOB: 1960  Age: 65 y.o. Sex: female          Cardiovascular Function/Vital Signs    BP (!) 86/51   Pulse 63   Temp 36.2 °C (97.1 °F)   Resp 14   Ht 5' 3\" (1.6 m)   Wt 47.2 kg (104 lb 0.9 oz)   SpO2 100%   BMI 18.43 kg/m²     Patient is status post Procedure(s):  ESOPHAGOGASTRODUODENOSCOPY (EGD). Nausea/Vomiting: Controlled. Postoperative hydration reviewed and adequate. Pain:  Pain Scale 1: Numeric (0 - 10) (09/14/22 1540)  Pain Intensity 1: 0 (09/14/22 1540)   Managed. Neurological Status: At baseline. Mental Status and Level of Consciousness: Arousable. Pulmonary Status:   O2 Device: None (Room air) (09/14/22 1540)   Adequate oxygenation and airway patent. Complications related to anesthesia: None    Post-anesthesia assessment completed. No concerns. I have evaluated the patient and the patient is stable and ready to be discharged from PACU . Signed By: Maribel Sexton MD    9/14/2022        INITIAL Post-op Vital signs:   Vitals Value Taken Time   BP 89/66 09/14/22 1545   Temp 36.2 °C (97.1 °F) 09/14/22 1519   Pulse 59 09/14/22 1600   Resp 13 09/14/22 1600   SpO2 100 % 09/14/22 1558   Vitals shown include unvalidated device data.

## 2022-09-14 NOTE — PROCEDURES
[address] [facility]\"", MD, FACP, [place], [place]      EDITH Billings ACNP-BC     April S Bridger, [facility]-BC   ERIC [facility]       [facility]etty [facility]UN[facility] [place] [address]    at [address], [address] [place]  22.    [phone]    FAX: [address] [place] [address], [address]    [phone]    FAX: [phone]         UPPER ENDOSCOPY PROCEDURE NOTE    Phyllis Shaw  1960    INDICATION: Cirrhosis. Screening for esophageal varices with variceal ligation if  indicated. : Enrique Gee MD    SURGICAL ASSISTANT:  None    PROSTHETIC DEVISES, TISSUE GRAFTS, ORGAN TRANSPLANTS:  Not applicable     ANESTHESIA/SEDATION: MAC Sedation per anesthesiology          PROCEDURE DESCRIPTION:  [employer] consent was obtained from the patient for the procedure. All risks and benefits of the procedure explained. The procedure was performed in the endoscopy suite. The patient was laying on a stretcher and moved to the left lateral decubitus position prior to administration of sedation. Sedation was administered by anesthesiology. See their note for details. The endoscope was inserted into the mouth and advanced under direct vision to the second portion of the duodenum. Careful inspection of upper gastrointestinal tract was made as the endoscope was inserted and withdrawn. Retroflexion of the endoscope to view of the cardia of the stomach was performed. The findings and interventions are described below. FINDINGS:  Esophagus:    Normal.      Stomach:   Mild portal hypertensive gastropathy of the body of the stomach  No gastric varices identified.     Duodenum:   Normal bulb and second portion    SPECIMENS COLLECTED:   None    INTERVENTIONS:  None    COMPLICATIONS: None. The patient tolerated the procedure well. EBL: Negligible. RECOMMENDATIONS:  Observe until discharge parameters are achieved. May resume previous diet.       Enrique Gee MD  [address]  Sammie Bo [facility] City[place]  22.  [phone]  FAX:  [address] [place]

## 2022-09-14 NOTE — PROGRESS NOTES
TRANSFER - IN REPORT:    Verbal report received from [facility] on Feroz Manners  being received from [address] [place] [address] for routine progression of care      Report consisted of patients Situation, Background, Assessment and   Recommendations(SBAR). Information from the following report(s) SBAR, Kardex, Intake/Output, and MAR was reviewed with the receiving nurse. Opportunity for questions and clarification was provided. Assessment completed upon patients arrival to unit and care assumed. No physical paper copy of durable DNR is in paper chart or in media; made primary Michael Frances aware who will message hospitalist regarding this. When asked, pt wishes to be full code. Initial RN admission and assessment performed and documented in Endoscopy navigator. Patient evaluated by anesthesia in pre-procedure holding. All procedural vital signs, airway assessment, and level of consciousness information monitored and recorded by anesthesia staff on the anesthesia record. Report received from CRNA post procedure. Patient transported to recovery area by RN. Endoscope was pre-cleaned at bedside immediately following procedure by Steven Alas. Recovery room report given to [facility] [address] - OUT REPORT:    Verbal report given to [facility] on Feroz Manners  being transferred to [address] [place] [address] for routine post - op       Report consisted of patients Situation, Background, Assessment and   Recommendations(SBAR). Information from the following report(s) SBAR and Procedure Summary was reviewed with the receiving nurse.     Lines:   Peripheral IV 09/11/22 Left Antecubital (Active)   Site Assessment Clean, dry, & intact 09/14/22 0906   Phlebitis Assessment 0 09/14/22 0906   Infiltration Assessment 0 09/14/22 0906   Dressing Status Clean, dry, & intact 09/14/22 0906   Dressing Type Transparent;Tape 09/14/22 0906   Hub Color/Line Status Blue;Capped 09/14/22 6585   Action Taken Open ports on tubing capped 09/14/22 0906 Alcohol Cap Used Yes 09/14/22 0906        Opportunity for questions and clarification was provided.       Patient transported with:   [employer] [facility]

## 2022-09-14 NOTE — PROGRESS NOTES
Comprehensive Nutrition Assessment    Type and Reason for Visit: Reassess    Nutrition Recommendations/Plan:   Continue with Regular diet order  Continue with Ensure Enlive and Ensure Clear ONS  Recommend ordering daily MVI, thiamine, folic acid       Malnutrition Assessment:  Malnutrition Status:  Severe malnutrition (08/31/22 1425)    Context:  Chronic illness     Findings of the 6 clinical characteristics of malnutrition:   Energy Intake:  75% or less est energy requirements for 1 month or longer  Weight Loss:  Unable to assess     Body Fat Loss:  Severe body fat loss, Triceps, Orbital, Buccal region   Muscle Mass Loss:  Severe muscle mass loss, Temples (temporalis), Clavicles (pectoralis &deltoids), Hand (interosseous)  Fluid Accumulation:  Severe, Ascites   Strength:  Not performed        Nutrition Assessment:    66 yo female admitted for ETOH cirrhosis with ascites. Pmhx: HTN, ETOH cirrhosis. Nephrology following for high BUN/Cr. MD consult received for TF rec's. Hepatologist notes severe malnutrition and inability to meet her needs with PO intakes. Spoke with pt at bedside. She is jaundiced and intermittently confused. Severe muscle and fat wasting present. States she usually eats 2x/day at home. Provided a UBW of 177 lbs but could not tell me when she weighed that. Based on her current weight of 112 lbs, this would indicate a 36% loss. Pt with large volume ascites. Noted to have received paracentesis PTA but amount not documented. TF Rec's: Jevity 1.5 at goal rate of 60 ml/hr + 130 ml h20 flush q4h to provide 1320 ml, 1980 kcals (100% kcal needs), 84 gm protein (91% protein needs), 1003 + 780 = 1783 ml water. Currently NPO. Will monitor for diet order and adjust TF as needed (possible change to bolus or nocturnal). Labs reviewed. 9/7:  Follow up. Paracentesis drain placed. TF previously running via NGT until pt pulled the tube out on 9/3 and refused to have it replaced. Hepatologist recommending replacing it with a bridle to prevent her from pulling it out again. Diet was advanced to Easy to Chew and Ensure Enlive ordered TID. Spoke with pt at bedside. She reports not eating well. Student nurse in room who states all she had for breakfast this morning was the Ensure. Pt asking for \"big girl food\" referencing the fact that she is on easy to chew diet. Encouraged increased PO intakes to meet her nutrition needs since she is refusing NGT. Requesting chocolate flavor ensure (receiving vanilla). Discussed additional ONS options, pt agreeable to try Ensure Clear as she likes juice. Weight is down 1.6 kg from assessment 1 week ago. Labs: K+ 3.4, phos 1.5, Mg 1.5, BUN/Cr elevated (nephrology following)    9/14:  Follow up. Paracentesis drain putting out 1400 ml daily. Pt NPO today for EGD. Spoke with pt at bedside. She states she was eating 3/4 of her meals yesterday which is a lot for her. Ensure Enlive and Clear ordered. Pt appeared confused when asking about her the two different Ensures. Claiming that she is still not receiving chocolate flavor which is what she prefers. Pt makes it sound like she is not drinking them but intakes documented in chart stating she is drinking some of them. Continues to refuse NGT placement for nutrition support. Labs: reviewed.        Nutritionally Significant Medications:  Probiotic, Lactulose, Mag-ox, Kcl      Estimated Daily Nutrient Needs:  Energy Requirements Based On: Kcal/kg  Weight Used for Energy Requirements: Current  Energy (kcal/day): 2553-6398 (35-40 kcals/kg)  Weight Used for Protein Requirements: Current  Protein (g/day):  (1.8-2 gm/kg)  Method Used for Fluid Requirements: 1 ml/kcal  Fluid (ml/day): 1800    Nutrition Related Findings:   Edema: ascites    Last BM: 09/13/22, Loose    Wounds: None      Current Nutrition Therapies:  Diet: Regular  Supplements: Ensure Enlive, Ensure Clear  Meal intake: Patient Vitals for the past 168 hrs:   % Diet Eaten   09/13/22 1200 1 - 25%   09/13/22 0845 51 - 75%   09/10/22 1200 26 - 50%   09/10/22 0800 26 - 50%   09/09/22 1200 51 - 75%   09/09/22 0900 26 - 50%   09/08/22 2015 0%   09/08/22 0900 26 - 50%   09/07/22 1800 1 - 25%   09/07/22 1200 1 - 25%   09/07/22 0959 1 - 25%     Supplement intake: Patient Vitals for the past 168 hrs:   Supplement intake %   09/09/22 1200 26 - 50%   09/07/22 1800 26 - 50%   09/07/22 1200 26 - 50%   09/07/22 0959 76 - 100%     Nutrition Support: none      Anthropometric Measures:  Height: 5' 3\" (160 cm)  Ideal Body Weight (IBW): 115 lbs (52 kg)     Current Body Wt:  47.2 kg (104 lb 0.9 oz), 90.5 % IBW.  Standing scale  Current BMI (kg/m2): 18.4        Weight Adjustment: No adjustment                 BMI Category: Normal weight (BMI 22.0-24.9) age over 72    Wt Readings from Last 10 Encounters:   09/13/22 47.2 kg (104 lb 0.9 oz)           Nutrition Diagnosis:   Severe malnutrition related to inadequate protein-energy intake as evidenced by severe muscle loss, severe loss of subcutaneous fat, poor intake prior to admission    Nutrition Interventions:   Food and/or Nutrient Delivery: Continue current diet, Continue oral nutrition supplement  Nutrition Education/Counseling: No recommendations at this time  Coordination of Nutrition Care: Continue to monitor while inpatient       Goals:  Previous Goal Met: Progressing toward goal(s)  Goals: other (specify)  Specify Other Goals: PO intakes > 75% meals + ONS over next 5-7 days    Nutrition Monitoring and Evaluation:   Behavioral-Environmental Outcomes: Readiness for change  Food/Nutrient Intake Outcomes: Food and nutrient intake, Supplement intake  Physical Signs/Symptoms Outcomes: Biochemical data, GI status, Weight    Discharge Planning:    Continue current diet, Continue oral nutrition supplement    Sharon Magdaleno RD  Available via [employer]

## 2022-09-14 NOTE — PROGRESS NOTES
Transitions of care:  Pt accepted by [address], phone [phone], [address] [address], Lisandro Lomas [address] and is expected to leave tomorrow 9/15 at 11am.    CM confirmed acceptance at [address] [facility],[address] of [address] [place] [address] in admissions confirmed bed on unit 2 will be ready tomorrow. UAI has already been completed and sent to facility. CM requested transport for 9/15 at 1100 through [address] [employer]. REF #: [id number].     Branden Coats 52   [phone]

## 2022-09-14 NOTE — PROGRESS NOTES
[facility] \[facility] [place] [facility]\"", MD, FACP, Ayesha Fernandez, EDITH Hillman, [facility]     April S Bridger, [facility]   ERIC Walsh, [facility]       Eleonora Edgar Arley De Srinivasan 136    at [facility]    [address], [address] [facility], [place]  22.    [phone]    FAX: [address] [place] [address] Drive, [address], [address]    [phone]    FAX: [phone]       HEPATOLOGY PROGRESS NOTE  The patient is a 65 y.o.  female with a multi-decade history of consuming 1 bottle of wine per night. She was told by per medical provider in the past to cut back on alcohol use. She developed jaundice and scleral icterus 12 days ago. She has developed weakness and is unable to walk. She lives alone and has no family in the area. This is her first ever hospitalization for alcohol related illness. In the ED Laboratory studies were significant for:    WBC 29.9, HB 9.8 gms, , Sna 129, Scr 4.3 mg, AST 91, ALT 35, , TBILI 29.7 mg, INR 1.8, Sammonia 11,     Imaging of the liver with CT scan demonstrated cirrhosis with a large volume of ascites. I spoke with sister who is in [facility] and plans to visit next week. 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 She is too muscle wasted and has RANDY  Ascites continues to drain about 1400 cc/day. Scr is coming down from 4 to 2.13 mg.    U/O remains good     Feeding tube was placed  and she was getting tube feedings.   She removed the feeding tube and said she can eat on her own. Continues to be very alert, mostly oriented and can carry on conversation. Getting PT and can now walk to door with assistance. Alcoholic hepatitis is getting better. DF is now 46. Hepatology Plan:  All ascites has resolved and now that SCr coming down and marking urine can remove paracentesis catheter. Alcohol hepatitis is better but still has DF 51. I would not give steroids. Will eventually need to discharge to SNF/rehab    ASSESSMENT AND PLAN:  Cirrhosis  The diagnosis of cirrhosis is based upon imaging, laboratory studies, complications of cirrhosis. Cirrhosis is secondary to alcohol. The CTP is 10. Child class C. The MELD score is down from 39 to 35. Based upon the MELD and CTP scores the patient has a mortality of about 50% within the next 90 days   She is not a candidate for LT at this time because of severe malnutrition and advanced muscle wasting and no social support system    Alcohol liver disease  Suspect the patient has alcohol induced liver disease based upon a history of consuming significant alcohol on a daily basis for many years, pattern of AST>ALT,   Serologies for other causes of chronic liver disease were ordered. Ascites   Ascites developed for the first time in 8/2022. Paracentesis continues to drain >1L per day. Acute kidney injury  The patient has developed acute kidney injury with SCr 4 mg on admission. After several days of IV albumin, octreotide and midodrine SCr started to come down. Scr was down to 2.3 mg but now up to 2.1 mg  Continue midodrine    Screening for Esophageal varices   The patient has not had an EGD to screen for varices. Will perform EGD at some point during the hospitalization. Not bleeding. Not top priority. Risk of sedation given severe malnutrition and muscle wasting is considerable. Plan to perform EGD Thursday. NPO after MN on Wednesday. Hepatic encephalopathy   Overt HE has not developed to date. There is no reason for treatment with lactulose of xifaxan  There is no reason to give her diarrhea. There is no need to restrict dietary protein at this time. Coagulopathy  This is secondary to cirrhosis, malnutrition,   Will treat with 3 doses of Vitamin K over 3 days. There is no evidence of bleeding. No need for FFP at this time. Malnutrition/muscle wasting  The patient has severe protein-calorie malnutrition with severe muscle wasting of the upper extremities, lower extremities, facial muscles  Malnutrition and muscle wasting is due to Alcohol abuse and poor caloric intake, cirrhosis and poor caloric utilization, refractory ascites and inability to eat sufficient calories,     The patient needs tube feedings. It is impossible for her to get any significant amount of calories by eating   She pulled out feeding tube and refuses to have this replaced. She clearly understands importance of nutritional support      PHYSICAL EXAMINATION:  VS: per nursing note  General:  Ill appearing  Eyes:  Sclera deeply icteric  ENT:  No oral lesions. Thyroid normal.  Nodes:  No adenopathy. Skin:  Numerous spider angiomata. Jaundice. Respiratory:  Lungs clear to auscultation. Cardiovascular:  Regular heart rate. Abdomen:  Distended with obvious ascites. Extremities:  No lower extremity edema. Severe muscle wasting. Neurologic:  Alert and oriented. Cranial nerves grossly intact. No asterixis.     LABORATORY:   Latest Reference Range & Units 9/11/22 04:05 9/12/22 06:23 9/13/22 03:23   WBC 3.6 - 11.0 K/uL   21.3 (H)   HGB 11.5 - 16.0 g/dL   8.0 (L)   PLATELET 694 - 573 K/uL   171   INR 0.9 - 1.1   1.9 (H)     Sodium 136 - 145 mmol/L 136 134 (L) 135 (L)   Potassium 3.5 - 5.1 mmol/L 3.3 (L) 3.4 (L) 3.9   Chloride 97 - 108 mmol/L 101 102 103   CO2 21 - 32 mmol/L 22 23 20 (L)   Glucose 65 - 100 mg/dL 80 86 94   BUN 6 - 20 MG/DL 65 (H) 63 (H) 65 (H)   Creatinine 0.55 - 1.02 MG/DL 2.46 (H) 2.21 (H) 2.13 (H) Bilirubin, total 0.2 - 1.0 MG/DL 18.6 (H)     Albumin 3.5 - 5.0 g/dL 3.2 (L)  3.3 (L) 2.9 (L) 3.1 (L)   ALT 12 - 78 U/L 15     AST 15 - 37 U/L 57 (H)     Alk.  phosphatase 45 - 117 U/L 154 (H)     (H): Data is abnormally high  (L): Data is abnormally low      Stacia Ch MD  [place] [address], [address] [place] [address] 22.  [address] [facility]

## 2022-09-14 NOTE — PROGRESS NOTES
Problem: Self Care Deficits Care Plan (Adult)  Goal: *Acute Goals and Plan of Care (Insert Text)  Description: FUNCTIONAL STATUS PRIOR TO ADMISSION: At time of evaluation pt is a questionable historian. Patient was modified independent using a walker and single point cane for functional mobility. HOME SUPPORT: The patient lived alone with her friend to provide assistance as needed. Occupational Therapy Goals  Initiated 9/1/2022: goals downgraded 9/8/22  1. Patient will perform standing grooming routine with supervision/set-up within 7 day(s). (Sitting EOB 9/8/22)  2. Patient will perform seated upper and lower body bathing with supervision/set-up within 7 day(s). (Min A 9/8/22)  3. Patient will perform upper and lower body dressing with supervision/set-up within 7 day(s). (Min A 9/8/22)  4. Patient will perform toilet transfers with supervision/set-up within 7 day(s). (Min A 9/8/22)  5. Patient will perform all aspects of toileting with supervision/set-up within 7 day(s). (Min A 9/8/22)  6. Patient will participate in upper extremity therapeutic exercise/activities with supervision/set-up for 5 minutes within 7 day(s). (Continue 9/8/22)      Outcome: Progressing Towards Goal    OCCUPATIONAL THERAPY TREATMENT  Patient: Allie Mitchell (89 y.o. female)  Date: 9/14/2022  Diagnosis: Alcoholic cirrhosis of liver with ascites (HCC) [B63.14] Alcoholic cirrhosis of liver with ascites ([facility]  Procedure(s) (LRB):  ESOPHAGOGASTRODUODENOSCOPY (EGD) (N/A)    Precautions: Fall  Chart, occupational therapy assessment, plan of care, and goals were reviewed. ASSESSMENT  Patient continues with skilled OT services and is progressing towards goals. Pt alert and agreeable to therapy this date, supine in bed upon arrival. Pt completed supine to sit with SBA, was able to laterally scoot at EOB with SBA. Pt able to stand with CGA and transfer to chair requesting to complete grooming seated in chair for a rest break.  In the chair, completed tooth brushing with set up. Pt then transferred to standing with SBA to complete ADLs at table with set up and reports of fatigue after finishing ADL. Pt returned to sitting in chair where she completed LB dressing with set up. BP soft throughout session but no dizziness noted, RN aware. Pt remains good candidate for SNF or LTC due to [facility] for functional mobility and set up for ADL completion. 09/14/22 0928 09/14/22 0947 09/14/22 0951   Vital Signs   Pulse (Heart Rate) 66 70  --    /70 (!) 98/51 (!) 100/51   MAP (Calculated) 88 67 67   BP 1 Location Right upper arm Right upper arm Right upper arm   BP 1 Method Automatic Automatic Automatic   BP Patient Position Semi fowlers Sitting Sitting   O2 Sat (%) 98 % 98 %  --    O2 Device None (Room air) None (Room air)  --       09/14/22 0954   Vital Signs   Pulse (Heart Rate) 72   BP (!) 98/50   MAP (Calculated) 66   BP 1 Location Right upper arm   BP 1 Method Automatic   BP Patient Position Reclining   O2 Sat (%)  --    O2 Device  --        Current Level of Function Impacting Discharge (ADLs): SBA-CGA for functional mobility and set up for ADL completion    Other factors to consider for discharge: requiring less assistance than prior sessions, bladder management dependence         PLAN :  Patient continues to benefit from skilled intervention to address the above impairments. Continue treatment per established plan of care to address goals. Recommend with staff: OOB for ADLs and meals    Recommend next OT session: Standing ADLs    Recommendation for discharge: (in order for the patient to meet his/her long term goals)  Therapy up to 5 days/week in SNF setting, if not then LTC    This discharge recommendation:  A follow-up discussion with the attending provider and/or case management is planned    IF patient discharges home will need the following DME: TBD       SUBJECTIVE:   Patient stated It feels good to do this.     OBJECTIVE DATA SUMMARY:   Cognitive/Behavioral Status:  Neurologic State: Alert  Orientation Level: Oriented X4  Cognition: Appropriate decision making; Follows commands; Appropriate for age attention/concentration  Perception: Appears intact  Perseveration: No perseveration noted  Safety/Judgement: Awareness of environment    Functional Mobility and Transfers for ADLs:  Bed Mobility:  Supine to Sit: Stand-by assistance  Scooting: Stand-by assistance    Transfers:  Sit to Stand: Contact guard assistance          Balance:  Sitting: Intact  Sitting - Static: Good (unsupported)  Sitting - Dynamic: Good (unsupported)  Standing: Impaired; Without support  Standing - Static: Good  Standing - Dynamic : Fair;Unsupported    ADL Intervention:       Grooming  Grooming Assistance: Set-up  Position Performed: Seated in chair;Standing  Washing Face: Stand-by assistance  Brushing Teeth: Set-up  Brushing/Combing Hair: Stand-by assistance         Type of Bath: Chlorhexidine (CHG)              Lower Body Dressing Assistance  Dressing Assistance: Set-up  Socks: Set-up  Position Performed: Seated in chair         Cognitive Retraining  Safety/Judgement: Awareness of environment      Pain:  No pain reported    Activity Tolerance:   Good, SpO2 stable on RA, and requires rest breaks    After treatment patient left in no apparent distress:   Sitting in chair, Call bell within reach, and Bed / chair alarm activated    COMMUNICATION/COLLABORATION:   The patients plan of care was discussed with: Registered nurse. LESLIE Marquez  Time Calculation: 43 mins      Regarding student involvement in patient care:  A student participated in this treatment session. Per CMS Medicare statements and AOTA guidelines I certify that the following was true:  1. I was present and directly observed the entire session. 2. I made all skilled judgments and clinical decisions regarding care.   3. I am the practitioner responsible for assessment, treatment, and documentation.

## 2022-09-14 NOTE — PROGRESS NOTES
[address] [facility] Adult  Hospitalist Group                                                                                          Hospitalist Progress Note  Moises Rai MD  Answering service: [phone] or [phone] from in house phone        Date of Service:  2022  NAME:  Natasha Ortiz  :  1960  MRN:  [id number]      Admission Summary:   Per H&P, This is a 81-year-old woman with a past medical history significant for hypertension, alcoholic liver cirrhosis, who presented at the emergency room with abdominal pain. The patient presented at [facility] Emergency Room. When the patient arrived at the emergency room, the patient was found to have decompensation of liver cirrhosis. The hospitalist service at [facility] was asked to directly admit the patient to [facility], so that the patient can be seen by the hepatologist.  The patient came to the emergency room at [facility], because of lack of bed on the on the medical floor. When the patient arrived at the emergency room, the patient's lab work shows significant abnormality. She was subsequently referred to the hospitalist service for admission. No record of prior admission to this hospital.  It was reported that the patient underwent abdominal paracentesis before coming to [facility].  The amount of fluid removed is not known. The patient's abdominal pain is diffuse in location, dull ache, constant, 7/10 in severity with no known aggravating or relieving factors associated with nausea, but no vomiting, no diarrhea, and no constipation. Interval history / Subjective:   Patient without complaints  Denies pain, dyspnea, n/v/d.      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 Serum ammonia was mildly elevated. Likely developed HE. Will continue lactulose at low dose with goal 2 bm's daily. Severe protein calorie malnutrition  Muscle wasting, bitemporal wasting. Due to her disease process  DHT advanced to duodenum 9/1 and resumed TFs - FT came out overnight 9/6/22 could not be replaced. Became more alert after lactulose so now on diet but PO intake remains poor. Refusing TF placement now. Discussed at length with her to at least finish her Ensures otherwise the FT has to be reinserted else she won't survive due to malnutrition. Acute kidney injury  Suspected ATN, IVVD due to poor PO intake, hypotension, NSAID use. HRS on ddx. No improvement with IV albumin but continue the same. IVF/ bicarb drip tdiscontinued- renal function stable    Hypotension- stable  Continue midodrine, now off IV albumin and IV fluids     Anemia  Iron studies consistent with chronic disease  Transfuse PRN - unit ordered for 9/2  Vitamin K per hepatology for coagulopathy    Persistent leukocytosis: unclear etiology, no evidence of infection clinically. Afebrile. Has been on empiric IV ceftriaxone for 6 days, stopped 9/4. Sent UA and blood cultures. UA with yeast and bacteria, minimal pyuria, unlikely source of leukocytosis. Removed paredes 9/6. Follow-up blood cultures. Peritoneal fluid cell counts now c/w peritonitis. Consider chest/abd CT but utility is low given she can't get contrast due to renal failure, and clinically does not appear infected. History of HTN    Hypothermia- Sandy mcintyre used 9/12/22- temp normal today    Code status: DNR - continue full current care otherwise but do not escalate (if pt rapidly deteriorates make comfort care and inform sister over the phone 24/7).    Prophylaxis: SCD  Care Plan discussed with: [facility] Problems  Date Reviewed: 8/30/2022            Codes Class Noted POA    Severe protein-calorie malnutrition ([facility] (Chronic) ICD-10-CM: B53  ICD-9-CM: 607  8/31/2022 Yes        * (Principal) Alcoholic cirrhosis of liver with ascites ([facility] ICD-10-CM: K70.31  ICD-9-CM: 571.2  8/30/2022 Yes       Review of Systems:   Review of systems not obtained due to patient factors. Vital Signs:    Last 24hrs VS reviewed since prior progress note. Most recent are:  Visit Vitals  /79 (BP 1 Location: Left lower arm, BP Patient Position: Lying)   Pulse 65   Temp 98 °F (36.7 °C)   Resp 14   Ht 5' 3\" (1.6 m)   Wt 47.2 kg (104 lb 0.9 oz)   SpO2 97%   BMI 18.43 kg/m²     Patient Vitals for the past 24 hrs:   Temp Pulse Resp BP SpO2   09/13/22 2211 -- 65 -- -- --   09/13/22 2008 -- 64 -- -- --   09/13/22 2000 98 °F (36.7 °C) 65 14 121/79 97 %   09/13/22 1609 97.8 °F (36.6 °C) 80 16 (!) 98/59 96 %   09/13/22 1505 98 °F (36.7 °C) 72 16 (!) 99/51 100 %   09/13/22 1400 -- 65 -- -- --   09/13/22 1200 97.6 °F (36.4 °C) 75 -- (!) 94/52 97 %   09/13/22 1000 -- 68 -- -- --   09/13/22 0700 97.8 °F (36.6 °C) 73 16 (!) 104/57 96 %   09/13/22 0555 -- 71 -- -- --   09/13/22 0402 -- 67 -- -- --   09/13/22 0324 98.1 °F (36.7 °C) 73 16 (!) 98/46 99 %   09/13/22 0155 -- 68 -- -- --   09/12/22 2331 98.3 °F (36.8 °C) 69 16 (!) 101/47 100 %         Intake/Output Summary (Last 24 hours) at 9/13/2022 2253  Last data filed at 9/13/2022 1505  Gross per 24 hour   Intake 1279.17 ml   Output 2400 ml   Net -1120.83 ml          Physical Examination:     I had a face to face encounter with this patient and independently examined them on 9/13/2022 as outlined below:          Constitutional:  NAD, cachectic, chronically-ill appearing   ENT:  Oral mucosa moist, Icteric sclerae    Resp:  CTA bilaterally. No wheezing/rhonchi/rales. No accessory muscle use.     CV:  Regular rhythm, normal rate, soft systolic murmur     GI: Soft, more distended today, NT, bs+    Musculoskeletal:  No edema, warm, 2+     Neurologic:  Grossly non-focal, alert currently but intermittently drowsy  Psych: not anxious nor agitated     Skin: jaundiced       Data Review:    Review and/or order of clinical lab test  Review and/or order of tests in the radiology section of [facility]      Labs:     Recent Labs     09/13/22 0323   WBC 21.3*   HGB 8.0*   HCT 23.0*          Recent Labs     09/13/22 0323 09/12/22 0623 09/11/22  0405   * 134* 136   K 3.9 3.4* 3.3*    102 101   CO2 20* 23 22   BUN 65* 63* 65*   CREA 2.13* 2.21* 2.46*   GLU 94 86 80   CA 8.7 8.7 8.6   MG  --   --  1.8   PHOS 3.0 3.2 3.4       Recent Labs     09/13/22 0323 09/12/22 0623 09/11/22  0405   ALT  --   --  15   AP  --   --  154*   TBILI  --   --  18.6*   TP  --   --  4.6*   ALB 3.1* 2.9* 3.2*  3.3*   GLOB  --   --  1.4*       Recent Labs     09/11/22  0405   INR 1.9*   PTP 19.2*        No results for input(s): FE, TIBC, PSAT, FERR in the last 72 hours. Lab Results   Component Value Date/Time    Folate 14.7 08/30/2022 01:52 AM        No results for input(s): PH, PCO2, PO2 in the last 72 hours. No results for input(s): CPK, CKNDX, TROIQ in the last 72 hours.     No lab exists for component: CPKMB  No results found for: CHOL, CHOLX, CHLST, CHOLV, HDL, HDLP, LDL, LDLC, DLDLP, TGLX, TRIGL, TRIGP, CHHD, CHHDX  Lab Results   Component Value Date/Time    Glucose (POC) 119 (H) 09/13/2022 11:26 AM    Glucose (POC) 121 (H) 09/10/2022 04:14 PM    Glucose (POC) 128 (H) 09/10/2022 11:23 AM    Glucose (POC) 145 (H) 09/06/2022 11:20 AM    Glucose (POC) 131 (H) 09/05/2022 11:55 PM     Lab Results   Component Value Date/Time    Color DARK YELLOW 09/04/2022 05:22 PM    Appearance TURBID (A) 09/04/2022 05:22 PM    Specific gravity 1.017 09/04/2022 05:22 PM    pH (UA) 5.5 09/04/2022 05:22 PM    Protein 100 (A) 09/04/2022 05:22 PM    Glucose Negative 09/04/2022 05:22 PM    Ketone Negative 09/04/2022 05:22 PM    Urobilinogen 1.0 09/04/2022 05:22 PM    Nitrites Positive (A) 09/04/2022 05:22 PM    Leukocyte Esterase LARGE (A) 09/04/2022 05:22 PM    Epithelial cells FEW 09/04/2022 05:22 PM    Bacteria 1+ (A) 09/04/2022 05:22 PM    WBC 5-10 09/04/2022 05:22 PM    RBC 5-10 09/04/2022 05:22 PM         Medications Reviewed:     Current Facility-Administered Medications   Medication Dose Route Frequency    [START ON 9/14/2022] pantoprazole (PROTONIX) tablet 40 mg  40 mg Oral ACB    lidocaine 4 % patch 1 Patch  1 Patch TransDERmal Q24H    magnesium oxide (MAG-OX) tablet 400 mg  400 mg Oral DAILY    potassium chloride SR (KLOR-CON 10) tablet 10 mEq  10 mEq Oral BID    nystatin (MYCOSTATIN) 100,000 unit/mL oral suspension 500,000 Units  500,000 Units Oral QID    lactulose (CHRONULAC) 10 gram/15 mL solution 30 mL  20 g Oral DAILY    midodrine (PROAMATINE) tablet 15 mg  15 mg Oral Q8H    sodium bicarbonate tablet 650 mg  650 mg Oral BID    0.9% sodium chloride infusion 250 mL  250 mL IntraVENous PRN    [Held by provider] oxyCODONE IR (ROXICODONE) tablet 5 mg  5 mg Oral Q4H PRN    ARIPiprazole (ABILIFY) tablet 2 mg  2 mg Oral DAILY    buPROPion SR (WELLBUTRIN SR) tablet 150 mg  150 mg Oral DAILY    [Held by provider] LORazepam (ATIVAN) tablet 1 mg  1 mg Oral BID PRN    sodium chloride (NS) flush 5-40 mL  5-40 mL IntraVENous Q8H    sodium chloride (NS) flush 5-40 mL  5-40 mL IntraVENous PRN    acetaminophen (TYLENOL) tablet 650 mg  650 mg Oral Q6H PRN    Or    acetaminophen (TYLENOL) suppository 650 mg  650 mg Rectal Q6H PRN    polyethylene glycol (MIRALAX) packet 17 g  17 g Oral DAILY PRN    ondansetron (ZOFRAN ODT) tablet 4 mg  4 mg Oral Q8H PRN    Or    ondansetron (ZOFRAN) injection 4 mg  4 mg IntraVENous Q6H PRN    heparin (porcine) injection 5,000 Units  5,000 Units SubCUTAneous Q8H    L.acidophilus-paracasei-S.thermophil-bifidobacter (RISAQUAD) 8 billion cell capsule  1 Capsule Oral DAILY    nicotine (NICODERM CQ) 21 mg/24 hr patch 1 Patch  1 Patch TransDERmal DAILY ______________________________________________________________________  EXPECTED LENGTH OF STAY: 4d 16h  ACTUAL LENGTH OF STAY:          Pascual Vasquez MD

## 2022-09-14 NOTE — PROGRESS NOTES
Chart reviewed and cleared by RN for pt tx. Entered room responding to bed alarm and discovered pt attempting to get OOB stating that she was late for her appointment. In the process of returning pt to bed, sonia arrived to take pt off floor for procedure. Will defer at this time and follow up as able and appropriate.     Yudith Mera PT, DPT

## 2022-09-14 NOTE — ANESTHESIA PREPROCEDURE EVALUATION
Relevant Problems   GASTROINTESTINAL   (+) Alcoholic cirrhosis of liver with ascites (HCC)       Anesthetic History   No history of anesthetic complications            Review of Systems / Medical History  Patient summary reviewed, nursing notes reviewed and pertinent labs reviewed    Pulmonary  Within defined limits                 Neuro/Psych   Within defined limits           Cardiovascular  Within defined limits                Exercise tolerance: <4 METS     GI/Hepatic/Renal  Within defined limits         Liver disease     Endo/Other  Within defined limits      Anemia     Other Findings   Comments: Alcoholic cirrhosis of liver with ascites (HCC)  Severe protein-calorie malnutrition              Physical Exam    Airway  Mallampati: II  TM Distance: > 6 cm  Neck ROM: normal range of motion   Mouth opening: Normal     Cardiovascular  Regular rate and rhythm,  S1 and S2 normal,  no murmur, click, rub, or gallop             Dental  No notable dental hx       Pulmonary  Breath sounds clear to auscultation               Abdominal  GI exam deferred  Ascites and distended     Other Findings            Anesthetic Plan    ASA: 3  Anesthesia type: MAC          Induction: Intravenous  Anesthetic plan and risks discussed with: Patient

## 2022-09-14 NOTE — PROGRESS NOTES
Renal Progress Note    NAME:  Manny Collado   :   1960   MRN:   [id number]     Date/Time:  2022   Subjective:        seen and examined, has no complaint, not oriented to place or time, had ascitis drain placed by IR, 3 lit drained sofar, 400 ml uo   more sleepy and less interactive, does not answer the questions. Oliguric, no significant change in cr. Possibly HRS. WBC still elevated. Has NGT on TF  9/3 awake, weak, denies pain, NGT is out and will be replaced. Had 600 ml urine/o, BP better, cr stable   more awake and alert, interactive today, wants to eat, , renal function improving, unable to place back the NGT, still significant leukocytosis. Denies abd pain, cough   awake and alert, denies pain, nausea, abd pain. On po diet now, refused TF. Renal function improving, borderline UO     sleeping, UO borderline 450ml, still draining>1.5 lit /day from ascitis, persistent leukocytosis, OFF Abx. Renal function improving slowly.  sitting in chair, feels ok, appetite better. Renal function improving   awake, denies complaints, low po intake,  ml, paredes has been removed. Renal function improving  22 --> F/U RANDY  9-10-22 awake, eating lunch, no complaints, seems lucid   no complaints, creatinine stable, not increasing, making urine, able to eat   eating breakfast, denies N/V/Abd pain, UO not recoreded. Cr decreasing, k low  , feeling ok, no complaints,   22 No acute issues. Improved appetite. Still looks ill.        Medications reviewed:  Current Facility-Administered Medications   Medication Dose Route Frequency    pantoprazole (PROTONIX) tablet 40 mg  40 mg Oral ACB    lidocaine 4 % patch 1 Patch  1 Patch TransDERmal Q24H    magnesium oxide (MAG-OX) tablet 400 mg  400 mg Oral DAILY    potassium chloride SR (KLOR-CON 10) tablet 10 mEq  10 mEq Oral BID    nystatin (MYCOSTATIN) 100,000 unit/mL oral suspension 500,000 Units  500,000 Units Oral QID lactulose (CHRONULAC) 10 gram/15 mL solution 30 mL  20 g Oral DAILY    midodrine (PROAMATINE) tablet 15 mg  15 mg Oral Q8H    sodium bicarbonate tablet 650 mg  650 mg Oral BID    0.9% sodium chloride infusion 250 mL  250 mL IntraVENous PRN    [Held by provider] oxyCODONE IR (ROXICODONE) tablet 5 mg  5 mg Oral Q4H PRN    ARIPiprazole (ABILIFY) tablet 2 mg  2 mg Oral DAILY    buPROPion SR (WELLBUTRIN SR) tablet 150 mg  150 mg Oral DAILY    [Held by provider] LORazepam (ATIVAN) tablet 1 mg  1 mg Oral BID PRN    sodium chloride (NS) flush 5-40 mL  5-40 mL IntraVENous Q8H    sodium chloride (NS) flush 5-40 mL  5-40 mL IntraVENous PRN    acetaminophen (TYLENOL) tablet 650 mg  650 mg Oral Q6H PRN    Or    acetaminophen (TYLENOL) suppository 650 mg  650 mg Rectal Q6H PRN    polyethylene glycol (MIRALAX) packet 17 g  17 g Oral DAILY PRN    ondansetron (ZOFRAN ODT) tablet 4 mg  4 mg Oral Q8H PRN    Or    ondansetron (ZOFRAN) injection 4 mg  4 mg IntraVENous Q6H PRN    heparin (porcine) injection 5,000 Units  5,000 Units SubCUTAneous Q8H    L.acidophilus-paracasei-S.thermophil-bifidobacter (RISAQUAD) 8 billion cell capsule  1 Capsule Oral DAILY    nicotine (NICODERM CQ) 21 mg/24 hr patch 1 Patch  1 Patch TransDERmal DAILY        Objective:   Vitals:  Visit Vitals  /70 (BP 1 Location: Right upper arm, BP Patient Position: Semi fowlers)   Pulse 66   Temp 98 °F (36.7 °C)   Resp 16   Ht 5' 3\" (1.6 m)   Wt 47.2 kg (104 lb 0.9 oz)   SpO2 98%   BMI 18.43 kg/m²     Temp (24hrs), Av.9 °F (36.6 °C), Min:97.6 °F (36.4 °C), Max:98 °F (36.7 °C)      O2 Device: None (Room air)    Last 24hr Input/Output:    Intake/Output Summary (Last 24 hours) at 2022 0902  Last data filed at 2022 0906  Gross per 24 hour   Intake 620 ml   Output 2125 ml   Net -1505 ml          Physical Exam:    Seen in Room 464.     General: Chronically ill-looking lady , jaundiced, awake     HEENT:  sclerae icteric,    Neck: Supple    Lungs : Clear to auscultation, no wheezes, no rales    CVS: RRR, S1 S2 normal, No rub, no  LE edema    Abdomen: Ascites+, drain+, not tender    Extremities: No edema    Skin: jaundice    Neurologic: Awake and interacting    Lab Data Reviewed:    Recent Results (from the past 24 hour(s))   GLUCOSE, POC    Collection Time: 09/13/22 11:26 AM   Result Value Ref Range    Glucose (POC) 119 (H) 65 - 117 mg/dL    Performed by Clarissa BOO          Assessment/Plan:   Principal Problem:    Alcoholic cirrhosis of liver with ascites ([facility] [address] (8/30/2022)    Active Problems:    Severe protein-calorie malnutrition ([facility] [address] (8/31/2022)     Acute kidney injury -cr 0.4 in early August,   -RANDY/possible ATN due to intravascular volume depletion in setting of poor po intake, hypotension /third spacing and possible sepsis/NSAIDs. Hepatorenal in DDx . Low FeNa-low urine output  Possible HRS. Not a candidate liver transplant Per Dr Charanjit Scruggs. Not a candidate for RRT. -renal function improving  -c/w  po bicarb   -off tube feeding-nutritional support  -strict Is and Os  -avoid nephrotoxins, IV contrast/NSAIDs  -dose meds per GFR  - has persistent leukocytosis and worsening, ID consulted  -c/w with IV albumin, change to q12hr, octreotide and midodrine  -off octreotide   No indications for RRT at this time. Hypotension-with h/o HTN on Coreg, due to effective intravascular volume depletion  -on prednisone per med list, but patient denies taking that.  -midodrine  - BP is improved     Hyponatremia,resolved, -due to liver cirrhosis and volume depletion, chronic since early August  -improving with IVF  -Avoid rapid correction  -improved    Hypokalemia/hypophosphatemia, hypomagnesemia   -replete prn  -replete Mg  K is normal    Alcoholic liver cirrhosis-decompensated. -Hypoalbuminemia/ascitis/coagulopathy-nl ammonia  -hepatology following-not a candidate for liver transplant.  Poor prognosis  - on lactulose    Anemia  -high iron stores  -Hb has dropped, might be due to hydration, r/o bleed  -transfuse for Hb<7, now Hb is 8.0    Persistent Leukocytosis: off Rocephin-source of infection? -manegaement per primary team    Hypoglycemia, resolved    DNR    Overall Poor prognosis      ___________________________________________________    Total time spent with patient:  [] 15   [] 25   [] 35   []  __ minutes    [] Critical Care Provided    Care Plan discussed with RN        [x] Patient   [] Family    [] Care Manager   [] Consultant/Specialist :      []   >50% of visit spent in counseling and coordination of care   (Discussed [] CODE status,  [] Care Plan, [] D/C Planning)    ___________________________________________________    Attending Physician: MD Eleonora Sykesas [id number]

## 2022-09-14 NOTE — PROGRESS NOTES
Bedside and Verbal shift change report given to Zarina Neri  (oncoming nurse) by Jameel Magallon  (offgoing nurse). Report included the following information SBAR.

## 2022-09-15 VITALS
HEART RATE: 68 BPM | RESPIRATION RATE: 18 BRPM | OXYGEN SATURATION: 96 % | BODY MASS INDEX: 18.44 KG/M2 | SYSTOLIC BLOOD PRESSURE: 105 MMHG | WEIGHT: 104.06 LBS | HEIGHT: 63 IN | TEMPERATURE: 97.4 F | DIASTOLIC BLOOD PRESSURE: 60 MMHG

## 2022-09-15 PROBLEM — E43 SEVERE PROTEIN-CALORIE MALNUTRITION (HCC): Status: RESOLVED | Noted: 2022-08-31 | Resolved: 2022-09-15

## 2022-09-15 PROBLEM — E43 SEVERE PROTEIN-CALORIE MALNUTRITION (HCC): Chronic | Status: RESOLVED | Noted: 2022-08-31 | Resolved: 2022-09-15

## 2022-09-15 LAB
ALBUMIN SERPL-MCNC: 3.2 G/DL (ref 3.5–5)
ANION GAP SERPL CALC-SCNC: 9 MMOL/L (ref 5–15)
BUN SERPL-MCNC: 60 MG/DL (ref 6–20)
BUN/CREAT SERPL: 26 (ref 12–20)
CALCIUM SERPL-MCNC: 9.8 MG/DL (ref 8.5–10.1)
CHLORIDE SERPL-SCNC: 103 MMOL/L (ref 97–108)
CO2 SERPL-SCNC: 21 MMOL/L (ref 21–32)
CREAT SERPL-MCNC: 2.28 MG/DL (ref 0.55–1.02)
FERRITIN SERPL-MCNC: 570 NG/ML (ref 26–388)
GLUCOSE SERPL-MCNC: 82 MG/DL (ref 65–100)
HBV SURFACE AB SER QL: NONREACTIVE
HBV SURFACE AB SER-ACNC: <3.1 MIU/ML
HBV SURFACE AG SER QL: <0.1 INDEX
HBV SURFACE AG SER QL: NEGATIVE
HCV AB SERPL QL IA: NONREACTIVE
IRON SATN MFR SERPL: 75 % (ref 20–50)
IRON SERPL-MCNC: 66 UG/DL (ref 35–150)
PHOSPHATE SERPL-MCNC: 3.5 MG/DL (ref 2.6–4.7)
POTASSIUM SERPL-SCNC: 4.5 MMOL/L (ref 3.5–5.1)
SODIUM SERPL-SCNC: 133 MMOL/L (ref 136–145)
TIBC SERPL-MCNC: 88 UG/DL (ref 250–450)

## 2022-09-15 PROCEDURE — 82390 ASSAY OF CERULOPLASMIN: CPT

## 2022-09-15 PROCEDURE — 86704 HEP B CORE ANTIBODY TOTAL: CPT

## 2022-09-15 PROCEDURE — 86015 ACTIN ANTIBODY EACH: CPT

## 2022-09-15 PROCEDURE — 82103 ALPHA-1-ANTITRYPSIN TOTAL: CPT

## 2022-09-15 PROCEDURE — 74011250637 HC RX REV CODE- 250/637: Performed by: INTERNAL MEDICINE

## 2022-09-15 PROCEDURE — 87340 HEPATITIS B SURFACE AG IA: CPT

## 2022-09-15 PROCEDURE — 86038 ANTINUCLEAR ANTIBODIES: CPT

## 2022-09-15 PROCEDURE — 83540 ASSAY OF IRON: CPT

## 2022-09-15 PROCEDURE — 86708 HEPATITIS A ANTIBODY: CPT

## 2022-09-15 PROCEDURE — 74011250636 HC RX REV CODE- 250/636: Performed by: INTERNAL MEDICINE

## 2022-09-15 PROCEDURE — 86803 HEPATITIS C AB TEST: CPT

## 2022-09-15 PROCEDURE — 74011000250 HC RX REV CODE- 250: Performed by: INTERNAL MEDICINE

## 2022-09-15 PROCEDURE — 86706 HEP B SURFACE ANTIBODY: CPT

## 2022-09-15 PROCEDURE — 80069 RENAL FUNCTION PANEL: CPT

## 2022-09-15 PROCEDURE — 82728 ASSAY OF FERRITIN: CPT

## 2022-09-15 PROCEDURE — 36415 COLL VENOUS BLD VENIPUNCTURE: CPT

## 2022-09-15 PROCEDURE — 74011250637 HC RX REV CODE- 250/637: Performed by: FAMILY MEDICINE

## 2022-09-15 PROCEDURE — 86381 MITOCHONDRIAL ANTIBODY EACH: CPT

## 2022-09-15 RX ORDER — LANOLIN ALCOHOL/MO/W.PET/CERES
400 CREAM (GRAM) TOPICAL DAILY
Qty: 30 TABLET | Refills: 0 | Status: SHIPPED
Start: 2022-09-15

## 2022-09-15 RX ORDER — PANTOPRAZOLE SODIUM 40 MG/1
40 TABLET, DELAYED RELEASE ORAL
Qty: 30 TABLET | Refills: 0 | Status: SHIPPED
Start: 2022-09-16

## 2022-09-15 RX ORDER — POTASSIUM CHLORIDE 750 MG/1
10 TABLET, FILM COATED, EXTENDED RELEASE ORAL 2 TIMES DAILY
Qty: 2 TABLET | Refills: 0 | Status: SHIPPED
Start: 2022-09-15

## 2022-09-15 RX ORDER — IBUPROFEN 200 MG
1 TABLET ORAL DAILY
Qty: 30 PATCH | Refills: 0 | Status: SHIPPED
Start: 2022-09-15 | End: 2022-10-15

## 2022-09-15 RX ORDER — NYSTATIN 100000 [USP'U]/ML
500000 SUSPENSION ORAL 4 TIMES DAILY
Qty: 270 ML | Refills: 0 | Status: SHIPPED | OUTPATIENT
Start: 2022-09-15

## 2022-09-15 RX ORDER — MIDODRINE HYDROCHLORIDE 5 MG/1
15 TABLET ORAL EVERY 8 HOURS
Qty: 30 TABLET | Refills: 0 | Status: SHIPPED
Start: 2022-09-15 | End: 2022-10-15

## 2022-09-15 RX ORDER — LIDOCAINE 4 G/100G
PATCH TOPICAL
Qty: 1 PATCH | Refills: 0 | Status: SHIPPED
Start: 2022-09-15

## 2022-09-15 RX ORDER — SODIUM BICARBONATE 650 MG/1
650 TABLET ORAL 2 TIMES DAILY
Qty: 30 TABLET | Refills: 0 | Status: SHIPPED
Start: 2022-09-15

## 2022-09-15 RX ORDER — ONDANSETRON 4 MG/1
4 TABLET, ORALLY DISINTEGRATING ORAL
Qty: 10 TABLET | Refills: 0 | Status: SHIPPED
Start: 2022-09-15

## 2022-09-15 RX ADMIN — HEPARIN SODIUM 5000 UNITS: 5000 INJECTION INTRAVENOUS; SUBCUTANEOUS at 00:00

## 2022-09-15 RX ADMIN — Medication 1 CAPSULE: at 09:04

## 2022-09-15 RX ADMIN — MIDODRINE HYDROCHLORIDE 15 MG: 5 TABLET ORAL at 05:30

## 2022-09-15 RX ADMIN — SODIUM CHLORIDE, PRESERVATIVE FREE 10 ML: 5 INJECTION INTRAVENOUS at 05:30

## 2022-09-15 RX ADMIN — SODIUM BICARBONATE 650 MG: 650 TABLET ORAL at 09:05

## 2022-09-15 RX ADMIN — POTASSIUM CHLORIDE 10 MEQ: 750 TABLET, FILM COATED, EXTENDED RELEASE ORAL at 09:05

## 2022-09-15 RX ADMIN — PANTOPRAZOLE SODIUM 40 MG: 40 TABLET, DELAYED RELEASE ORAL at 07:04

## 2022-09-15 RX ADMIN — ARIPIPRAZOLE 2 MG: 2 TABLET ORAL at 09:04

## 2022-09-15 RX ADMIN — BUPROPION HYDROCHLORIDE 150 MG: 150 TABLET, EXTENDED RELEASE ORAL at 09:04

## 2022-09-15 RX ADMIN — HEPARIN SODIUM 5000 UNITS: 5000 INJECTION INTRAVENOUS; SUBCUTANEOUS at 07:00

## 2022-09-15 RX ADMIN — Medication 400 MG: at 09:04

## 2022-09-15 NOTE — PROGRESS NOTES
Bedside and Verbal shift change report given to Flynn Dietz (oncoming nurse) by Fide Salgado (offgoing nurse). Report included the following information SBAR, Kardex, Procedure Summary, Intake/Output, MAR, and Recent Results.

## 2022-09-15 NOTE — PROGRESS NOTES
Renal Progress Note    NAME:  Tracie Hodge   :   1960   MRN:   [id number]     Date/Time:  9/15/2022   Subjective:        seen and examined, has no complaint, not oriented to place or time, had ascitis drain placed by IR, 3 lit drained sofar, 400 ml uo   more sleepy and less interactive, does not answer the questions. Oliguric, no significant change in cr. Possibly HRS. WBC still elevated. Has NGT on TF  9/3 awake, weak, denies pain, NGT is out and will be replaced. Had 600 ml urine/o, BP better, cr stable   more awake and alert, interactive today, wants to eat, , renal function improving, unable to place back the NGT, still significant leukocytosis. Denies abd pain, cough   awake and alert, denies pain, nausea, abd pain. On po diet now, refused TF. Renal function improving, borderline UO     sleeping, UO borderline 450ml, still draining>1.5 lit /day from ascitis, persistent leukocytosis, OFF Abx. Renal function improving slowly.  sitting in chair, feels ok, appetite better. Renal function improving   awake, denies complaints, low po intake,  ml, paredes has been removed. Renal function improving  22 --> F/U RANDY  9-10-22 awake, eating lunch, no complaints, seems lucid   no complaints, creatinine stable, not increasing, making urine, able to eat   eating breakfast, denies N/V/Abd pain, UO not recoreded. Cr decreasing, k low  , feeling ok, no complaints,   22 No acute issues. Improved appetite. Still looks ill.   9/15/22 Discharge plans noted. Patient feels better.       Medications reviewed:  Current Facility-Administered Medications   Medication Dose Route Frequency    sodium chloride (NS) flush 5-40 mL  5-40 mL IntraVENous Q8H    sodium chloride (NS) flush 5-40 mL  5-40 mL IntraVENous PRN    pantoprazole (PROTONIX) tablet 40 mg  40 mg Oral ACB    lidocaine 4 % patch 1 Patch  1 Patch TransDERmal Q24H    magnesium oxide (MAG-OX) tablet 400 mg  400 mg Oral DAILY    potassium chloride SR (KLOR-CON 10) tablet 10 mEq  10 mEq Oral BID    nystatin (MYCOSTATIN) 100,000 unit/mL oral suspension 500,000 Units  500,000 Units Oral QID    lactulose (CHRONULAC) 10 gram/15 mL solution 30 mL  20 g Oral DAILY    midodrine (PROAMATINE) tablet 15 mg  15 mg Oral Q8H    sodium bicarbonate tablet 650 mg  650 mg Oral BID    0.9% sodium chloride infusion 250 mL  250 mL IntraVENous PRN    [Held by provider] oxyCODONE IR (ROXICODONE) tablet 5 mg  5 mg Oral Q4H PRN    ARIPiprazole (ABILIFY) tablet 2 mg  2 mg Oral DAILY    buPROPion SR (WELLBUTRIN SR) tablet 150 mg  150 mg Oral DAILY    [Held by provider] LORazepam (ATIVAN) tablet 1 mg  1 mg Oral BID PRN    sodium chloride (NS) flush 5-40 mL  5-40 mL IntraVENous Q8H    sodium chloride (NS) flush 5-40 mL  5-40 mL IntraVENous PRN    acetaminophen (TYLENOL) tablet 650 mg  650 mg Oral Q6H PRN    Or    acetaminophen (TYLENOL) suppository 650 mg  650 mg Rectal Q6H PRN    polyethylene glycol (MIRALAX) packet 17 g  17 g Oral DAILY PRN    ondansetron (ZOFRAN ODT) tablet 4 mg  4 mg Oral Q8H PRN    Or    ondansetron (ZOFRAN) injection 4 mg  4 mg IntraVENous Q6H PRN    heparin (porcine) injection 5,000 Units  5,000 Units SubCUTAneous Q8H    L.acidophilus-paracasei-S.thermophil-bifidobacter (RISAQUAD) 8 billion cell capsule  1 Capsule Oral DAILY    nicotine (NICODERM CQ) 21 mg/24 hr patch 1 Patch  1 Patch TransDERmal DAILY        Objective:   Vitals:  Visit Vitals  /61   Pulse 62   Temp 97.5 °F (36.4 °C)   Resp 18   Ht 5' 3\" (1.6 m)   Wt 47.2 kg (104 lb 0.9 oz)   SpO2 100%   BMI 18.43 kg/m²     Temp (24hrs), Av.6 °F (36.4 °C), Min:97.1 °F (36.2 °C), Max:98 °F (36.7 °C)      O2 Device: None (Room air)    Last 24hr Input/Output:    Intake/Output Summary (Last 24 hours) at 9/15/2022 1054  Last data filed at 9/15/2022 0600  Gross per 24 hour   Intake --   Output 1825 ml   Net -1825 ml          Physical Exam:    Seen in Room 464.     General: Chronically ill-looking lady , jaundiced, awake     HEENT:  sclerae icteric,    Neck: Supple    Lungs : Clear to auscultation, no wheezes, no rales    CVS: RRR, S1 S2 normal, No rub, no  LE edema    Abdomen: Ascites+, drain+, not tender    Extremities: No edema    Skin: jaundice    Neurologic: Awake and interacting    Lab Data Reviewed:    Recent Results (from the past 24 hour(s))   RENAL FUNCTION PANEL    Collection Time: 09/14/22 11:54 AM   Result Value Ref Range    Sodium 132 (L) 136 - 145 mmol/L    Potassium 4.5 3.5 - 5.1 mmol/L    Chloride 103 97 - 108 mmol/L    CO2 19 (L) 21 - 32 mmol/L    Anion gap 10 5 - 15 mmol/L    Glucose 81 65 - 100 mg/dL    BUN 65 (H) 6 - 20 MG/DL    Creatinine 2.12 (H) 0.55 - 1.02 MG/DL    BUN/Creatinine ratio 31 (H) 12 - 20      GFR est AA 29 (L) >60 ml/min/1.73m2    GFR est non-AA 24 (L) >60 ml/min/1.73m2    Calcium 9.7 8.5 - 10.1 MG/DL    Phosphorus 3.3 2.6 - 4.7 MG/DL    Albumin 3.3 (L) 3.5 - 5.0 g/dL   RENAL FUNCTION PANEL    Collection Time: 09/15/22  5:13 AM   Result Value Ref Range    Sodium 133 (L) 136 - 145 mmol/L    Potassium 4.5 3.5 - 5.1 mmol/L    Chloride 103 97 - 108 mmol/L    CO2 21 21 - 32 mmol/L    Anion gap 9 5 - 15 mmol/L    Glucose 82 65 - 100 mg/dL    BUN 60 (H) 6 - 20 MG/DL    Creatinine 2.28 (H) 0.55 - 1.02 MG/DL    BUN/Creatinine ratio 26 (H) 12 - 20      GFR est AA 26 (L) >60 ml/min/1.73m2    GFR est non-AA 22 (L) >60 ml/min/1.73m2    Calcium 9.8 8.5 - 10.1 MG/DL    Phosphorus 3.5 2.6 - 4.7 MG/DL    Albumin 3.2 (L) 3.5 - 5.0 g/dL   FERRITIN    Collection Time: 09/15/22  5:13 AM   Result Value Ref Range    Ferritin 570 (H) 26 - 388 NG/ML   IRON PROFILE    Collection Time: 09/15/22  5:13 AM   Result Value Ref Range    Iron 66 35 - 150 ug/dL    TIBC 88 (L) 250 - 450 ug/dL    Iron % saturation 75 (H) 20 - 50 %   HEPATITIS C AB    Collection Time: 09/15/22  5:13 AM   Result Value Ref Range    Hep C virus Ab Interp.  NONREACTIVE NR     HEP B SURFACE AG    Collection Time: 09/15/22  5:13 AM   Result Value Ref Range    Hepatitis B surface Ag <0.10 Index    Hep B surface Ag Interp. Negative NEG     HEP B SURFACE AB    Collection Time: 09/15/22  5:13 AM   Result Value Ref Range    Hepatitis B surface Ab <3.10 mIU/mL    Hep B surface Ab Interp. NONREACTIVE NR           Assessment/Plan:   Principal Problem:    Alcoholic cirrhosis of liver with ascites ([facility] [address] (8/30/2022)     Acute kidney injury -cr 0.4 in early August,   -RANDY/possible ATN due to intravascular volume depletion in setting of poor po intake, hypotension /third spacing and possible sepsis/NSAIDs. Hepatorenal in DDx . Low FeNa-low urine output  Possible HRS. Not a candidate liver transplant Per Dr Alisha Odom. Not a candidate for RRT. -renal function improving  -c/w  po bicarb   -off tube feeding-nutritional support  -strict Is and Os  -avoid nephrotoxins, IV contrast/NSAIDs  -dose meds per GFR  - has persistent leukocytosis and worsening, ID consulted  -c/w with IV albumin, change to q12hr, octreotide and midodrine  -off octreotide   No indications for RRT at this time. Will follow up with PCP. If needed our service will be available for further management as outpatient. Hypotension-with h/o HTN on Coreg, due to effective intravascular volume depletion  -on prednisone per med list, but patient denies taking that.  -midodrine  - BP is improved     Hyponatremia,resolved, -due to liver cirrhosis and volume depletion, chronic since early August  -improving with IVF  -Avoid rapid correction  -improved    Hypokalemia/hypophosphatemia, hypomagnesemia   -replete prn  -replete Mg  K is normal    Alcoholic liver cirrhosis-decompensated. -Hypoalbuminemia/ascitis/coagulopathy-nl ammonia  -hepatology following-not a candidate for liver transplant.  Poor prognosis  - on lactulose    Anemia  -high iron stores  -Hb has dropped, might be due to hydration, r/o bleed  -transfuse for Hb<7, now Hb is 8.0    Persistent Leukocytosis: off Rocephin-source of infection? -manegaement per primary team    Hypoglycemia, resolved    DNR    Overall Poor prognosis      ___________________________________________________    Total time spent with patient:  [] 15   [] 25   [] 35   []  __ minutes    [] Critical Care Provided    Care Plan discussed with RN        [x] Patient   [] Family    [] Care Manager   [] Consultant/Specialist :      []   >50% of visit spent in counseling and coordination of care   (Discussed [] CODE status,  [] Care Plan, [] D/C Planning)    ___________________________________________________    Attending Physician: Anthony Gleason MD    [address] [place] [address]

## 2022-09-15 NOTE — PROGRESS NOTES
[address] [facility] Adult  Hospitalist Group                                                                                          Hospitalist Progress Note  Celine Green MD  Answering service: [phone] or [phone] from in house phone        Date of Service:  2022  NAME:  Halley Ace  :  1960  MRN:  [id number]      Admission Summary:   Per H&P, This is a 81-year-old woman with a past medical history significant for hypertension, alcoholic liver cirrhosis, who presented at the emergency room with abdominal pain. The patient presented at [facility] Emergency Room. When the patient arrived at the emergency room, the patient was found to have decompensation of liver cirrhosis. The hospitalist service at [facility] was asked to directly admit the patient to [facility], so that the patient can be seen by the hepatologist.  The patient came to the emergency room at [facility], because of lack of bed on the on the medical floor. When the patient arrived at the emergency room, the patient's lab work shows significant abnormality. She was subsequently referred to the hospitalist service for admission. No record of prior admission to this hospital.  It was reported that the patient underwent abdominal paracentesis before coming to [facility].  The amount of fluid removed is not known. The patient's abdominal pain is diffuse in location, dull ache, constant, 7/10 in severity with no known aggravating or relieving factors associated with nausea, but no vomiting, no diarrhea, and no constipation. Interval history / Subjective:   Patient without complaints  Denies pain, dyspnea, n/v/d.   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 Serum ammonia was mildly elevated. Likely developed HE. Will continue lactulose at low dose with goal 2 bm's daily. Severe protein calorie malnutrition  Muscle wasting, bitemporal wasting. Due to her disease process  DHT advanced to duodenum 9/1 and resumed TFs - FT came out overnight 9/6/22 could not be replaced. Became more alert after lactulose so now on diet but PO intake remains poor. Refusing TF placement now. Discussed at length with her to at least finish her Ensures otherwise the FT has to be reinserted else she won't survive due to malnutrition. Improved PO intake the last few days    Acute kidney injury- improved and stable  Suspected ATN, IVVD due to poor PO intake, hypotension, NSAID use. Off albumin and IVFLuids-stable renal function stable    Hypotension- low stable  Continue midodrine, now off IV albumin and IV fluids     Anemia  Iron studies consistent with chronic disease  Transfuse PRN - unit ordered for 9/2  Vitamin K per hepatology for coagulopathy    Persistent leukocytosis: unclear etiology, no evidence of infection clinically. Afebrile. Has been on empiric IV ceftriaxone for 6 days, stopped 9/4. Sent UA and blood cultures. UA with yeast and bacteria, minimal pyuria, unlikely source of leukocytosis. Removed paredes 9/6. Follow-up blood cultures. Hypothermia- Sandy hugger used 9/12/22- temp normal today    Code status: DNR - continue full current care otherwise but do not escalate (if pt rapidly deteriorates make comfort care and inform sister over the phone 24/7).    Prophylaxis: SCD  Care Plan discussed with: RN     Hospital Problems  Date Reviewed: 8/30/2022            Codes Class Noted POA Severe protein-calorie malnutrition ([facility] [address] (Chronic) ICD-10-CM: G42  ICD-9-CM: 933  8/31/2022 Yes        * (Principal) Alcoholic cirrhosis of liver with ascites [facility]) ICD-10-CM: K70.31  ICD-9-CM: 571.2  8/30/2022 Yes       Review of Systems:   Review of systems not obtained due to patient factors. Vital Signs:    Last 24hrs VS reviewed since prior progress note.  Most recent are:  Visit Vitals  BP (!) 93/46   Pulse 62   Temp 97.5 °F (36.4 °C)   Resp 18   Ht 5' 3\" (1.6 m)   Wt 47.2 kg (104 lb 0.9 oz)   SpO2 94%   BMI 18.43 kg/m²     Patient Vitals for the past 24 hrs:   Temp Pulse Resp BP SpO2   09/14/22 2157 -- 62 -- -- --   09/14/22 2109 97.5 °F (36.4 °C) 60 18 (!) 93/46 94 %   09/14/22 2022 -- 63 -- -- --   09/14/22 1800 -- 64 -- -- --   09/14/22 1706 97.6 °F (36.4 °C) 66 16 (!) 106/59 100 %   09/14/22 1615 -- 62 16 (!) 89/64 100 %   09/14/22 1545 -- 63 14 (!) 89/66 93 %   09/14/22 1540 -- 63 14 (!) 86/51 100 %   09/14/22 1530 -- (!) 58 15 (!) 82/49 100 %   09/14/22 1525 -- 62 15 (!) 79/47 100 %   09/14/22 1520 -- 66 15 (!) 80/48 99 %   09/14/22 1519 97.1 °F (36.2 °C) 62 16 (!) 74/46 100 %   09/14/22 1515 -- 63 18 (!) 96/39 100 %   09/14/22 1435 98 °F (36.7 °C) 61 16 (!) 105/52 100 %   09/14/22 1241 97.9 °F (36.6 °C) 62 16 112/61 96 %   09/14/22 1200 -- (!) 56 -- -- --   09/14/22 1000 -- 75 -- -- --   09/14/22 0954 -- 72 -- (!) 98/50 --   09/14/22 0951 -- -- -- (!) 100/51 --   09/14/22 0947 -- 70 -- (!) 98/51 98 %   09/14/22 0928 -- 66 -- 123/70 98 %   09/14/22 0732 98 °F (36.7 °C) (!) 57 16 125/71 100 %   09/14/22 0548 -- 61 -- -- --   09/14/22 0355 -- 87 -- -- --   09/14/22 0159 -- 66 -- -- --         Intake/Output Summary (Last 24 hours) at 9/14/2022 2305  Last data filed at 9/14/2022 1729  Gross per 24 hour   Intake --   Output 2200 ml   Net -2200 ml          Physical Examination:     I had a face to face encounter with this patient and independently examined them on 9/14/2022 as outlined below:          Constitutional:  NAD, cachectic, chronically-ill appearing   ENT:  Oral mucosa moist, Icteric sclerae    Resp:  CTA bilaterally. No wheezing/rhonchi/rales. No accessory muscle use. CV:  Regular rhythm, normal rate, soft systolic murmur     GI: Soft, more distended today, NT, bs+    Musculoskeletal:  No edema, warm, 2+     Neurologic:  Grossly non-focal, alert currently but intermittently drowsy  Psych: not anxious nor agitated     Skin: jaundiced       Data Review:    Review and/or order of clinical lab test  Review and/or order of tests in the radiology section of [facility]      Labs:     Recent Labs     09/13/22 0323   WBC 21.3*   HGB 8.0*   HCT 23.0*          Recent Labs     09/14/22 1154 09/13/22 0323 09/12/22  0623   * 135* 134*   K 4.5 3.9 3.4*    103 102   CO2 19* 20* 23   BUN 65* 65* 63*   CREA 2.12* 2.13* 2.21*   GLU 81 94 86   CA 9.7 8.7 8.7   PHOS 3.3 3.0 3.2       Recent Labs     09/14/22 1154 09/13/22 0323 09/12/22  0623   ALB 3.3* 3.1* 2.9*       No results for input(s): INR, PTP, APTT, INREXT, INREXT in the last 72 hours. No results for input(s): FE, TIBC, PSAT, FERR in the last 72 hours. Lab Results   Component Value Date/Time    Folate 14.7 08/30/2022 01:52 AM        No results for input(s): PH, PCO2, PO2 in the last 72 hours. No results for input(s): CPK, CKNDX, TROIQ in the last 72 hours.     No lab exists for component: CPKMB  No results found for: CHOL, CHOLX, CHLST, CHOLV, HDL, HDLP, LDL, LDLC, DLDLP, TGLX, TRIGL, TRIGP, CHHD, CHHDX  Lab Results   Component Value Date/Time    Glucose (POC) 119 (H) 09/13/2022 11:26 AM    Glucose (POC) 121 (H) 09/10/2022 04:14 PM    Glucose (POC) 128 (H) 09/10/2022 11:23 AM    Glucose (POC) 145 (H) 09/06/2022 11:20 AM    Glucose (POC) 131 (H) 09/05/2022 11:55 PM     Lab Results   Component Value Date/Time    Color DARK YELLOW 09/04/2022 05:22 PM    Appearance TURBID (A) 09/04/2022 05:22 PM    Specific gravity 1.017 09/04/2022 05:22 PM    pH (UA) 5.5 09/04/2022 05:22 PM    Protein 100 (A) 09/04/2022 05:22 PM    Glucose Negative 09/04/2022 05:22 PM    Ketone Negative 09/04/2022 05:22 PM    Urobilinogen 1.0 09/04/2022 05:22 PM    Nitrites Positive (A) 09/04/2022 05:22 PM    Leukocyte Esterase LARGE (A) 09/04/2022 05:22 PM    Epithelial cells FEW 09/04/2022 05:22 PM    Bacteria 1+ (A) 09/04/2022 05:22 PM    WBC 5-10 09/04/2022 05:22 PM    RBC 5-10 09/04/2022 05:22 PM         Medications Reviewed:     Current Facility-Administered Medications   Medication Dose Route Frequency    sodium chloride (NS) flush 5-40 mL  5-40 mL IntraVENous Q8H    sodium chloride (NS) flush 5-40 mL  5-40 mL IntraVENous PRN    pantoprazole (PROTONIX) tablet 40 mg  40 mg Oral ACB    lidocaine 4 % patch 1 Patch  1 Patch TransDERmal Q24H    magnesium oxide (MAG-OX) tablet 400 mg  400 mg Oral DAILY    potassium chloride SR (KLOR-CON 10) tablet 10 mEq  10 mEq Oral BID    nystatin (MYCOSTATIN) 100,000 unit/mL oral suspension 500,000 Units  500,000 Units Oral QID    lactulose (CHRONULAC) 10 gram/15 mL solution 30 mL  20 g Oral DAILY    midodrine (PROAMATINE) tablet 15 mg  15 mg Oral Q8H    sodium bicarbonate tablet 650 mg  650 mg Oral BID    0.9% sodium chloride infusion 250 mL  250 mL IntraVENous PRN    [Held by provider] oxyCODONE IR (ROXICODONE) tablet 5 mg  5 mg Oral Q4H PRN    ARIPiprazole (ABILIFY) tablet 2 mg  2 mg Oral DAILY    buPROPion SR (WELLBUTRIN SR) tablet 150 mg  150 mg Oral DAILY    [Held by provider] LORazepam (ATIVAN) tablet 1 mg  1 mg Oral BID PRN    sodium chloride (NS) flush 5-40 mL  5-40 mL IntraVENous Q8H    sodium chloride (NS) flush 5-40 mL  5-40 mL IntraVENous PRN    acetaminophen (TYLENOL) tablet 650 mg  650 mg Oral Q6H PRN    Or    acetaminophen (TYLENOL) suppository 650 mg  650 mg Rectal Q6H PRN    polyethylene glycol (MIRALAX) packet 17 g  17 g Oral DAILY PRN    ondansetron (ZOFRAN ODT) tablet 4 mg  4 mg Oral Q8H PRN    Or ondansetron (ZOFRAN) injection 4 mg  4 mg IntraVENous Q6H PRN    heparin (porcine) injection 5,000 Units  5,000 Units SubCUTAneous Q8H    L.acidophilus-paracasei-S.thermophil-bifidobacter (RISAQUAD) 8 billion cell capsule  1 Capsule Oral DAILY    nicotine (NICODERM CQ) 21 mg/24 hr patch 1 Patch  1 Patch TransDERmal DAILY     ______________________________________________________________________  EXPECTED LENGTH OF STAY: 4d 16h  ACTUAL LENGTH OF STAY:          15                 Rene Das MD

## 2022-09-15 NOTE — PROGRESS NOTES
TRANSFER - OUT REPORT:    Verbal report given to Juliana(ghazala) on [employer]  being transferred to [facility](unit) for routine progression of care       Report consisted of patients Situation, Background, Assessment and   Recommendations(SBAR). Information from the following report(s) SBAR, Kardex, Intake/Output, MAR, and Recent Results was reviewed with the receiving nurse. Lines:       Opportunity for questions and clarification was provided. Patient transported with:  Belongings.

## 2022-09-15 NOTE — DISCHARGE SUMMARY
Discharge Summary       PATIENT ID: Allie Mitchell  MRN: [id number]   YOB: 1960    DATE OF ADMISSION: 8/29/2022 11:48 PM    DATE OF DISCHARGE: 9/15/2022   PRIMARY CARE PROVIDER: Cathryn Forrest MD     ATTENDING PHYSICIAN: Dr. Mariah Quiñones  DISCHARGING PROVIDER: Suellen Cunha MD    To contact this individual call [phone] and ask the  to page. If unavailable ask to be transferred the Adult Hospitalist Department. CONSULTATIONS: IP CONSULT TO NEPHROLOGY  IP CONSULT TO HEPATOLOGY  IP CONSULT TO INTERVENTIONAL RADIOLOGY  IP CONSULT TO INFECTIOUS DISEASES    PROCEDURES/SURGERIES: Procedure(s):  ESOPHAGOGASTRODUODENOSCOPY (EGD)    ADMISSION SUMMARY AND HOSPITAL COURSE:       DISCHARGE DIAGNOSES / PLAN:            Admission Summary: \"Per H&P, This is a 67-year-old woman with a past medical history significant for hypertension, alcoholic liver cirrhosis, who presented at the emergency room with abdominal pain. The patient presented at [facility] Emergency Room. When the patient arrived at the emergency room, the patient was found to have decompensation of liver cirrhosis. The hospitalist service at [facility] was asked to directly admit the patient to [facility], so that the patient can be seen by the hepatologist.  The patient came to the emergency room at [facility], because of lack of bed on the on the medical floor. When the patient arrived at the emergency room, the patient's lab work shows significant abnormality. She was subsequently referred to the hospitalist service for admission. No record of prior admission to this hospital.  It was reported that the patient underwent abdominal paracentesis before coming to [facility].  The amount of fluid removed is not known.   The patient's abdominal pain is diffuse in location, dull ache, constant, 7/10 in severity with no known aggravating or relieving factors associated with nausea, but no vomiting, no diarrhea, and no constipation. 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 Serum ammonia was mildly elevated. Likely developed HE. Will continue lactulose at low dose with goal 2 bm's daily. Severe protein calorie malnutrition  Muscle wasting, bitemporal wasting. Due to her disease process  DHT advanced to duodenum 9/1 and resumed TFs - FT came out overnight 9/6/22 could not be replaced. Became more alert after lactulose so now on diet but PO intake remains poor. Refusing TF placement now. Discussed at length with her to at least finish her Ensures otherwise the FT has to be reinserted else she won't survive due to malnutrition. Improved PO intake the last few days    Acute kidney injury- improved and stable  Suspected ATN, IVVD due to poor PO intake, hypotension, NSAID use. Off albumin and IVFLuids-stable renal function stable    Hypotension- low stable  Continue midodrine, now off IV albumin and IV fluids      Anemia  Iron studies consistent with chronic disease  Transfuse PRN - unit ordered for 9/2  Vitamin K per hepatology for coagulopathy when needed  -recommend repeat labs within 3 days of hospital discharge     Persistent leukocytosis: unclear etiology, no evidence of infection clinically. Afebrile. Has been on empiric IV ceftriaxone for 6 days, stopped 9/4. Sent UA and blood cultures. UA with yeast and bacteria, minimal pyuria, unlikely source of leukocytosis. Removed paredes 9/6.       Hypothermia- Sandy kdgger used 9/12/22- temp okay without now     Code status: Per 9/14/2022 hospitalist : [facility] - continue full current care otherwise but do not escalate (if pt rapidly deteriorates make comfort care and inform sister over the phone 24/7). \"  Also, hepatologist Dr. Yves Primrose placed in his notes that  pt opted for DNR on prior day as well.  ------>>>however 9/15/2022 nurse reports pt wants full code confirmed by hospitalist which pt also clearly reiterates full code--recommend pt is followed by hepatologist near SNF upon hospital discharge       NOTIFY [address]:   Fever over 101 degrees for 24 hours. Chest pain, shortness of breath, fever, chills, nausea, vomiting, diarrhea, change in mentation, falling, weakness, bleeding. Severe pain or pain not relieved by medications, as well as any other signs or symptoms that you may have questions about. FOLLOW UP APPOINTMENTS:    Follow-up Information       Follow up With Specialties Details Why Contact Info    SNF facility  Go today accepting SNF provider to take over all care of patient upon hospital discharge                DIET: Regular Diet    ACTIVITY: Activity as tolerated      DISCHARGE MEDICATIONS:  Current Discharge Medication List        START taking these medications    Details   lactulose (CHRONULAC) 10 gram/15 mL solution Take 30 mL by mouth daily. Qty: 1 Bottle, Refills: 0  Start date: 9/15/2022      lidocaine 4 % patch Apply over area on intact skin where pt verbalizes pain  Qty: 1 Patch, Refills: 0  Start date: 9/15/2022      magnesium oxide (MAG-OX) 400 mg tablet Take 1 Tablet by mouth daily. Qty: 30 Tablet, Refills: 0  Start date: 9/15/2022      midodrine (PROAMATINE) 5 mg tablet Take 3 Tablets by mouth every eight (8) hours for 30 days. Qty: 30 Tablet, Refills: 0  Start date: 9/15/2022, End date: 10/15/2022      L.acid,para-B. bifidum-S.therm (RISAQUAD) 8 billion cell cap cap Take 1 Capsule by mouth daily.   Qty: 30 Capsule, Refills: 0  Start date: 9/15/2022      nicotine (Bearl Damaso) 21 mg/24 hr 1 Patch by TransDERmal route daily for 30 days. Qty: 30 Patch, Refills: 0  Start date: 9/15/2022, End date: 10/15/2022      nystatin (MYCOSTATIN) 100,000 unit/mL suspension Take 5 mL by mouth four (4) times daily. swish and spit  Qty: 270 mL, Refills: 0  Start date: 9/15/2022      ondansetron (ZOFRAN ODT) 4 mg disintegrating tablet Take 1 Tablet by mouth every eight (8) hours as needed for Nausea or Vomiting. Qty: 10 Tablet, Refills: 0  Start date: 9/15/2022      pantoprazole (PROTONIX) 40 mg tablet Take 1 Tablet by mouth Daily (before breakfast). Qty: 30 Tablet, Refills: 0  Start date: 9/16/2022      potassium chloride SR (KLOR-CON 10) 10 mEq tablet Take 1 Tablet by mouth two (2) times a day. Recommend repeat labs within 3 days of hospital discharge  Indications: low amount of potassium in the blood  Qty: 2 Tablet, Refills: 0  Start date: 9/15/2022      sodium bicarbonate 650 mg tablet Take 1 Tablet by mouth two (2) times a day.  Recommend repeat labs within 3 days of hospital discharge  Qty: 30 Tablet, Refills: 0  Start date: 9/15/2022           CONTINUE these medications which have NOT CHANGED    Details   buPROPion XL (WELLBUTRIN XL) 150 mg tablet bupropion HCl  mg 24 hr tablet, extended release   TK 1 T PO QD      ARIPiprazole (ABILIFY) 2 mg tablet aripiprazole 2 mg tablet           STOP taking these medications       zolpidem (AMBIEN) 10 mg tablet Comments:   Reason for Stopping:         promethazine (PHENERGAN) 25 mg tablet Comments:   Reason for Stopping:         predniSONE (DELTASONE) 50 mg tablet Comments:   Reason for Stopping:         oxyCODONE IR (ROXICODONE) 5 mg immediate release tablet Comments:   Reason for Stopping:         omeprazole (PRILOSEC) 20 mg capsule Comments:   Reason for Stopping:         methylPREDNISolone acetate (DEPO-MEDROL) 80 mg/mL injection Comments:   Reason for Stopping:         LORazepam (ATIVAN) 1 mg tablet Comments:   Reason for Stopping: loratadine 10 mg cap Comments:   Reason for Stopping:         HYDROcodone-acetaminophen (NORCO) 5-325 mg per tablet Comments:   Reason for Stopping:         gabapentin (NEURONTIN) 600 mg tablet Comments:   Reason for Stopping:         DULoxetine (CYMBALTA) 60 mg capsule Comments:   Reason for Stopping:         cyclobenzaprine (FLEXERIL) 10 mg tablet Comments:   Reason for Stopping:         carvediloL (COREG) 25 mg tablet Comments:   Reason for Stopping:         acyclovir (ZOVIRAX) 200 mg capsule Comments:   Reason for Stopping:               DISPOSITION:    Home With:   OT  PT  [facility]  RN      x Long term SNF/Inpatient Rehab    Independent/assisted living    Hospice    Other:       PATIENT CONDITION AT DISCHARGE:     Functional status   x Poor     Deconditioned     Independent      Cognition   x  Lucid     Forgetful     Dementia        Code status    x Full code as per 9/15/2022 which pt changed her mind and requseted full code with the nurse and the hospitalist    DNR      PHYSICAL EXAMINATION AT DISCHARGE:  General:          Alert, cooperative, no distress   HEENT:           Atraumatic, anicteric sclerae  Neck:               Supple, symmetrical  Lungs:             No Wheezing or Rhonchi. Chest wall:      No tenderness  No Accessory muscle use. Heart:              Regular  rhythm,  No  rubs  No edema  Abdomen:        Soft, non-tender. Bowel sounds heard  Extremities:     No cyanosis. General muscle atrophy  Skin:                Jaundiced, no obvious hematoma  Psych:             Not anxious or agitated.   Neurologic:      Alert, moves all extremities, answers questions appropriately and responds to commands       CHRONIC MEDICAL DIAGNOSES:  Problem List as of 9/15/2022 Date Reviewed: 8/30/2022            Codes Class Noted - Resolved    * (Principal) Alcoholic [facility] liver with [facility] ICD-10-CM: K70.31  ICD-9-CM: 571.2  8/30/2022 - Present        RESOLVED: Severe protein-calorie malnutrition ([facility] [address] (Chronic) ICD-10-CM: Z83  ICD-9-CM: 554  8/31/2022 - 9/15/2022           Greater than 40 minutes were spent with the patient on counseling and coordination of care    Signed:   Miguel Gabmoa MD  9/15/2022  8:49 AM

## 2022-09-23 LAB
A1AT SERPL-MCNC: 149 MG/DL (ref 101–187)
ANA SER QL: NEGATIVE
CERULOPLASMIN SERPL-MCNC: 13.7 MG/DL (ref 19–39)
HAV AB SER QL IA: POSITIVE
HBV CORE AB SERPL QL IA: NEGATIVE
MITOCHONDRIA M2 IGG SER-ACNC: <20 UNITS (ref 0–20)
SMA IGG SER-ACNC: 6 UNITS (ref 0–19)

## 2024-05-07 NOTE — PROGRESS NOTES
1550: Bedside and Verbal shift change report given to Cuca Landaverde RN (oncoming nurse) by RN (offgoing nurse). Report included the following information SBAR, Kardex, Intake/Output, MAR, Recent Results, and Cardiac Rhythm Sinus Rhythm . 1630: Trevino changed at this time for urine sample. 1930: Bedside and Verbal shift change report given to PASHA Lee (oncoming nurse) by Cuca Landaverde RN (offgoing nurse). Report included the following information SBAR, Kardex, Intake/Output, MAR, Recent Results, and Cardiac Rhythm Sinus Rhythm . Care Plans:   Problem: Falls - Risk of  Goal: *Absence of Falls  Description: Document Janet Fall Risk and appropriate interventions in the flowsheet.   Outcome: Progressing Towards Goal  Note: Fall Risk Interventions:  Mobility Interventions: Bed/chair exit alarm, Communicate number of staff needed for ambulation/transfer, Patient to call before getting OOB    Mentation Interventions: Adequate sleep, hydration, pain control, Bed/chair exit alarm, Door open when patient unattended, Increase mobility, More frequent rounding, Reorient patient, Room close to nurse's station, Self-releasing belt, Toileting rounds, Update white board    Medication Interventions: Assess postural VS orthostatic hypotension, Bed/chair exit alarm, Patient to call before getting OOB, Teach patient to arise slowly    Elimination Interventions: Bed/chair exit alarm, Call light in reach, Stay With Me (per policy), Patient to call for help with toileting needs, Toilet paper/wipes in reach, Toileting schedule/hourly rounds    History of Falls Interventions: Bed/chair exit alarm, Door open when patient unattended, Investigate reason for fall         Problem: Patient Education: Go to Patient Education Activity  Goal: Patient/Family Education  Outcome: Progressing Towards Goal     Problem: Pressure Injury - Risk of  Goal: *Prevention of pressure injury  Description: Document Chandana Scale and appropriate interventions in the flowsheet. Outcome: Progressing Towards Goal  Note: Pressure Injury Interventions:  Sensory Interventions: Assess changes in LOC, Assess need for specialty bed, Avoid rigorous massage over bony prominences, Maintain/enhance activity level, Minimize linen layers, Monitor skin under medical devices, Pad between skin to skin, Pressure redistribution bed/mattress (bed type)    Moisture Interventions: Absorbent underpads, Apply protective barrier, creams and emollients, Assess need for specialty bed, Limit adult briefs, Maintain skin hydration (lotion/cream), Minimize layers, Moisture barrier, Internal/External urinary devices    Activity Interventions: Assess need for specialty bed, Increase time out of bed, Pressure redistribution bed/mattress(bed type)    Mobility Interventions: Assess need for specialty bed, Pressure redistribution bed/mattress (bed type), Turn and reposition approx.  every two hours(pillow and wedges)    Nutrition Interventions: Document food/fluid/supplement intake    Friction and Shear Interventions: Lift sheet, Minimize layers                Problem: Patient Education: Go to Patient Education Activity  Goal: Patient/Family Education  Outcome: Progressing Towards Goal     Problem: Hypotension  Goal: *Blood pressure within specified parameters  Outcome: Progressing Towards Goal  Goal: *Fluid volume balance  Outcome: Progressing Towards Goal  Goal: *Labs within defined limits  Outcome: Progressing Towards Goal     Problem: Patient Education: Go to Patient Education Activity  Goal: Patient/Family Education  Outcome: Progressing Towards Goal     Problem: General Medical Care Plan  Goal: *Vital signs within specified parameters  Outcome: Progressing Towards Goal  Goal: *Labs within defined limits  Outcome: Progressing Towards Goal  Goal: *Absence of infection signs and symptoms  Outcome: Progressing Towards Goal  Goal: *Optimal pain control at patient's stated goal  Outcome: Progressing Towards Goal  Goal: *Skin integrity maintained  Outcome: Progressing Towards Goal  Goal: *Fluid volume balance  Outcome: Progressing Towards Goal  Goal: *Optimize nutritional status  Outcome: Progressing Towards Goal  Goal: *Anxiety reduced or absent  Outcome: Progressing Towards Goal  Goal: *Progressive mobility and function (eg: ADL's)  Outcome: Progressing Towards Goal     Problem: Patient Education: Go to Patient Education Activity  Goal: Patient/Family Education  Outcome: Progressing Towards Goal     Problem:  Body Temperature -  Risk of, Imbalanced  Goal: *Absence of cold stress or hypothermia signs and symptoms  Outcome: Progressing Towards Goal     Problem: Patient Education: Go to Patient Education Activity  Goal: Patient/Family Education  Outcome: Progressing Towards Goal     Problem: Patient Education: Go to Patient Education Activity  Goal: Patient/Family Education  Outcome: Progressing Towards Goal     Problem: Patient Education: Go to Patient Education Activity  Goal: Patient/Family Education  Outcome: Progressing Towards Goal Cigarettes

## (undated) DEVICE — TUBING HYDR IRR --